# Patient Record
Sex: MALE | Race: WHITE | NOT HISPANIC OR LATINO | Employment: FULL TIME | URBAN - METROPOLITAN AREA
[De-identification: names, ages, dates, MRNs, and addresses within clinical notes are randomized per-mention and may not be internally consistent; named-entity substitution may affect disease eponyms.]

---

## 2017-09-18 ENCOUNTER — OFFICE VISIT (OUTPATIENT)
Dept: URGENT CARE | Facility: URGENT CARE | Age: 52
End: 2017-09-18
Payer: COMMERCIAL

## 2017-09-18 VITALS
HEART RATE: 70 BPM | RESPIRATION RATE: 14 BRPM | BODY MASS INDEX: 25.73 KG/M2 | WEIGHT: 190 LBS | HEIGHT: 72 IN | TEMPERATURE: 98.5 F

## 2017-09-18 DIAGNOSIS — S61.215A LACERATION OF LEFT RING FINGER WITHOUT DAMAGE TO NAIL, FOREIGN BODY PRESENCE UNSPECIFIED, INITIAL ENCOUNTER: Primary | ICD-10-CM

## 2017-09-18 PROCEDURE — 12001 RPR S/N/AX/GEN/TRNK 2.5CM/<: CPT | Performed by: FAMILY MEDICINE

## 2017-09-18 NOTE — MR AVS SNAPSHOT
After Visit Summary   9/18/2017    Manjinder Esparza    MRN: [id number]           Patient Information     Date Of Birth          1965        Visit Information        Provider Department      9/18/2017 6:50 PM Rashawn Diez MD [facility]        Today's Diagnoses     Laceration of left ring finger without damage to nail, foreign body presence unspecified, initial encounter    -  1       Follow-ups after your visit        Who to contact     If you have questions or need follow up information about today's clinic visit or your schedule please contact [facility] directly at [phone].  Normal or non-critical lab and imaging results will be communicated to you by MyChart, letter or phone within 4 business days after the clinic has received the results. If you do not hear from us within 7 days, please contact the clinic through [employer] or phone. If you have a critical or abnormal lab result, we will notify you by phone as soon as possible.  Submit refill requests through [employer] or call your pharmacy and they will forward the refill request to us. Please allow 3 business days for your refill to be completed.          Additional Information About Your Visit        MyChart Information     [employer] gives you secure access to your electronic health record. If you see a primary care provider, you can also send messages to your care team and make appointments. If you have questions, please call your primary care clinic.  If you do not have a primary care provider, please call [phone] and they will assist you.        Care EveryWhere ID     This is your Care EveryWhere ID. This could be used by other organizations to access your [place] medical records  [id number]        Your Vitals Were     Pulse Temperature Respirations Height BMI (Body Mass Index)       70 98.5  F (36.9  C) (Oral) 14 6' (1.829 m) 25.77 kg/m2        Blood Pressure from Last 3  Encounters:   11/17/16 120/80   08/05/16 (!) 146/100   07/16/16 (!) 142/91    Weight from Last 3 Encounters:   09/18/17 190 lb (86.2 kg)   11/17/16 192 lb (87.1 kg)   08/05/16 182 lb (82.6 kg)              We Performed the Following     REPAIR SUPERFICIAL, WOUND BODY < =2.5CM        Primary Care Provider Office Phone # Fax #    Honey SERRANO CHERYL Gaspar [phone]       [address] [facility] [address]        Equal Access to Services     [facility] : Hadii aad ku hadasho Soomaali, waaxda luqadaha, qaybta kaalmada adeegyada, tea beckford . So [facility] [phone].    ATENCIÓN: Si habla español, tiene a hastings disposición servicios gratuitos de asistencia lingüística. [facility] [phone].    We comply with applicable federal civil rights laws and [place] laws. We do not discriminate on the basis of race, color, national origin, age, disability sex, sexual orientation or gender identity.            Thank you!     Thank you for choosing [facility]  for your care. Our goal is always to provide you with excellent care. Hearing back from our patients is one way we can continue to improve our services. Please take a few minutes to complete the written survey that you may receive in the mail after your visit with us. Thank you!             Your Updated Medication List - Protect others around you: Learn how to safely use, store and throw away your medicines at www.disposemymeds.org.          This list is accurate as of: 9/18/17  7:28 PM.  Always use your most recent med list.                   Brand Name Dispense Instructions for use Diagnosis    hydrochlorothiazide 25 MG tablet    HYDRODIURIL    90 tablet    Take 1 tablet (25 mg) by mouth daily    Benign essential hypertension       neomycin-polymyxin-hydrocortisone 3.5-08669-6 otic suspension    CORTISPORIN    10 mL    Place 4 drops in ear(s) 4 times daily    Acute otitis externa of left ear, unspecified  type

## 2017-09-19 NOTE — NURSING NOTE
Chief Complaint   Patient presents with     Urgent Care     Laceration     cut to left 4th finger about 6:30 with razor blade. last tdap 2011       Initial Pulse 70  Temp 98.5  F (36.9  C) (Oral)  Resp 14  Ht 6' (1.829 m)  Wt 190 lb (86.2 kg)  BMI 25.77 kg/m2 Estimated body mass index is 25.77 kg/(m^2) as calculated from the following:    Height as of this encounter: 6' (1.829 m).    Weight as of this encounter: 190 lb (86.2 kg).  Medication Reconciliation: complete

## 2017-09-19 NOTE — PROGRESS NOTES
Subjective: Patient was working with a utility knife in his basement doing some repairs and cut the pulp of the palmar side of his distal digit fourth finger on the left. Tetanus is up-to-date.    Objective: There is about a 2 cm long incision in the middle of his fifth finger palmar side distal digit. After local Xylocaine without epinephrine I repaired it with 4-0 Ethilon.5 sutures.    Assessment and plan: Laceration repair left fourth finger, sutures out in 7-10 days.

## 2018-06-28 ENCOUNTER — HOSPITAL ENCOUNTER (OUTPATIENT)
Facility: CLINIC | Age: 53
Setting detail: OBSERVATION
Discharge: HOME | End: 2018-06-29
Attending: EMERGENCY MEDICINE | Admitting: INTERNAL MEDICINE
Payer: COMMERCIAL

## 2018-06-28 ENCOUNTER — OFFICE VISIT (OUTPATIENT)
Dept: FAMILY MEDICINE | Facility: CLINIC | Age: 53
End: 2018-06-28
Payer: COMMERCIAL

## 2018-06-28 ENCOUNTER — APPOINTMENT (OUTPATIENT)
Dept: CARDIOLOGY | Facility: CLINIC | Age: 53
End: 2018-06-28
Attending: EMERGENCY MEDICINE
Payer: COMMERCIAL

## 2018-06-28 ENCOUNTER — APPOINTMENT (OUTPATIENT)
Dept: GENERAL RADIOLOGY | Facility: CLINIC | Age: 53
End: 2018-06-28
Attending: EMERGENCY MEDICINE
Payer: COMMERCIAL

## 2018-06-28 VITALS
RESPIRATION RATE: 18 BRPM | DIASTOLIC BLOOD PRESSURE: 89 MMHG | SYSTOLIC BLOOD PRESSURE: 133 MMHG | WEIGHT: 185 LBS | BODY MASS INDEX: 25.9 KG/M2 | TEMPERATURE: 97.6 F | HEART RATE: 99 BPM | OXYGEN SATURATION: 99 % | HEIGHT: 71 IN

## 2018-06-28 DIAGNOSIS — R06.02 SOB (SHORTNESS OF BREATH): ICD-10-CM

## 2018-06-28 DIAGNOSIS — I48.91 ATRIAL FIBRILLATION, UNSPECIFIED TYPE (H): ICD-10-CM

## 2018-06-28 DIAGNOSIS — I48.91 ATRIAL FIBRILLATION, NEW ONSET (H): Primary | ICD-10-CM

## 2018-06-28 DIAGNOSIS — I48.91 NEW ONSET A-FIB (H): Primary | ICD-10-CM

## 2018-06-28 DIAGNOSIS — R07.89 CHEST PRESSURE: ICD-10-CM

## 2018-06-28 LAB
ALBUMIN SERPL-MCNC: 3.8 G/DL (ref 3.4–5)
ALP SERPL-CCNC: 49 U/L (ref 40–150)
ALT SERPL W P-5'-P-CCNC: 22 U/L (ref 0–70)
ANION GAP SERPL CALCULATED.3IONS-SCNC: 9 MMOL/L (ref 3–14)
AST SERPL W P-5'-P-CCNC: 16 U/L (ref 0–45)
BASOPHILS # BLD AUTO: 0 10E9/L (ref 0–0.2)
BASOPHILS NFR BLD AUTO: 0.3 %
BILIRUB SERPL-MCNC: 0.7 MG/DL (ref 0.2–1.3)
BUN SERPL-MCNC: 17 MG/DL (ref 7–30)
CA-I BLD-MCNC: 4.6 MG/DL (ref 4.4–5.2)
CALCIUM SERPL-MCNC: 8.4 MG/DL (ref 8.5–10.1)
CHLORIDE BLD-SCNC: 107 MMOL/L (ref 94–109)
CHLORIDE SERPL-SCNC: 109 MMOL/L (ref 94–109)
CO2 SERPL-SCNC: 25 MMOL/L (ref 20–32)
CREAT SERPL-MCNC: 1.22 MG/DL (ref 0.66–1.25)
DIFFERENTIAL METHOD BLD: NORMAL
EOSINOPHIL # BLD AUTO: 0.2 10E9/L (ref 0–0.7)
EOSINOPHIL NFR BLD AUTO: 3 %
ERYTHROCYTE [DISTWIDTH] IN BLOOD BY AUTOMATED COUNT: 12.6 % (ref 10–15)
ERYTHROCYTE [DISTWIDTH] IN BLOOD BY AUTOMATED COUNT: 12.7 % (ref 10–15)
GFR SERPL CREATININE-BSD FRML MDRD: 62 ML/MIN/1.7M2
GLUCOSE BLD-MCNC: 132 MG/DL (ref 70–99)
GLUCOSE SERPL-MCNC: 120 MG/DL (ref 70–99)
HCT VFR BLD AUTO: 45.4 % (ref 40–53)
HCT VFR BLD AUTO: 46.2 % (ref 40–53)
HGB BLD-MCNC: 15.9 G/DL (ref 13.3–17.7)
HGB BLD-MCNC: 15.9 G/DL (ref 13.3–17.7)
IMM GRANULOCYTES # BLD: 0 10E9/L (ref 0–0.4)
IMM GRANULOCYTES NFR BLD: 0.3 %
INTERPRETATION ECG - MUSE: NORMAL
LACTATE BLD-SCNC: 1.4 MMOL/L (ref 0.4–1.9)
LACTATE BLD-SCNC: 1.4 MMOL/L (ref 0.7–2)
LMWH PPP CHRO-ACNC: <0.1 IU/ML
LYMPHOCYTES # BLD AUTO: 2.5 10E9/L (ref 0.8–5.3)
LYMPHOCYTES NFR BLD AUTO: 35.4 %
MAGNESIUM SERPL-MCNC: 2.2 MG/DL (ref 1.6–2.3)
MCH RBC QN AUTO: 31.2 PG (ref 26.5–33)
MCH RBC QN AUTO: 31.3 PG (ref 26.5–33)
MCHC RBC AUTO-ENTMCNC: 34.4 G/DL (ref 31.5–36.5)
MCHC RBC AUTO-ENTMCNC: 35 G/DL (ref 31.5–36.5)
MCV RBC AUTO: 89 FL (ref 78–100)
MCV RBC AUTO: 91 FL (ref 78–100)
MONOCYTES # BLD AUTO: 1 10E9/L (ref 0–1.3)
MONOCYTES NFR BLD AUTO: 14.4 %
NEUTROPHILS # BLD AUTO: 3.3 10E9/L (ref 1.6–8.3)
NEUTROPHILS NFR BLD AUTO: 46.6 %
NRBC # BLD AUTO: 0 10*3/UL
NRBC BLD AUTO-RTO: 0 /100
NT-PROBNP SERPL-MCNC: 1313 PG/ML (ref 0–900)
PLATELET # BLD AUTO: 153 10E9/L (ref 150–450)
PLATELET # BLD AUTO: 159 10E9/L (ref 150–450)
POTASSIUM BLD-SCNC: 4.2 MMOL/L (ref 3.4–5.3)
POTASSIUM SERPL-SCNC: 4.1 MMOL/L (ref 3.4–5.3)
PROT SERPL-MCNC: 7.1 G/DL (ref 6.8–8.8)
RBC # BLD AUTO: 5.08 10E12/L (ref 4.4–5.9)
RBC # BLD AUTO: 5.09 10E12/L (ref 4.4–5.9)
SODIUM BLD-SCNC: 141 MMOL/L (ref 133–144)
SODIUM SERPL-SCNC: 143 MMOL/L (ref 133–144)
TROPONIN I SERPL-MCNC: <0.015 UG/L (ref 0–0.04)
TSH SERPL DL<=0.005 MIU/L-ACNC: 1.94 MU/L (ref 0.4–4)
WBC # BLD AUTO: 7.1 10E9/L (ref 4–11)
WBC # BLD AUTO: 8.3 10E9/L (ref 4–11)

## 2018-06-28 PROCEDURE — 99285 EMERGENCY DEPT VISIT HI MDM: CPT | Mod: 25 | Performed by: EMERGENCY MEDICINE

## 2018-06-28 PROCEDURE — 96365 THER/PROPH/DIAG IV INF INIT: CPT | Performed by: EMERGENCY MEDICINE

## 2018-06-28 PROCEDURE — 93010 ELECTROCARDIOGRAM REPORT: CPT | Mod: Z6 | Performed by: EMERGENCY MEDICINE

## 2018-06-28 PROCEDURE — 82435 ASSAY OF BLOOD CHLORIDE: CPT

## 2018-06-28 PROCEDURE — 25000132 ZZH RX MED GY IP 250 OP 250 PS 637: Performed by: NURSE PRACTITIONER

## 2018-06-28 PROCEDURE — 96366 THER/PROPH/DIAG IV INF ADDON: CPT | Performed by: EMERGENCY MEDICINE

## 2018-06-28 PROCEDURE — 85025 COMPLETE CBC W/AUTO DIFF WBC: CPT | Performed by: EMERGENCY MEDICINE

## 2018-06-28 PROCEDURE — 83880 ASSAY OF NATRIURETIC PEPTIDE: CPT | Performed by: EMERGENCY MEDICINE

## 2018-06-28 PROCEDURE — 99221 1ST HOSP IP/OBS SF/LOW 40: CPT | Mod: 25 | Performed by: INTERNAL MEDICINE

## 2018-06-28 PROCEDURE — 96376 TX/PRO/DX INJ SAME DRUG ADON: CPT | Performed by: EMERGENCY MEDICINE

## 2018-06-28 PROCEDURE — 25000125 ZZHC RX 250: Performed by: EMERGENCY MEDICINE

## 2018-06-28 PROCEDURE — 93306 TTE W/DOPPLER COMPLETE: CPT

## 2018-06-28 PROCEDURE — 83605 ASSAY OF LACTIC ACID: CPT | Performed by: INTERNAL MEDICINE

## 2018-06-28 PROCEDURE — 21400006 ZZH R&B CCU INTERMEDIATE UMMC

## 2018-06-28 PROCEDURE — 84132 ASSAY OF SERUM POTASSIUM: CPT

## 2018-06-28 PROCEDURE — 36415 COLL VENOUS BLD VENIPUNCTURE: CPT | Performed by: INTERNAL MEDICINE

## 2018-06-28 PROCEDURE — 93306 TTE W/DOPPLER COMPLETE: CPT | Mod: 26 | Performed by: INTERNAL MEDICINE

## 2018-06-28 PROCEDURE — 99291 CRITICAL CARE FIRST HOUR: CPT | Mod: 25 | Performed by: EMERGENCY MEDICINE

## 2018-06-28 PROCEDURE — 71046 X-RAY EXAM CHEST 2 VIEWS: CPT

## 2018-06-28 PROCEDURE — 25000131 ZZH RX MED GY IP 250 OP 636 PS 637: Performed by: STUDENT IN AN ORGANIZED HEALTH CARE EDUCATION/TRAINING PROGRAM

## 2018-06-28 PROCEDURE — 80053 COMPREHEN METABOLIC PANEL: CPT | Performed by: EMERGENCY MEDICINE

## 2018-06-28 PROCEDURE — 82330 ASSAY OF CALCIUM: CPT

## 2018-06-28 PROCEDURE — 84295 ASSAY OF SERUM SODIUM: CPT

## 2018-06-28 PROCEDURE — 82947 ASSAY GLUCOSE BLOOD QUANT: CPT

## 2018-06-28 PROCEDURE — 84484 ASSAY OF TROPONIN QUANT: CPT | Performed by: EMERGENCY MEDICINE

## 2018-06-28 PROCEDURE — 81001 URINALYSIS AUTO W/SCOPE: CPT | Performed by: EMERGENCY MEDICINE

## 2018-06-28 PROCEDURE — 83605 ASSAY OF LACTIC ACID: CPT

## 2018-06-28 PROCEDURE — 96368 THER/DIAG CONCURRENT INF: CPT | Performed by: EMERGENCY MEDICINE

## 2018-06-28 PROCEDURE — 84443 ASSAY THYROID STIM HORMONE: CPT | Performed by: EMERGENCY MEDICINE

## 2018-06-28 PROCEDURE — 93000 ELECTROCARDIOGRAM COMPLETE: CPT | Performed by: NURSE PRACTITIONER

## 2018-06-28 PROCEDURE — 83735 ASSAY OF MAGNESIUM: CPT | Performed by: EMERGENCY MEDICINE

## 2018-06-28 PROCEDURE — 85027 COMPLETE CBC AUTOMATED: CPT | Performed by: EMERGENCY MEDICINE

## 2018-06-28 PROCEDURE — 99214 OFFICE O/P EST MOD 30 MIN: CPT | Performed by: NURSE PRACTITIONER

## 2018-06-28 PROCEDURE — 93005 ELECTROCARDIOGRAM TRACING: CPT | Performed by: EMERGENCY MEDICINE

## 2018-06-28 PROCEDURE — 85520 HEPARIN ASSAY: CPT | Performed by: INTERNAL MEDICINE

## 2018-06-28 PROCEDURE — 25000128 H RX IP 250 OP 636: Performed by: EMERGENCY MEDICINE

## 2018-06-28 RX ORDER — DILTIAZEM HYDROCHLORIDE 5 MG/ML
25 INJECTION INTRAVENOUS ONCE
Status: COMPLETED | OUTPATIENT
Start: 2018-06-28 | End: 2018-06-28

## 2018-06-28 RX ORDER — NITROGLYCERIN 0.4 MG/1
0.4 TABLET SUBLINGUAL EVERY 5 MIN PRN
Status: DISCONTINUED | OUTPATIENT
Start: 2018-06-28 | End: 2018-06-29 | Stop reason: HOSPADM

## 2018-06-28 RX ORDER — ALUMINA, MAGNESIA, AND SIMETHICONE 2400; 2400; 240 MG/30ML; MG/30ML; MG/30ML
30 SUSPENSION ORAL EVERY 4 HOURS PRN
Status: DISCONTINUED | OUTPATIENT
Start: 2018-06-28 | End: 2018-06-29 | Stop reason: HOSPADM

## 2018-06-28 RX ORDER — METOPROLOL TARTRATE 1 MG/ML
2.5-5 INJECTION, SOLUTION INTRAVENOUS EVERY 5 MIN PRN
Status: DISCONTINUED | OUTPATIENT
Start: 2018-06-28 | End: 2018-06-29 | Stop reason: HOSPADM

## 2018-06-28 RX ORDER — LIDOCAINE 40 MG/G
CREAM TOPICAL
Status: DISCONTINUED | OUTPATIENT
Start: 2018-06-28 | End: 2018-06-29 | Stop reason: HOSPADM

## 2018-06-28 RX ORDER — ACETAMINOPHEN 325 MG/1
650 TABLET ORAL EVERY 4 HOURS PRN
Status: DISCONTINUED | OUTPATIENT
Start: 2018-06-28 | End: 2018-06-29 | Stop reason: HOSPADM

## 2018-06-28 RX ORDER — METOPROLOL TARTRATE 50 MG
50 TABLET ORAL 2 TIMES DAILY
Status: DISCONTINUED | OUTPATIENT
Start: 2018-06-28 | End: 2018-06-29 | Stop reason: HOSPADM

## 2018-06-28 RX ORDER — ONDANSETRON 4 MG/1
4 TABLET, ORALLY DISINTEGRATING ORAL EVERY 6 HOURS PRN
Status: DISCONTINUED | OUTPATIENT
Start: 2018-06-28 | End: 2018-06-29 | Stop reason: HOSPADM

## 2018-06-28 RX ORDER — HEPARIN SODIUM 10000 [USP'U]/100ML
0-3500 INJECTION, SOLUTION INTRAVENOUS CONTINUOUS
Status: DISCONTINUED | OUTPATIENT
Start: 2018-06-28 | End: 2018-06-29

## 2018-06-28 RX ORDER — HYDROCHLOROTHIAZIDE 25 MG/1
25 TABLET ORAL DAILY
Status: CANCELLED | OUTPATIENT
Start: 2018-06-28

## 2018-06-28 RX ORDER — LABETALOL HYDROCHLORIDE 5 MG/ML
10 INJECTION, SOLUTION INTRAVENOUS ONCE
Status: COMPLETED | OUTPATIENT
Start: 2018-06-28 | End: 2018-06-28

## 2018-06-28 RX ORDER — ACETAMINOPHEN 650 MG/1
650 SUPPOSITORY RECTAL EVERY 4 HOURS PRN
Status: DISCONTINUED | OUTPATIENT
Start: 2018-06-28 | End: 2018-06-29 | Stop reason: HOSPADM

## 2018-06-28 RX ORDER — ONDANSETRON 2 MG/ML
4 INJECTION INTRAMUSCULAR; INTRAVENOUS EVERY 6 HOURS PRN
Status: DISCONTINUED | OUTPATIENT
Start: 2018-06-28 | End: 2018-06-29 | Stop reason: HOSPADM

## 2018-06-28 RX ADMIN — METOPROLOL TARTRATE 50 MG: 50 TABLET ORAL at 20:23

## 2018-06-28 RX ADMIN — DILTIAZEM HYDROCHLORIDE 25 MG: 5 INJECTION INTRAVENOUS at 10:25

## 2018-06-28 RX ADMIN — HEPARIN SODIUM 1000 UNITS/HR: 10000 INJECTION, SOLUTION INTRAVENOUS at 12:59

## 2018-06-28 RX ADMIN — DILTIAZEM HYDROCHLORIDE 5 MG/HR: 5 INJECTION INTRAVENOUS at 12:56

## 2018-06-28 RX ADMIN — Medication 10 MG: at 19:00

## 2018-06-28 ASSESSMENT — ACTIVITIES OF DAILY LIVING (ADL)
TOILETING: 0-->INDEPENDENT
AMBULATION: 0-->INDEPENDENT
BATHING: 0-->INDEPENDENT
COGNITION: 0 - NO COGNITION ISSUES REPORTED
RETIRED_COMMUNICATION: 0-->UNDERSTANDS/COMMUNICATES WITHOUT DIFFICULTY
FALL_HISTORY_WITHIN_LAST_SIX_MONTHS: YES
NUMBER_OF_TIMES_PATIENT_HAS_FALLEN_WITHIN_LAST_SIX_MONTHS: 1
TRANSFERRING: 0-->INDEPENDENT
SWALLOWING: 0-->SWALLOWS FOODS/LIQUIDS WITHOUT DIFFICULTY
RETIRED_EATING: 0-->INDEPENDENT
DRESS: 0-->INDEPENDENT

## 2018-06-28 ASSESSMENT — ENCOUNTER SYMPTOMS
DIZZINESS: 1
PALPITATIONS: 1
UNEXPECTED WEIGHT CHANGE: 0

## 2018-06-28 NOTE — ED NOTES
[facility], [place]   ED Nurse to Floor Handoff     Manjinder Esparza is a 53 year old male who speaks English and lives with a spouse,  in a home  They arrived in the ED by car from home    ED Chief Complaint: Palpitations and Abnormal Ekg    ED Dx;   Final diagnoses:   Atrial fibrillation, unspecified type (H)         Needed?: No    Allergies:   Allergies   Allergen Reactions     Lisinopril      cough   .  Past Medical Hx: History reviewed. No pertinent past medical history.   Baseline Mental status: WDL  Current Mental Status changes: at basesline    Infection present or suspected this encounter: no  Sepsis suspected: No  Isolation type: No active isolations     Activity level - Baseline/Home:  Independent  Activity Level - Current:   Independent    Bariatric equipment needed?: No    In the ED these meds were given:   Medications   heparin  drip 25,000 units in 0.45% NaCl 250 mL (see additional administration details for dose) (1,000 Units/hr Intravenous New Bag 6/28/18 1259)   heparin bolus from infusion pump (not administered)   diltiazem (CARDIZEM) injection 25 mg (25 mg Intravenous Given 6/28/18 1025)   diltiazem (CARDIZEM) 125 mg in sodium chloride 0.9 % 125 mL infusion (5 mg/hr Intravenous New Bag 6/28/18 1256)       Drips running?  Yes    Home pump  No    Current LDAs  Peripheral IV 06/28/18 Left Lower forearm (Active)   Site Assessment WDL 6/28/2018 12:49 PM   Line Status Saline locked 6/28/2018 12:49 PM   Number of days:0       Labs results:   Labs Ordered and Resulted from Time of ED Arrival Up to the Time of Departure from the ED   COMPREHENSIVE METABOLIC PANEL - Abnormal; Notable for the following:        Result Value    Glucose 120 (*)     Calcium 8.4 (*)     All other components within normal limits   NT PROBNP INPATIENT - Abnormal; Notable for the following:     N-Terminal Pro BNP Inpatient 1313 (*)     All other components within normal limits   CBC WITH  PLATELETS DIFFERENTIAL   MAGNESIUM   TSH   TROPONIN I   CBC WITH PLATELETS   ROUTINE UA WITH MICROSCOPIC REFLEX TO CULTURE   ORTHOSTATIC BLOOD PRESSURE AND PULSE   NOTIFY PHYSICIAN   NOTIFY PHYSICIAN   PLATELETS MONITORED PER HEPARIN TREATMENT PROTOCOL (FOR MEANINGFUL USE       Imaging Studies:   Recent Results (from the past 24 hour(s))   XR Chest 2 Views    Narrative    Exam: XR CHEST 2 VW, 6/28/2018 10:20 AM    Indication: Shortness of breath;     Comparison: None available    Findings:   PA and lateral views of the chest. Cardiac silhouette is within normal  limits. Pulmonary vasculature is distinct. No focal airspace opacity,  pleural effusion, or pneumothorax. The upper abdomen is unremarkable.      Impression    Impression: No acute airspace disease.    I have personally reviewed the examination and initial interpretation  and I agree with the findings.    ROSLYN RASHEED MD       Recent vital signs:   /84  Pulse 113  Temp 97.7  F (36.5  C) (Axillary)  Resp 16  SpO2 96%    Cardiac Rhythm: A fib  Pt needs tele? Yes  Skin/wound Issues: None    Code Status: Full Code    Pain control: pt had none    Nausea control: pt had none    Abnormal labs/tests/findings requiring intervention: See EMR    Family present during ED course? Yes   Family Comments/Social Situation comments:     Tasks needing completion: None    James eBll, RN  asc-- [phone] [place] ED  [phone] [facility] ED

## 2018-06-28 NOTE — PROGRESS NOTES
"  SUBJECTIVE:   Manjindre Esparza is a 53 year old male who presents to clinic today for the following health issues:    Shortness of Breath       Duration: x1 day     Description (location/character/radiation): pt was working out yesterday and feels like he was going to faint, pt felt dizzy, arm was numb, shortness of breath, pt report he is not feeling right     Intensity:  mild    Accompanying signs and symptoms: None     History (similar episodes/previous evaluation): pt had a cold last week, pt     Therapies tried and outcome: None     New onset SOB with exercise yesterday  Chest pressure and tingling to left arm at the time.   \"feels off\"  Tachycardic - normally runs in the 60-70's.   HIGH stress at work right now.    Known HTN, but has not been taking his hydrochlorothiazide.    Get EKG.    No chest pain or pressure at this time.      Problem list and histories reviewed & adjusted, as indicated.  Additional history: as documented      Reviewed and updated as needed this visit by clinical staff  Tobacco  Allergies  Meds  Problems  Med Hx  Surg Hx  Fam Hx  Soc Hx        Reviewed and updated as needed this visit by Provider  Tobacco  Allergies  Meds  Problems  Med Hx  Surg Hx  Fam Hx  Soc Hx          ROS:  Constitutional, HEENT, cardiovascular, pulmonary, GI, , musculoskeletal, neuro, skin, endocrine and psych systems are negative, except as otherwise noted.    OBJECTIVE:     /89 (BP Location: Left arm, Patient Position: Sitting, Cuff Size: Adult Regular)  Pulse 99  Temp 97.6  F (36.4  C) (Oral)  Resp 18  Ht 5' 10.5\" (1.791 m)  Wt 185 lb (83.9 kg)  SpO2 99%  BMI 26.17 kg/m2  Body mass index is 26.17 kg/(m^2).  GENERAL: healthy, alert and no distress  EYES: Eyes grossly normal to inspection, PERRL and conjunctivae and sclerae normal  HENT: ear canals and TM's normal, nose and mouth without ulcers or lesions  NECK: no adenopathy, no asymmetry, masses, or scars and thyroid normal to " palpation  RESP: lungs clear to auscultation - no rales, rhonchi or wheezes  CV: irregularly irregular rhythm, peripheral pulses strong, no peripheral edema and color normal, nail beds pink and palms/soles pink  MS: no gross musculoskeletal defects noted, no edema  SKIN: no suspicious lesions or rashes    Diagnostic Test Results:  EKG - a fib noted.      ASSESSMENT/PLAN:       ICD-10-CM    1. Atrial fibrillation, new onset (H) I48.91    2. SOB (shortness of breath) R06.02 EKG 12-lead complete w/read - Clinics   3. Chest pressure R07.89 EKG 12-lead complete w/read - Clinics     Called and discussed with cardiology (Pierre) on call.  Recommend referral to ER if symptomatic.  To start beta blocker and anticoagulation if asymptomatic.    Discussed options with Pt - he prefers to go to ER for full workup.        JOHNNY Mercado [facility]

## 2018-06-28 NOTE — ED TRIAGE NOTES
"Pt comes from clinic with palpitations/feeling \"off\" since near syncopal episode yesterday after working out. Per pt, ekg was abnormal in clinic, and thus he was referred to ED for further work up. Alert and oriented.   "

## 2018-06-28 NOTE — ED PROVIDER NOTES
"  History     Chief Complaint   Patient presents with     Palpitations     Abnormal Ekg     HPI  Manjinder Esparza is a 53 year old male with a history of hypertension who presents for evaluation of palpitations. Patient reports yesterday morning while he was going through his regular exercise regimen where he does a lot of jumping and push-ups etc., he had the onset of dizziness, shortness of breath, and vision changes described as \"everything started going dark\". He felt as though he might faint, but denies syncope/loss of consciousness. Since yesterday morning he complains of persistent shortness of breath at rest and worse with exertion, as well as palpitations described as \"fast heart beat\", and intermittent hot flashes. Additionally, he reports he had an episode of left arm numbness last night, but this has since resolved. He denies chest pain, leg swelling, or recent weight gain. Further, patient reports over the past six months to one year he has had intermittent episodes of palpitations as well as labile blood pressures. He reports a distant history of hypertension and was on lisinopril several years ago, but after increasing his exercise regimen and losing weight his primary care provider did instruct the patient he could discontinue taking this medication.     I have reviewed the Medications, Allergies, Past Medical and Surgical History, and Social History in the [employer] system.  History reviewed. No pertinent past medical history.    Past Surgical History:   Procedure Laterality Date     SURGICAL HISTORY OF -       animal bone graft in the left lower jaw       Family History   Problem Relation Age of Onset     Diabetes Mother      type 2     Hypertension Mother      Cancer - colorectal Mother      Circulatory Mother      varicose veins     Eye Disorder Mother      cataracts     Hypertension Father      Prostate Cancer Father      Arthritis Father      in his knees     Lipids Father      Eye Disorder Maternal " Grandmother      cataracts     Cancer Maternal Grandmother      Cancer Maternal Grandfather      lung     Cerebrovascular Disease Paternal Grandmother      Allergies Other      self, developing allergies      GASTROINTESTINAL DISEASE Other      self, may have ibs     Lipids Brother      Prostate Cancer Brother        Social History   Substance Use Topics     Smoking status: Never Smoker     Smokeless tobacco: Never Used     Alcohol use Yes      Comment: 3 times a week       Current Facility-Administered Medications   Medication     heparin  drip 25,000 units in 0.45% NaCl 250 mL (see additional administration details for dose)     heparin bolus from infusion pump     Current Outpatient Prescriptions   Medication     hydrochlorothiazide (HYDRODIURIL) 25 MG tablet     neomycin-polymyxin-hydrocortisone (CORTISPORIN) 3.5-57211-5 otic suspension        Allergies   Allergen Reactions     Lisinopril      cough       Review of Systems   Constitutional: Negative for unexpected weight change.   Cardiovascular: Positive for palpitations. Negative for chest pain and leg swelling.   Neurological: Positive for dizziness. Negative for syncope.   All other systems reviewed and are negative.      Physical Exam   BP: (!) 136/108  Pulse: 113  Heart Rate: 120  Temp: 97.7  F (36.5  C)  Resp: 16  SpO2: 100 %  Lying Orthostatic BP: 145/111  Lying Orthostatic Pulse: 110 bpm  Sitting Orthostatic BP: 135/105  Sitting Orthostatic Pulse: 130 bpm  Standing Orthostatic BP: 104/98  Standing Orthostatic Pulse: 170 bpm      Physical Exam    ED Course     ED Course     Procedures       9:27 AM  The patient was seen and examined by Dr. Meraz in Room 21.          EKG Interpretation:      Interpreted by Caden Meraz  Time reviewed: 9:18 AM  Symptoms at time of EKG: None   Rhythm: atrial fibrillation - rapid  Rate: 110-120  Axis: Normal  Ectopy: none  Conduction: normal  ST Segments/ T Waves: No ST-T wave changes  Q Waves: v1 and v2  Comparison to  prior: Unchanged from earlier today    Clinical Impression: atrial fibrillation (new onset)    Results for orders placed or performed during the hospital encounter of 06/28/18   XR Chest 2 Views    Narrative    Exam: XR CHEST 2 VW, 6/28/2018 10:20 AM    Indication: Shortness of breath;     Comparison: None available    Findings:   PA and lateral views of the chest. Cardiac silhouette is within normal  limits. Pulmonary vasculature is distinct. No focal airspace opacity,  pleural effusion, or pneumothorax. The upper abdomen is unremarkable.      Impression    Impression: No acute airspace disease.    I have personally reviewed the examination and initial interpretation  and I agree with the findings.    ROSLYN RASHEED MD   CBC with platelets differential   Result Value Ref Range    WBC 7.1 4.0 - 11.0 10e9/L    RBC Count 5.09 4.4 - 5.9 10e12/L    Hemoglobin 15.9 13.3 - 17.7 g/dL    Hematocrit 46.2 40.0 - 53.0 %    MCV 91 78 - 100 fl    MCH 31.2 26.5 - 33.0 pg    MCHC 34.4 31.5 - 36.5 g/dL    RDW 12.7 10.0 - 15.0 %    Platelet Count 159 150 - 450 10e9/L    Diff Method Automated Method     % Neutrophils 46.6 %    % Lymphocytes 35.4 %    % Monocytes 14.4 %    % Eosinophils 3.0 %    % Basophils 0.3 %    % Immature Granulocytes 0.3 %    Nucleated RBCs 0 0 /100    Absolute Neutrophil 3.3 1.6 - 8.3 10e9/L    Absolute Lymphocytes 2.5 0.8 - 5.3 10e9/L    Absolute Monocytes 1.0 0.0 - 1.3 10e9/L    Absolute Eosinophils 0.2 0.0 - 0.7 10e9/L    Absolute Basophils 0.0 0.0 - 0.2 10e9/L    Abs Immature Granulocytes 0.0 0 - 0.4 10e9/L    Absolute Nucleated RBC 0.0    Comprehensive metabolic panel   Result Value Ref Range    Sodium 143 133 - 144 mmol/L    Potassium 4.1 3.4 - 5.3 mmol/L    Chloride 109 94 - 109 mmol/L    Carbon Dioxide 25 20 - 32 mmol/L    Anion Gap 9 3 - 14 mmol/L    Glucose 120 (H) 70 - 99 mg/dL    Urea Nitrogen 17 7 - 30 mg/dL    Creatinine 1.22 0.66 - 1.25 mg/dL    GFR Estimate 62 >60 mL/min/1.7m2    GFR Estimate If  Black 75 >60 mL/min/1.7m2    Calcium 8.4 (L) 8.5 - 10.1 mg/dL    Bilirubin Total 0.7 0.2 - 1.3 mg/dL    Albumin 3.8 3.4 - 5.0 g/dL    Protein Total 7.1 6.8 - 8.8 g/dL    Alkaline Phosphatase 49 40 - 150 U/L    ALT 22 0 - 70 U/L    AST 16 0 - 45 U/L   Magnesium   Result Value Ref Range    Magnesium 2.2 1.6 - 2.3 mg/dL   Nt probnp inpatient   Result Value Ref Range    N-Terminal Pro BNP Inpatient 1313 (H) 0 - 900 pg/mL   TSH   Result Value Ref Range    TSH 1.94 0.40 - 4.00 mU/L   CBC with platelets   Result Value Ref Range    WBC 8.3 4.0 - 11.0 10e9/L    RBC Count 5.08 4.4 - 5.9 10e12/L    Hemoglobin 15.9 13.3 - 17.7 g/dL    Hematocrit 45.4 40.0 - 53.0 %    MCV 89 78 - 100 fl    MCH 31.3 26.5 - 33.0 pg    MCHC 35.0 31.5 - 36.5 g/dL    RDW 12.6 10.0 - 15.0 %    Platelet Count 153 150 - 450 10e9/L   Echocardiogram Complete    Narrative    [id number]  ECH  [id number]  [id number]^EMERY^BRIGHT^E           [facility],[place]  Echocardiography Laboratory  [address] [place] [id number]     Name: ROCCO CASSIDY  MRN: [id number]  : 1965  Study Date: 2018 12:33 PM  Age: 53 yrs  Gender: Male  Patient Location: [facility]  Reason For Study: Afib  Ordering Physician: BRIGHT LAND  Performed By: Cristobal Jacobs RDCS     BSA: 0.34 m2  Height: 6 in  Weight: 185 lb  BP: 141/109 mmHg  _____________________________________________________________________________  __        Procedure  Complete Portable Echo Adult. Echocardiogram with two-dimensional, color and  spectral Doppler performed.  _____________________________________________________________________________  __        Interpretation Summary  Left ventricular function, chamber size, wall motion, and wall thickness are  normal.The EF is 55-60%.  Normal right ventricular size and systolic function.  Mild left atrial enlargement is present.  No significant valve  disease.  _____________________________________________________________________________  __        Left Ventricle  Left ventricular function, chamber size, wall motion, and wall thickness are  normal.The EF is 55-60%. Diastolic function not assessed due to atrial  fibrillation.     Right Ventricle  The right ventricle is normal size. Global right ventricular function is  normal.     Atria  The right atria appears normal. Mild left atrial enlargement is present.        Mitral Valve  The mitral valve is normal. Trace mitral insufficiency is present.     Aortic Valve  Aortic valve is normal in structure and function. The aortic valve is  tricuspid. No aortic regurgitation is present.     Tricuspid Valve  The tricuspid valve is normal. Trace to mild tricuspid insufficiency is  present. The right ventricular systolic pressure is approximated at 17.2 mmHg  plus the right atrial pressure. Pulmonary artery systolic pressure is normal.     Pulmonic Valve  The pulmonic valve is normal.     Vessels  The aorta root is normal. The inferior vena cava is normal. Estimated mean  right atrial pressure is 3 mmHg (normal).     Pericardium  No pericardial effusion is present.     _____________________________________________________________________________  __  MMode/2D Measurements & Calculations  RVDd: 3.6 cm  IVSd: 0.97 cm  LVIDd: 5.1 cm  LVIDs: 3.7 cm  LVPWd: 0.79 cm     FS: 28.3 %  LV mass(C)d: 159.8 grams  LV mass(C)dI: 469.9 grams/m2  asc Aorta Diam: 3.6 cm  LVOT diam: 2.2 cm  LVOT area: 4.0 cm2  LA Volume (BP): 81.7 ml  RWT: 0.31  TAPSE: 2.0 cm        Doppler Measurements & Calculations  TV max P.2 mmHg  TR max noe: 207.3 cm/sec  TR max P.2 mmHg        _____________________________________________________________________________  __        Report approved by: June Joseph 2018 01:16 PM        Medications   heparin  drip 25,000 units in 0.45% NaCl 250 mL (see additional administration details for dose)  (1,000 Units/hr Intravenous New Bag 6/28/18 1259)   heparin bolus from infusion pump (not administered)   diltiazem (CARDIZEM) injection 25 mg (25 mg Intravenous Given 6/28/18 1025)   diltiazem (CARDIZEM) 125 mg in sodium chloride 0.9 % 125 mL infusion (5 mg/hr Intravenous New Bag 6/28/18 1256)                 Critical Care time was 65 minutes for this patient excluding procedures including review of old records, bedside management, discussion with healthcare providers and documentation.              Labs Ordered and Resulted from Time of ED Arrival Up to the Time of Departure from the ED   COMPREHENSIVE METABOLIC PANEL - Abnormal; Notable for the following:        Result Value    Glucose 120 (*)     Calcium 8.4 (*)     All other components within normal limits   NT PROBNP INPATIENT - Abnormal; Notable for the following:     N-Terminal Pro BNP Inpatient 1313 (*)     All other components within normal limits   CBC WITH PLATELETS DIFFERENTIAL   MAGNESIUM   TSH   TROPONIN I   CBC WITH PLATELETS   ROUTINE UA WITH MICROSCOPIC REFLEX TO CULTURE   ORTHOSTATIC BLOOD PRESSURE AND PULSE   NOTIFY PHYSICIAN   NOTIFY PHYSICIAN   PLATELETS MONITORED PER HEPARIN TREATMENT PROTOCOL (FOR MEANINGFUL USE            Assessments & Plan (with Medical Decision Making)   53-year-old male presents for evaluation of dyspnea with exertion.  Differential includes pulmonary embolus, pneumonitis, arrhythmia, congestive heart failure, pneumothorax, URI, asthma.  Initial exam revealed elevated heart rate with irregularly irregular rhythm and elevated blood pressure.  EKG revealed atrial fibrillation with a rapid ventricular response.  Laboratories including CBC and comprehensive metabolic panel were normal with exception of a slight the elevated glucose.  Magnesium was normal and BNP was elevated at 1313.  TSH was normal.  Chest x-ray was normal.  Echocardiogram was grossly normal.  The case was discussed at length with cardiology.  The patient  had been treated with 25 mg of diltiazem with resultant decrease in atrial fibrillation.  Patient was started on a heparin as well as diltiazem drip.  The patient will be admitted to cardiology for further evaluation and management.  I have reviewed the nursing notes.    I have reviewed the findings, diagnosis, plan and need for follow up with the patient.    New Prescriptions    No medications on file       Final diagnoses:   Atrial fibrillation, unspecified type (H)   I, Lorene Weiner, am serving as a trained medical scribe to document services personally performed by Dhaval Meraz MD, based on the provider's statements to me.   IDhaval MD, was physically present and have reviewed and verified the accuracy of this note documented by Lorene Weiner.      6/28/2018   [facility], [place], EMERGENCY DEPARTMENT     Caden Meraz MD  06/28/18 [id number]

## 2018-06-28 NOTE — IP AVS SNAPSHOT
[address] [address]    Phone:  [phone]                                       After Visit Summary   6/28/2018    Manjinder Esparza    MRN: [id number]           After Visit Summary Signature Page     I have received my discharge instructions, and my questions have been answered. I have discussed any challenges I see with this plan with the nurse or doctor.    ..........................................................................................................................................  Patient/Patient Representative Signature      ..........................................................................................................................................  Patient Representative Print Name and Relationship to Patient    ..................................................               ................................................  Date                                            Time    ..........................................................................................................................................  Reviewed by Signature/Title    ...................................................              ..............................................  Date                                                            Time

## 2018-06-28 NOTE — IP AVS SNAPSHOT
MRN:[id number]                      After Visit Summary   6/28/2018    Manjinder Esparza    MRN: [id number]           Thank you!     Thank you for choosing [facility] for your care. Our goal is always to provide you with excellent care. Hearing back from our patients is one way we can continue to improve our services. Please take a few minutes to complete the written survey that you may receive in the mail after you visit with us. Thank you!        Patient Information     Date Of Birth          1965        Designated Caregiver       Most Recent Value    Caregiver    Will someone help with your care after discharge? yes    Name of designated caregiver Foster    Phone number of caregiver [phone]    Caregiver address [address]      About your hospital stay     You were admitted on:  June 28, 2018 You last received care in the:  [address] [facility]    You were discharged on:  June 29, 2018        Reason for your hospital stay       You were admitted to the hospital due to dizziness and heart palpitations.  On arrival to the ED you were found to be in an irregular heart rhythm called atrial fibrillation.  We started a medication called Metoprolol, a beta blocker, to help control your heart rate.  We performed a transesophageal echocardiogram (FRANCISCO JAVIER) and cardioversion to get you back into a normal heart rhythm (called normal sinus rhythm).  Because patients who have had episodes of atrial fibrillation are at risk of going back into this rhythm and at a higher risk of a stroke, we want you to start a blood thinner called Apixaban and take this medication for 4 weeks.  After that time we will have you follow up with our Electrophysiology team.                  Who to Call     For medical emergencies, please call [phone].  For non-urgent questions about your medical care, please call your primary care provider or clinic, [phone]  For questions related to your surgery, please call your  surgery clinic        Attending Provider     Provider Specialty    Caden Meraz MD Emergency Medicine    Delta Ortiz MD Cardiology       Primary Care Provider Office Phone # Fax #    Honey MAGGIE Gaspar [phone]       When to contact your care team       Call your primary doctor if you have any of the following:  increased shortness of breath, increased pain or increased episodes of heart palpitations.                  After Care Instructions     Activity       Your activity upon discharge: activity as tolerated, no driving today (related to sedation given during FRANCISCO JAVIER/Cardioversion)            Diet       Follow this diet upon discharge: Regular diet.  We do also recommend that you cut down your alcohol intake to help prevent recurrence of atrial fibrillation                  Follow-up Appointments     Adult [facility] Follow-up and recommended labs and tests       Follow up with the providers at the EP clinic, within 1 month  to evaluate treatment change and regarding new diagnosis. No follow up labs or test are needed    Follow up with PCP in 1-2 weeks for hospitalization follow up    Appointments on [facility] and/or [facility] (with [facility] or [facility] provider or service). Call [phone] if you haven't heard regarding these appointments within 7 days of discharge.                  Your next 10 appointments already scheduled     Jul 10, 2018  8:40 AM CDT   [facility]    [address] [place] [address] [id number] [phone]              Pending Results     Date and Time Order Name Status Description    6/29/2018 1446 Zio Patch Holter In process     6/29/2018 1214 EKG 12-lead, complete Preliminary     6/28/2018 1614 Cardioversion In process     6/28/2018 1614 Transesophageal Echocardiogram In process             Statement of Approval     Ordered          06/29/18 1455  I  have reviewed and agree with all the recommendations and orders detailed in this document.  EFFECTIVE NOW     Approved and electronically signed by:  Anne-Marie Rosario CNP             Admission Information     Date & Time Provider Department Dept. Phone    6/28/2018 Delta Ortiz MD [address] [facility] [phone]      Your Vitals Were     Blood Pressure Pulse Temperature Respirations Weight Pulse Oximetry    115/90 73 98.2  F (36.8  C) (Oral) 16 82.8 kg (182 lb 9.6 oz) 99%    BMI (Body Mass Index)                   25.83 kg/m2           MyChart Information     [facility] gives you secure access to your electronic health record. If you see a primary care provider, you can also send messages to your care team and make appointments. If you have questions, please call your primary care clinic.  If you do not have a primary care provider, please call [phone] and they will assist you.        Care EveryWhere ID     This is your Care EveryWhere ID. This could be used by other organizations to access your [facility] medical records  [id number]        Equal Access to Services     AMY ASTUDILLO : Hadii irasema huerta Soatul, waaxda luqadaha, qaybta kaalmamalou olmedo, tea beckford . So [facility] [place] [phone].    ATENCIÓN: Si habla español, tiene a hastings disposición servicios gratuitos de asistencia lingüística. Llame al [phone].    We comply with applicable federal civil rights laws and [place] laws. We do not discriminate on the basis of race, color, national origin, age, disability, sex, sexual orientation, or gender identity.               Review of your medicines      START taking        Dose / Directions    apixaban ANTICOAGULANT 5 MG tablet   Commonly known as:  ELIQUIS        Dose:  5 mg   Take 1 tablet (5 mg) by mouth 2 times daily   Quantity:  60 tablet   Refills:  0       metoprolol tartrate 50 MG tablet   Commonly known as:  LOPRESSOR        Dose:  50 mg   Take 1 tablet  (50 mg) by mouth 2 times daily   Quantity:  60 tablet   Refills:  1         CONTINUE these medicines which have NOT CHANGED        Dose / Directions    neomycin-polymyxin-hydrocortisone 3.5-08430-5 otic suspension   Commonly known as:  CORTISPORIN   Used for:  Acute otitis externa of left ear, unspecified type        Dose:  4 drop   Place 4 drops in ear(s) 4 times daily   Quantity:  10 mL   Refills:  0         STOP taking     hydrochlorothiazide 25 MG tablet   Commonly known as:  HYDRODIURIL                Where to get your medicines      These medications were sent to [facility] - [place], [place] - [address]  [address] [address]     Phone:  [phone]     apixaban ANTICOAGULANT 5 MG tablet    metoprolol tartrate 50 MG tablet                Protect others around you: Learn how to safely use, store and throw away your medicines at www.disposemymeds.org.             Medication List: This is a list of all your medications and when to take them. Check marks below indicate your daily home schedule. Keep this list as a reference.      Medications           Morning Afternoon Evening Bedtime As Needed    apixaban ANTICOAGULANT 5 MG tablet   Commonly known as:  ELIQUIS   Take 1 tablet (5 mg) by mouth 2 times daily   Last time this was given:  5 mg on 6/29/2018  3:27 PM                                      metoprolol tartrate 50 MG tablet   Commonly known as:  LOPRESSOR   Take 1 tablet (50 mg) by mouth 2 times daily   Last time this was given:  50 mg on 6/29/2018  8:30 AM                                      neomycin-polymyxin-hydrocortisone 3.5-08615-6 otic suspension   Commonly known as:  CORTISPORIN   Place 4 drops in ear(s) 4 times daily                                          More Information        Discharge Instructions for Atrial Fibrillation  You have been diagnosed with an abnormal heart rhythm called atrial fibrillation. With this condition, your heart s 2 upper  chambers quiver rather than squeeze the blood out in a normal pattern. This leads to an irregular and sometimes rapid heartbeat. Some people will develop associated symptoms such as a flip-flopping heartbeat, chest pain, lightheadedness, or shortness of breath. Other people may have no symptoms at all. Atrial fibrillation is serious because it affects the heart s ability to fill with blood as it should. Blood clots may form. This increases the risk for stroke. Untreated atrial fibrillation can also lead to heart failure. Atrial fibrillation can be controlled. With treatment, most people with atrial fibrillation lead normal lives.  Treatment options  Recommended treatment for atrial fibrillation depends on your age, symptoms, how long you have had atrial fibrillation, and other factors. You will have a complete evaluation to find out if you have any abnormalities that caused your heart to go into atrial fibrillation. This might be blocked heart arteries or a thyroid problem. Your doctor will assess your particular case and discuss choices with you.  Treatment choices may include:    Treating an underlying disorder that puts you at risk for atrial fibrillation. For example, correcting an abnormal thyroid or electrolyte problem, or treating a blocked heart artery.    Restoring a normal heart rhythm with an electrical shock (cardioversion) or with an antiarrhythmic medicine (chemical cardioversion)    Using medicine to control your heart rate in atrial fibrillation.    Preventing the risk for blood clot and stroke using blood-thinning medicines. Your doctor will tell you what he or she recommends. Choices may include aspirin, clopidogrel, warfarin, dabigatran, rivaroxaban, apixaban, and edoxaban.    Doing catheter ablation or a surgical maze procedure. These use different methods to destroy certain areas of heart tissue. This interrupts the electrical signals causing atrial fibrillation. One of these procedures may be a  choice when medicines do not work, or as an alternative to long-term medicine.    Other treatment choices may be recommended for you by your doctor.  Managing risk factors for stroke and preventing heart failure are important parts of any treatment plan for atrial fibrillation.  Home care    Take your medicines exactly as directed. Don t skip doses.    Work with your doctor to find the right medicines and doses for you.    Learn to take your own pulse. Keep a record of your results. Ask your doctor which pulse rates mean that you need medical attention. Slowing your pulse is often the goal of treatment. Ask your doctor if it s OK for you to use an automatic machine to check your pulse at home. Sometimes these machines don t count the pulse correctly when you have atrial fibrillation.    Limit your intake of coffee, tea, cola, and other beverages with caffeine. Talk with your doctor about whether you should eliminate caffeine.    Avoid over-the-counter medicines that have caffeine in them.    Let your doctor know what medicines you take, including prescription and over-the-counter medicines, as well as any supplements. They interfere with some medicines given for atrial fibrillation.    Ask your doctor about whether you can drink alcohol. Some people need to avoid alcohol to better treat atrial fibrillation. If you are taking blood-thinner medicines, alcohol may interfere with them by increasing their effect.    Never take stimulants such as amphetamines or cocaine. These drugs can speed up your heart rate and trigger atrial fibrillation.  Follow-up care  Follow up with your doctor, or as advised.     When should I call my healthcare provider  Call your healthcare provider right away if you have any of the following:    Weakness    Dizziness    Fainting    Fatigue    Shortness of breath    Chest pain with increased activity    A change in the usual regularity of your heartbeat, or an unusually fast heartbeat   Date  Last Reviewed: 4/23/2016 2000-2017 [employer]. [address] [place] [address], [place] [address]. All rights reserved. This information is not intended as a substitute for professional medical care. Always follow your healthcare professional's instructions.                Understanding Atrial Fibrillation    An arrhythmia is any problem with the speed or pattern of the heartbeat. Atrial fibrillation (AFib) is a common type of arrhythmia. It causes fast, chaotic electrical signals in the atria. This leads to poor functioning of the heart. It also affects how much blood your heart can pump out to the body.  Afib may occur once in a while and go away on its own. Or it may continue for longer periods and need treatment.  AFib can lead to serious problems, such as stroke. Your healthcare provider will need to monitor and manage it.  What happens during atrial fibrillation?   The heart has an electrical system that sends signals to control the heartbeat. As signals move through the heart, they tell the heart s upper chambers (atria) and lower chambers (ventricles) when to squeeze (contract) and relax. This lets blood move through the heart and out to the body and lungs.  With AFib, the atria receive abnormal signals. This causes them to contract in a fast and irregular way, and out of sync with the ventricles. When this happens, the atria also have a harder time moving blood into the ventricles. Blood may then pool in the atria, which increases the risk for blood clots and stroke. The ventricles also may contract too quickly and irregularly. As a result, they may not pump blood to the body and lungs as well as they should. This can weaken the heart muscle over time and cause heart failure.  What causes atrial fibrillation?  AFib is more common in older adults. It has many possible causes including:    Coronary artery disease    Heart valve disease    Heart attack    Heart surgery    High blood  pressure    Thyroid disease    Diabetes    Lung disease    Sleep apnea    Heavy alcohol use  In some cases of AFib, doctors do not know the cause.  What are the symptoms of atrial fibrillation?  AFib may or may not cause symptoms. If symptoms do occur, they may include:    A fast, pounding, irregular heartbeat    Shortness of breath    Tiredness    Dizziness or fainting    Chest pain  How is atrial fibrillation treated?  Treatments for AFib can include any of the options below.    Medicines. You may be prescribed:  ? Heart rate medicines to help slow down the heartbeat  ? Heart rhythm medicines to help the heart beat more regularly  ? Anti-clotting medicines to help reduce the risk for blood clots and stroke    Electrical cardioversion. Your healthcare provider uses special pads or paddles to send one or more brief electrical shocks to the heart. This can help reset the heartbeat to normal.    Ablation. Long, thin tubes called catheters are threaded through a blood vessel to the heart. There, the catheters send out hot or cold energy to the areas causing the abnormal signals. This energy destroys the problem tissue or cells. This improves the chances that your heart will stay in normal rhythm without using medicines. If your heart rate and rhythm can t be controlled, you may need ablation and a pacemaker. These will help control the heart rate and regularity of the heartbeat.    Surgery. During surgery, your healthcare provider may use different methods to create scar tissue in the areas of the heart causing the abnormal signals. The scar tissue disrupts the abnormal signals and may stop AFib from occurring.  What are the complications of atrial fibrillation?  These can include:    Blood clots    Stroke    Heart failure. This problem occurs when the heart muscle weakens so much that it can no longer pump blood well.  When should I call my healthcare provider?  Call your healthcare provider right away if you have any  of these:    Symptoms that don t get better with treatment, or get worse    New symptoms   Date Last Reviewed: 5/1/2016 2000-2017 [employer] [address] [facility] [address] [facility], [place] [address]. All rights reserved. This information is not intended as a substitute for professional medical care. Always follow your healthcare professional's instructions.

## 2018-06-28 NOTE — H&P
"History and Physical: Cardiology Service    Manjinder Esparza MRN# [id number]   YOB: 1965 Age: 53 year old       Admission Date: 6/28/2018    Chief Complaint: heart palpitations    HPI: Manjinder Esparza is a 53 year old male with a history of hypertension who presents for evaluation of palpitations. Patient reports yesterday morning while exercising, he had an onset of dizziness, shortness of breath, and vision changes described as \"everything started going dark.\"  He felt as though he might faint, but denies syncope/loss of consciousness. Since yesterday morning patient has had persistent shortness of breath with exertion, as well as palpitations. He reports a distant history of hypertension and was on lisinopril 8 years ago, but after increasing his exercise regimen and losing weight his primary care provider did instruct the patient he could discontinue taking this medication.     Patient also reports feeling palpitations after activity a couple times a week for the last 6 months to 1 year.  At time of interview patient is seen with significant other, who reports patient has mentioned feeling SOB, more tired than usual after minimal exertion for the last several weeks. He is usually a very active man, getting routine exercise without any issues.      Patient has never been a smoker, does report being a \"weekend drinker,\" drinking about 12-20 drinks a week. 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  In terms of family history, patient denies any cardiac history in mother/father/siblings.  He does report a couple weeks ago, his other brother (58y.o) was given \"some kind of new heart diagnosis.\"     At time of interview, he denies chest pain, leg swelling, or recent weight gain. Patient also denies any recent fevers, did have a \"cold\" last week.  No ill contacts.  [place] lot of [place] work [place].  No reported tick bites, rashes, or fatigue. "     History reviewed. No pertinent past medical history.    Past Surgical History:   Procedure Laterality Date     SURGICAL HISTORY OF -       animal bone graft in the left lower jaw         No current facility-administered medications on file prior to encounter.   Current Outpatient Prescriptions on File Prior to Encounter:  hydrochlorothiazide (HYDRODIURIL) 25 MG tablet Take 1 tablet (25 mg) by mouth daily (Patient not taking: Reported on 9/18/2017)   neomycin-polymyxin-hydrocortisone (CORTISPORIN) 3.5-58729-9 otic suspension Place 4 drops in ear(s) 4 times daily (Patient not taking: Reported on 6/28/2018)       Family History   Problem Relation Age of Onset     Diabetes Mother      type 2     Hypertension Mother      Cancer - colorectal Mother      Circulatory Mother      varicose veins     Eye Disorder Mother      cataracts     Hypertension Father      Prostate Cancer Father      Arthritis Father      in his knees     Lipids Father      Eye Disorder Maternal Grandmother      cataracts     Cancer Maternal Grandmother      Cancer Maternal Grandfather      lung     Cerebrovascular Disease Paternal Grandmother      Allergies Other      self, developing allergies      GASTROINTESTINAL DISEASE Other      self, may have ibs     Lipids Brother      Prostate Cancer Brother        Social History   Substance Use Topics     Smoking status: Never Smoker     Smokeless tobacco: Never Used     Alcohol use Yes      Comment: 3 times a week       Allergies   Allergen Reactions     Lisinopril      cough         ROS:   CONSTITUTIONAL:No report of fevers or chills  RESPIRATORY: No cough, wheezing, or hemoptysis, see HPI  CARDIOVASCULAR: see HPI  MUSCULO-SKELETAL: No joint pain/swelling, no muslce pain  NEURO: See HPI  ENDOCRINE: No temperature intolerance, no skin/hair changes  PSYCHIATRIC: No change in mood, feeling down/anxious, no change in sleep or appetite  GI: no melena or hematochezia, no change in bowel habits  : no  "hematuria or dysurea, no hesitancy, dribbling or incontinence  HEME: no easy bruising or bleeding, no history of anemia, no history of blood clots  SKIN: recent \"chigger\" exposure, reports itching and rash resolved      Physical Examination:  Vitals: BP (!) 135/102  Pulse 113  Temp 97.7  F (36.5  C) (Axillary)  Resp 16  SpO2 99%  BMI= There is no height or weight on file to calculate BMI.    GENERAL APPEARANCE: healthy, alert and no distress  HEENT: no icterus, no xanthelasmas, normal pupil size and reaction, normal palate, mucosa moist  NECK:  brisk carotid upstroke bilaterally  CHEST: lungs clear to auscultation without rales, rhonchi or wheezes, no use of accessory muscles, no retractions  CARDIOVASCULAR: irregularly irregular rhythm, tachycardic, normal S1 and S2, no S3 or S4 and no murmur, click or rub  ABDOMEN: soft, non tender, without hepatosplenomegaly, no masses palpable, bowel sounds normal  EXTREMITIES: warm, dry DP/PT pulses 2+ bilaterally, no clubbing, edema, or cyanosis   NEURO: alert and oriented to person/place/time, normal speech, gait and affect  SKIN: no ecchymoses, no rashes      Laboratory:  CMP  Recent Labs  Lab 06/28/18  0930      POTASSIUM 4.1   CHLORIDE 109   CO2 25   ANIONGAP 9   *   BUN 17   CR 1.22   GFRESTIMATED 62   GFRESTBLACK 75   JESUS 8.4*   MAG 2.2   PROTTOTAL 7.1   ALBUMIN 3.8   BILITOTAL 0.7   ALKPHOS 49   AST 16   ALT 22     CBC  Recent Labs  Lab 06/28/18  1303 06/28/18  0930   WBC 8.3 7.1   RBC 5.08 5.09   HGB 15.9 15.9   HCT 45.4 46.2   MCV 89 91   MCH 31.3 31.2   MCHC 35.0 34.4   RDW 12.6 12.7    159       Lab Results   Component Value Date    TROPI <0.015 06/28/2018         EKG 6/28/18: Atrial Fibrillation         TTE 6/28/18:  Interpretation Summary  Left ventricular function, chamber size, wall motion, and wall thickness are  normal.The EF is 55-60%.  Normal right ventricular size and systolic function.  Mild left atrial enlargement is " "present.  No significant valve disease.    Left Ventricle  Left ventricular function, chamber size, wall motion, and wall thickness are  normal.The EF is 55-60%. Diastolic function not assessed due to atrial  fibrillation.     Right Ventricle  The right ventricle is normal size. Global right ventricular function is  normal.     Atria  The right atria appears normal. Mild left atrial enlargement is present.     Mitral Valve  The mitral valve is normal. Trace mitral insufficiency is present.     Aortic Valve  Aortic valve is normal in structure and function. The aortic valve is  tricuspid. No aortic regurgitation is present.     Tricuspid Valve  The tricuspid valve is normal. Trace to mild tricuspid insufficiency is  present. The right ventricular systolic pressure is approximated at 17.2 mmHg  plus the right atrial pressure. Pulmonary artery systolic pressure is normal.     Pulmonic Valve  The pulmonic valve is normal.     Vessels  The aorta root is normal. The inferior vena cava is normal. Estimated mean  right atrial pressure is 3 mmHg (normal).     Pericardium  No pericardial effusion is present.      Assessment and plan: Manjinder Esparza is a 53 year old male with a history of hypertension who presents for evaluation of palpitations. Patient reports yesterday morning while exercising, he had an onset of dizziness, shortness of breath, vision changes, and near syncope. Since yesterday morning patient has had persistent shortness of breath at rest and worse with exertion, as well as palpitations described as \"fast heart beat\", and intermittent hot flashes.     1. New Atrial Fibrillation, with rvr: Patient reports over the past six months to one year he has had intermittent episodes of palpitations.  Currently feeling asymptomatic while in bed, some reported palpitations and lightheadedness with ambulation.  HR remains in 100-140's on Dilt gtt. Lab work unremarkable, trop negative x1. Echo EF 55-60%, otherwise " "normal.     - Continue Heparin gtt  - Anti-Xa checks per protocol  - Stop Dilt gtt  - Start Metoprolol 50 mg PO BID   - 2.5-5 mg IV Metoprolol prn for sustained HR>140.   -  NPO at midnight for possible FRANCISCO JAVIER/Cardioversion    2. Hypertension: Pt BP remains elevated 130-150/100's, he has been on antihypertensives in the past but was been taken off of these medications by his PCP about 8 years ago after diet/weight management. Patient states baseline BP around 130-140/80-90's.     - Start Metoprolol 50mg PO BID    FEN/Electrolytes: Cardiac diet/no caffeine. (NPO at midnight).       Prophylaxis:   GI: prn Maalox  DVT: Currently on hep gtt, ambulate as tolerated      Code Status: FULL (confirmed with patient)      Disposition: Anticipate return to prior living arrangement in < 2 days       Patient seen and discussed with Dr. Ortiz.     Keya Rosario, JOHNNY, CNP  [facility] Cardiology          I interviewed and examined the patient Manjinder Esparza, who  is a 53 year old male with a history of hypertension who presents for evaluation of palpitations. Patient reports yesterday morning while exercising, he had an onset of dizziness, shortness of breath, and vision changes described as \"everything started going dark.\"  He felt as though he might faint, but denies syncope/loss of consciousness. Since yesterday morning patient has had persistent shortness of breath with exertion, as well as palpitations. He reports a distant history of hypertension and was on lisinopril 8 years ago, but after increasing his exercise regimen and losing weight his primary care provider did instruct the patient he could discontinue taking this medication.     Patient also reports feeling palpitations after activity a couple times a week for the last 6 months to 1 year.  At time of interview patient is seen with significant other, who reports patient has mentioned feeling SOB, more tired than usual after minimal exertion for the last several weeks. He " "is usually a very active man, getting routine exercise without any issues.      Patient has never been a smoker, does report being a \"weekend drinker,\" drinking about 12-20 drinks a week. 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  In terms of family history, patient denies any cardiac history in mother/father/siblings.  He does report a couple weeks ago, his other brother (58y.o) was given \"some kind of new heart diagnosis.\"       My ROS was the following:  CONSTITUTIONAL:No report of fevers or chills  RESPIRATORY: No cough, wheezing, or hemoptysis, see HPI  CARDIOVASCULAR: see HPI  MUSCULO-SKELETAL: No joint pain/swelling, no muslce pain  NEURO: See HPI  ENDOCRINE: No temperature intolerance, no skin/hair changes  PSYCHIATRIC: No change in mood, feeling down/anxious, no change in sleep or appetite  GI: no melena or hematochezia, no change in bowel habits  : no hematuria or dysurea, no hesitancy, dribbling or incontinence  HEME: no easy bruising or bleeding, no history of anemia, no history of blood clots  SKIN: recent \"chigger\" exposure, reports itching and rash resolved      My Physical Examination was the following  Vitals: BP (!) 135/102  Pulse 113  Temp 97.7  F (36.5  C) (Axillary)  Resp 16  SpO2 99%  BMI= There is no height or weight on file to calculate BMI.    GENERAL APPEARANCE: healthy, alert and no distress  HEENT: no icterus, no xanthelasmas, normal pupil size and reaction, normal palate, mucosa moist  NECK:  brisk carotid upstroke bilaterally  CHEST: lungs clear to auscultation without rales, rhonchi or wheezes, no use of accessory muscles, no retractions  CARDIOVASCULAR: irregularly irregular rhythm, tachycardic, normal S1 and S2, no S3 or S4 and no murmur, click or rub  ABDOMEN: soft, non tender, without hepatosplenomegaly, no masses palpable, bowel sounds normal  EXTREMITIES: warm, dry DP/PT pulses 2+ bilaterally, no clubbing, edema, or cyanosis   NEURO: alert and " oriented to person/place/time, normal speech, gait and affect  SKIN: no ecchymoses, no rashes.    I reviewed labs, EKG and   echocardiogram: Left ventricular function, chamber size, wall motion, and wall thickness are  normal.The EF is 55-60%.  Normal right ventricular size and systolic function.  Mild left atrial enlargement is present.  No significant valve disease.    I discussed my assessment and plan with patient and JOHNNY Ribeiro, CNP        Delta Ortiz MD, PhD  Professor of Medicine  Division of Cardiology

## 2018-06-28 NOTE — MR AVS SNAPSHOT
After Visit Summary   6/28/2018    Manjinder Esparza    MRN: [id number]           Patient Information     Date Of Birth          1965        Visit Information        Provider Department      6/28/2018 7:40 AM Jessica Valenzuela APRN CNP [facility]        Today's Diagnoses     Atrial fibrillation, new onset (H)    -  1    SOB (shortness of breath)        Chest pressure           Follow-ups after your visit        Your next 10 appointments already scheduled     Jul 10, 2018  8:40 AM CDT   [facility] with Liat SILVER JOHNNY Staples CNP   [facility] ([facility])    [address] [address] [phone]              Who to contact     If you have questions or need follow up information about today's clinic visit or your schedule please contact [facility] directly at [phone].  Normal or non-critical lab and imaging results will be communicated to you by Vidyohart, letter or phone within 4 business days after the clinic has received the results. If you do not hear from us within 7 days, please contact the clinic through [employer] or phone. If you have a critical or abnormal lab result, we will notify you by phone as soon as possible.  Submit refill requests through [employer] or call your pharmacy and they will forward the refill request to us. Please allow 3 business days for your refill to be completed.          Additional Information About Your Visit        MyChart Information     [employer] gives you secure access to your electronic health record. If you see a primary care provider, you can also send messages to your care team and make appointments. If you have questions, please call your primary care clinic.  If you do not have a primary care provider, please call [phone] and they will assist you.        Care EveryWhere ID     This is your Care EveryWhere ID. This could be used by other  "organizations to access your [facility] medical records  [id number]        Your Vitals Were     Pulse Temperature Respirations Height Pulse Oximetry BMI (Body Mass Index)    99 97.6  F (36.4  C) (Oral) 18 5' 10.5\" (1.791 m) 99% 26.17 kg/m2       Blood Pressure from Last 3 Encounters:   06/28/18 133/89   11/17/16 120/80   08/05/16 (!) 146/100    Weight from Last 3 Encounters:   06/28/18 185 lb (83.9 kg)   09/18/17 190 lb (86.2 kg)   11/17/16 192 lb (87.1 kg)              We Performed the Following     EKG 12-lead complete w/read - Clinics        Primary Care Provider Office Phone # Fax #    Honey Gaspar [phone] [address] [facility] [address]        Equal Access to Services     [facility]: Hadii aad ku hadasho Soomaali, waaxda luqadaha, qaybta kaalmada adeegyada, waxay idiin haybeverleyn cinthia beckford . So [facility] [phone].    ATENCIÓN: Si habla español, tiene a hastings disposición servicios gratuitos de asistencia lingüística. Juan R al [phone].    We comply with applicable federal civil rights laws and [place] laws. We do not discriminate on the basis of race, color, national origin, age, disability, sex, sexual orientation, or gender identity.            Thank you!     Thank you for choosing [facility]  for your care. Our goal is always to provide you with excellent care. Hearing back from our patients is one way we can continue to improve our services. Please take a few minutes to complete the written survey that you may receive in the mail after your visit with us. Thank you!             Your Updated Medication List - Protect others around you: Learn how to safely use, store and throw away your medicines at www.disposemymeds.org.          This list is accurate as of 6/28/18  8:44 AM.  Always use your most recent med list.                   Brand Name Dispense Instructions for use Diagnosis    hydrochlorothiazide 25 MG tablet    HYDRODIURIL    90 " tablet    Take 1 tablet (25 mg) by mouth daily    Benign essential hypertension       neomycin-polymyxin-hydrocortisone 3.5-10894-9 otic suspension    CORTISPORIN    10 mL    Place 4 drops in ear(s) 4 times daily    Acute otitis externa of left ear, unspecified type

## 2018-06-29 ENCOUNTER — APPOINTMENT (OUTPATIENT)
Dept: CARDIOLOGY | Facility: CLINIC | Age: 53
End: 2018-06-29
Attending: NURSE PRACTITIONER
Payer: COMMERCIAL

## 2018-06-29 ENCOUNTER — SURGERY (OUTPATIENT)
Age: 53
End: 2018-06-29

## 2018-06-29 ENCOUNTER — ANESTHESIA (OUTPATIENT)
Dept: SURGERY | Facility: CLINIC | Age: 53
End: 2018-06-29
Payer: COMMERCIAL

## 2018-06-29 ENCOUNTER — ANESTHESIA EVENT (OUTPATIENT)
Dept: SURGERY | Facility: CLINIC | Age: 53
End: 2018-06-29
Payer: COMMERCIAL

## 2018-06-29 VITALS
SYSTOLIC BLOOD PRESSURE: 115 MMHG | OXYGEN SATURATION: 99 % | BODY MASS INDEX: 25.83 KG/M2 | DIASTOLIC BLOOD PRESSURE: 90 MMHG | RESPIRATION RATE: 16 BRPM | HEART RATE: 73 BPM | WEIGHT: 182.6 LBS | TEMPERATURE: 98.2 F

## 2018-06-29 LAB
ALBUMIN UR-MCNC: NEGATIVE MG/DL
APPEARANCE UR: CLEAR
BILIRUB UR QL STRIP: NEGATIVE
COLOR UR AUTO: YELLOW
CREAT SERPL-MCNC: 1.17 MG/DL (ref 0.66–1.25)
GFR SERPL CREATININE-BSD FRML MDRD: 65 ML/MIN/1.7M2
GLUCOSE UR STRIP-MCNC: NEGATIVE MG/DL
HGB UR QL STRIP: NEGATIVE
INR PPP: 1.16 (ref 0.86–1.14)
INTERPRETATION ECG - MUSE: NORMAL
KETONES UR STRIP-MCNC: NEGATIVE MG/DL
LEUKOCYTE ESTERASE UR QL STRIP: NEGATIVE
LMWH PPP CHRO-ACNC: 0.24 IU/ML
LMWH PPP CHRO-ACNC: 0.6 IU/ML
MAGNESIUM SERPL-MCNC: 2.5 MG/DL (ref 1.6–2.3)
NITRATE UR QL: NEGATIVE
PH UR STRIP: 6 PH (ref 5–7)
POTASSIUM SERPL-SCNC: 4.2 MMOL/L (ref 3.4–5.3)
RBC #/AREA URNS AUTO: 1 /HPF (ref 0–2)
SOURCE: NORMAL
SP GR UR STRIP: 1.01 (ref 1–1.03)
UROBILINOGEN UR STRIP-MCNC: NORMAL MG/DL (ref 0–2)
WBC #/AREA URNS AUTO: <1 /HPF (ref 0–5)

## 2018-06-29 PROCEDURE — 0298T ZZC EXT ECG > 48HR TO 21 DAY REVIEW AND INTERPRETATN: CPT | Performed by: INTERNAL MEDICINE

## 2018-06-29 PROCEDURE — 93010 ELECTROCARDIOGRAM REPORT: CPT | Performed by: INTERNAL MEDICINE

## 2018-06-29 PROCEDURE — 93312 ECHO TRANSESOPHAGEAL: CPT | Mod: 26 | Performed by: INTERNAL MEDICINE

## 2018-06-29 PROCEDURE — 85610 PROTHROMBIN TIME: CPT | Performed by: INTERNAL MEDICINE

## 2018-06-29 PROCEDURE — 25000132 ZZH RX MED GY IP 250 OP 250 PS 637: Performed by: NURSE PRACTITIONER

## 2018-06-29 PROCEDURE — G0378 HOSPITAL OBSERVATION PER HR: HCPCS

## 2018-06-29 PROCEDURE — 27210995 ZZH RX 272: Performed by: INTERNAL MEDICINE

## 2018-06-29 PROCEDURE — 93325 DOPPLER ECHO COLOR FLOW MAPG: CPT | Mod: 26 | Performed by: INTERNAL MEDICINE

## 2018-06-29 PROCEDURE — 25000125 ZZHC RX 250: Performed by: INTERNAL MEDICINE

## 2018-06-29 PROCEDURE — 25000125 ZZHC RX 250: Performed by: NURSE ANESTHETIST, CERTIFIED REGISTERED

## 2018-06-29 PROCEDURE — 93320 DOPPLER ECHO COMPLETE: CPT | Mod: 26 | Performed by: INTERNAL MEDICINE

## 2018-06-29 PROCEDURE — 0296T ZIO PATCH HOLTER: CPT | Performed by: NURSE PRACTITIONER

## 2018-06-29 PROCEDURE — 82565 ASSAY OF CREATININE: CPT | Performed by: INTERNAL MEDICINE

## 2018-06-29 PROCEDURE — 85520 HEPARIN ASSAY: CPT | Performed by: INTERNAL MEDICINE

## 2018-06-29 PROCEDURE — 25000128 H RX IP 250 OP 636: Performed by: EMERGENCY MEDICINE

## 2018-06-29 PROCEDURE — 84132 ASSAY OF SERUM POTASSIUM: CPT | Performed by: INTERNAL MEDICINE

## 2018-06-29 PROCEDURE — 93312 ECHO TRANSESOPHAGEAL: CPT

## 2018-06-29 PROCEDURE — 93005 ELECTROCARDIOGRAM TRACING: CPT

## 2018-06-29 PROCEDURE — 25000128 H RX IP 250 OP 636: Performed by: INTERNAL MEDICINE

## 2018-06-29 PROCEDURE — 99152 MOD SED SAME PHYS/QHP 5/>YRS: CPT

## 2018-06-29 PROCEDURE — 37000008 ZZH ANESTHESIA TECHNICAL FEE, 1ST 30 MIN

## 2018-06-29 PROCEDURE — 83735 ASSAY OF MAGNESIUM: CPT | Performed by: INTERNAL MEDICINE

## 2018-06-29 PROCEDURE — 99217 ZZC OBSERVATION CARE DISCHARGE: CPT | Mod: 25 | Performed by: INTERNAL MEDICINE

## 2018-06-29 PROCEDURE — 92960 CARDIOVERSION ELECTRIC EXT: CPT | Performed by: NURSE PRACTITIONER

## 2018-06-29 PROCEDURE — 36415 COLL VENOUS BLD VENIPUNCTURE: CPT | Performed by: INTERNAL MEDICINE

## 2018-06-29 PROCEDURE — 99153 MOD SED SAME PHYS/QHP EA: CPT

## 2018-06-29 PROCEDURE — 92960 CARDIOVERSION ELECTRIC EXT: CPT

## 2018-06-29 RX ORDER — LIDOCAINE 40 MG/G
CREAM TOPICAL
Status: DISCONTINUED | OUTPATIENT
Start: 2018-06-29 | End: 2018-06-29 | Stop reason: HOSPADM

## 2018-06-29 RX ORDER — METOPROLOL TARTRATE 50 MG
50 TABLET ORAL 2 TIMES DAILY
Qty: 60 TABLET | Refills: 1 | Status: SHIPPED | OUTPATIENT
Start: 2018-06-29 | End: 2018-06-29

## 2018-06-29 RX ORDER — METOPROLOL TARTRATE 50 MG
50 TABLET ORAL 2 TIMES DAILY
Qty: 60 TABLET | Refills: 1 | Status: SHIPPED | OUTPATIENT
Start: 2018-06-29 | End: 2018-07-12

## 2018-06-29 RX ORDER — FENTANYL CITRATE 50 UG/ML
25-50 INJECTION, SOLUTION INTRAMUSCULAR; INTRAVENOUS
Status: DISCONTINUED | OUTPATIENT
Start: 2018-06-29 | End: 2018-06-29 | Stop reason: HOSPADM

## 2018-06-29 RX ORDER — NALOXONE HYDROCHLORIDE 0.4 MG/ML
.1-.4 INJECTION, SOLUTION INTRAMUSCULAR; INTRAVENOUS; SUBCUTANEOUS
Status: DISCONTINUED | OUTPATIENT
Start: 2018-06-29 | End: 2018-06-29 | Stop reason: HOSPADM

## 2018-06-29 RX ORDER — FENTANYL CITRATE 50 UG/ML
50-100 INJECTION, SOLUTION INTRAMUSCULAR; INTRAVENOUS
Status: COMPLETED | OUTPATIENT
Start: 2018-06-29 | End: 2018-06-29

## 2018-06-29 RX ORDER — SODIUM CHLORIDE 9 MG/ML
INJECTION, SOLUTION INTRAVENOUS CONTINUOUS PRN
Status: DISCONTINUED | OUTPATIENT
Start: 2018-06-29 | End: 2018-06-29 | Stop reason: HOSPADM

## 2018-06-29 RX ORDER — POTASSIUM CHLORIDE 750 MG/1
40 TABLET, EXTENDED RELEASE ORAL
Status: DISCONTINUED | OUTPATIENT
Start: 2018-06-29 | End: 2018-06-29 | Stop reason: HOSPADM

## 2018-06-29 RX ORDER — ACYCLOVIR 200 MG/1
3 CAPSULE ORAL ONCE
Status: COMPLETED | OUTPATIENT
Start: 2018-06-29 | End: 2018-06-29

## 2018-06-29 RX ORDER — POTASSIUM CHLORIDE 750 MG/1
20 TABLET, EXTENDED RELEASE ORAL
Status: DISCONTINUED | OUTPATIENT
Start: 2018-06-29 | End: 2018-06-29 | Stop reason: HOSPADM

## 2018-06-29 RX ORDER — FLUMAZENIL 0.1 MG/ML
0.2 INJECTION, SOLUTION INTRAVENOUS
Status: DISCONTINUED | OUTPATIENT
Start: 2018-06-29 | End: 2018-06-29 | Stop reason: HOSPADM

## 2018-06-29 RX ADMIN — SODIUM CHLORIDE 100 ML: 9 INJECTION, SOLUTION INTRAVENOUS at 12:24

## 2018-06-29 RX ADMIN — METHOHEXITAL SODIUM 50 MG: 500 INJECTION, POWDER, LYOPHILIZED, FOR SOLUTION INTRAMUSCULAR; INTRAVENOUS; RECTAL at 12:09

## 2018-06-29 RX ADMIN — SODIUM CHLORIDE 3 ML: 9 INJECTION, SOLUTION INTRAMUSCULAR; INTRAVENOUS; SUBCUTANEOUS at 11:51

## 2018-06-29 RX ADMIN — TOPICAL ANESTHETIC 0.5 ML: 200 SPRAY DENTAL; PERIODONTAL at 11:07

## 2018-06-29 RX ADMIN — APIXABAN 5 MG: 5 TABLET, FILM COATED ORAL at 15:27

## 2018-06-29 RX ADMIN — FENTANYL CITRATE 100 MCG: 50 INJECTION INTRAMUSCULAR; INTRAVENOUS at 11:36

## 2018-06-29 RX ADMIN — HEPARIN SODIUM 1100 UNITS/HR: 10000 INJECTION, SOLUTION INTRAVENOUS at 08:00

## 2018-06-29 RX ADMIN — METOPROLOL TARTRATE 50 MG: 50 TABLET ORAL at 08:30

## 2018-06-29 RX ADMIN — LIDOCAINE HYDROCHLORIDE 30 ML: 20 SOLUTION ORAL; TOPICAL at 11:06

## 2018-06-29 RX ADMIN — MIDAZOLAM 4 MG: 1 INJECTION INTRAMUSCULAR; INTRAVENOUS at 11:36

## 2018-06-29 NOTE — PROGRESS NOTES
Pt arrived in ECHO department for scheduled FRANCISCO JAVIER/DCCV.   Procedure explained, questions answered and consent signed. Discharge instructions discussed with patient.  Pt's throat sprayed at 11:00, therefore pt will not be able to eat or drink until 2 hours after at 13:00. Informed pt of this time and encouraged to start with warm fluids and soft foods.    Pt tolerated procedure well, and was given a total of 100 mcg IV fentanyl and 4 mg IV versed for conscious sedation. Pt denied throat or chest pain after FRANCISCO JAVIER complete.   FRANCISCO JAVIER probe 56 used for procedure.  No clots visualized on FRANCISCO JAVIER so proceeded with DCCV. Anesthesia gave pt 50 mg IV brevitol for sedation and pt was DCCV at 150 Joules to a SR. VSS throughout both procedures.   Pt denied chest or throat pain after procedure and was monitored until awake. Escorted back to  via stretcher and hospital transport.   Report given to EZEQUIEL Rainey.

## 2018-06-29 NOTE — ANESTHESIA PREPROCEDURE EVALUATION
ANESTHESIA PREOP EVALUATION    NPO Status: more then 6 hours    Procedure: Cardioversion    HPI: Atrial Fibrillation    PMHx/PSHx/ROS:  PAST MEDICAL HISTORY: History reviewed. No pertinent past medical history.    PAST SURGICAL HISTORY:   Past Surgical History:   Procedure Laterality Date     SURGICAL HISTORY OF -       animal bone graft in the left lower jaw       FAMILY HISTORY:   Family History   Problem Relation Age of Onset     Diabetes Mother      type 2     Hypertension Mother      Cancer - colorectal Mother      Circulatory Mother      varicose veins     Eye Disorder Mother      cataracts     Hypertension Father      Prostate Cancer Father      Arthritis Father      in his knees     Lipids Father      Eye Disorder Maternal Grandmother      cataracts     Cancer Maternal Grandmother      Cancer Maternal Grandfather      lung     Cerebrovascular Disease Paternal Grandmother      Allergies Other      self, developing allergies      GASTROINTESTINAL DISEASE Other      self, may have ibs     Lipids Brother      Prostate Cancer Brother          Past Anes Hx: No personal or family h/o anesthesia problems      Allergies:   Allergies   Allergen Reactions     Lisinopril      cough       Meds:   Prescriptions Prior to Admission   Medication Sig Dispense Refill Last Dose     hydrochlorothiazide (HYDRODIURIL) 25 MG tablet Take 1 tablet (25 mg) by mouth daily (Patient not taking: Reported on 9/18/2017) 90 tablet 3 More than a month at Unknown time     neomycin-polymyxin-hydrocortisone (CORTISPORIN) 3.5-77171-5 otic suspension Place 4 drops in ear(s) 4 times daily (Patient not taking: Reported on 6/28/2018) 10 mL 0 More than a month at Unknown time       No current outpatient prescriptions on file.       Physical Exam:  VS: T 98.2, P 71, /92, R 16, SpO2 96%   MP1, TM distance >3FB, mouth opening adequate, full ROM, intact dentition.        BMP:  Lab Results   Component Value Date     06/28/2018      Lab  Results   Component Value Date    POTASSIUM 4.2 06/29/2018     Lab Results   Component Value Date    CHLORIDE 107 06/28/2018     Lab Results   Component Value Date    JESUS 8.4 06/28/2018     Lab Results   Component Value Date    CO2 25 06/28/2018     Lab Results   Component Value Date    BUN 17 06/28/2018     Lab Results   Component Value Date    CR 1.17 06/29/2018     Lab Results   Component Value Date     06/28/2018        CBC:  Lab Results   Component Value Date    WBC 8.3 06/28/2018     Lab Results   Component Value Date    HGB 15.9 06/28/2018     Lab Results   Component Value Date    HCT 45.4 06/28/2018     Lab Results   Component Value Date     06/28/2018        Coags/Type and Screen  Lab Results   Component Value Date    INR 1.16 06/29/2018     No results found for: PT  Type and Screen:                       Anesthesia Plan      History & Physical Review  History and physical reviewed and following examination; no interval change.    ASA Status:  1 .    NPO Status:  > 8 hours    Plan for General with Intravenous induction.   PONV prophylaxis:  Ondansetron (or other 5HT-3)       Postoperative Care  Postoperative pain management:  IV analgesics.      Consents  Anesthetic plan, risks, benefits and alternatives discussed with:  Patient..      Jessica Gonzalez MD    6/29/2018

## 2018-06-29 NOTE — ANESTHESIA POSTPROCEDURE EVALUATION
Patient: Manjinder Esparza    Procedure(s):  Cardioversion    Diagnosis:Atrial Fibrillation  Diagnosis Additional Information: No value filed.    Anesthesia Type:  General    Note:  Anesthesia Post Evaluation    Patient location during evaluation: Bedside  Patient participation: Able to fully participate in evaluation  Level of consciousness: awake and sleepy but conscious  Pain management: adequate  Airway patency: patent  Cardiovascular status: acceptable  Respiratory status: acceptable  Hydration status: acceptable  PONV: none     Anesthetic complications: None          Last vitals:  Vitals:    06/29/18 1216 06/29/18 1222 06/29/18 1230   BP: 105/82 112/85 (!) 114/92   Pulse: 71 76 71   Resp: 16 16 16   Temp:      SpO2: 96% 96% 96%         Electronically Signed By: Jessica Gonzalez MD  June 29, 2018  12:38 PM

## 2018-06-29 NOTE — ANESTHESIA CARE TRANSFER NOTE
Patient: Manjinder Esparza    Procedure(s):  Cardioversion    Diagnosis: Atrial Fibrillation  Diagnosis Additional Information: No value filed.    Anesthesia Type:   No value filed.     Note:  Airway :Nasal Cannula  Patient transferred to:Telemetry/Step Down Unit  Comments: Anesthesia Care Transfer Note      Transfer to:  PACU    Patient Vital Signs:  Stable    Airway:  None    Patient alert and breathing comfortably.  VSS.  Care transferred with report to Echo RN.    Khris Urban CRNA      Vitals: (Last set prior to Anesthesia Care Transfer)    CRNA VITALS  6/29/2018 1141 - 6/29/2018 1211      6/29/2018             NIBP: 106/88    Pulse: 75    Resp Rate (observed): 10    EKG: Sinus rhythm                Electronically Signed By: JOHNNY Geller CRNA  June 29, 2018  12:11 PM

## 2018-06-29 NOTE — PROGRESS NOTES
Admission          6/28/2018  9:17 AM  -----------------------------------------------------------  Diagnosis: new onset of atrial fibrillation    Admitted from: ED  Report given from: Oscar  Via: stretcher  Accompanied by: family  Belongings: Placed in closet; valuables sent home with family  Admission Profile: complete  Teaching: orientation to unit, call don't fall, use of console, meal times, visiting hours,  when to call for the RN (angina/sob/dizzyness, etc.), and enforced importance of safety   Access:2  PIV bilateral   Telemetry:Placed on pt  Ht./Wt.: complete    Temp:  [97.6  F (36.4  C)-98.5  F (36.9  C)] 98.3  F (36.8  C)  Pulse:  [] 95  Heart Rate:  [] 88  Resp:  [16-22] 18  BP: (118-151)/() 138/95  SpO2:  [94 %-100 %] 97 %

## 2018-06-29 NOTE — PLAN OF CARE
"Problem: Patient Care Overview  Goal: Plan of Care/Patient Progress Review  Outcome: No Change    D: New onset atrial fibrillation. Hx HTN. States stopped taking medication for this \"years ago.\" Arrived in clinic this AM with palpitations, dizziness, SOB.     I/A: A0x4. VSSA, on room air. Adequate UO. Afib 80s-90s. Denies pain. No edema. NPO since midnight in anticipation of FRANCISCO JAVIER and cardioversion. Denies SOB at rest, can get SOB with activity. Up independently. Right PIV, saline locked. Hep 10a 0.6 @ 0345. Heparin gtt previously running at 1300 units/hr, stopped for 30 min and reduced by 200 units/hr per order. Left PIV, Heparin gtt @ 1100 units/hr. Recheck Hep 10a due @ 1100.     P: Continue to monitor Pt. FRANCISCO JAVIER and cardioversion today. Notify Cards 1 with pertinent changes.      "

## 2018-06-29 NOTE — DISCHARGE SUMMARY
[address] [address]  p: [phone]    Discharge Summary: Cardiology Service    Manjinder Esparza MRN# [id number]   YOB: 1965 Age: 53 year old       Admission Date: 2018  Discharge Date: 18      Discharge Diagnoses:  1. New Atrial Fibrillation with rvr  2. Hypertension    Pertinent Procedures:  1. FRANCISCO JAVIER/Cardioversion    Consults:  None    Imaging with results:  EC18: Afib HR 92     Echo:  TTE 18:  Interpretation Summary  Left ventricular function, chamber size, wall motion, and wall thickness are  normal.The EF is 55-60%.  Normal right ventricular size and systolic function.  Mild left atrial enlargement is present.  No significant valve disease.    Left Ventricle  Left ventricular function, chamber size, wall motion, and wall thickness are  normal.The EF is 55-60%. Diastolic function not assessed due to atrial  fibrillation.     Right Ventricle  The right ventricle is normal size. Global right ventricular function is  normal.     Atria  The right atria appears normal. Mild left atrial enlargement is present.     Mitral Valve  The mitral valve is normal. Trace mitral insufficiency is present.     Aortic Valve  Aortic valve is normal in structure and function. The aortic valve is  tricuspid. No aortic regurgitation is present.     Tricuspid Valve  The tricuspid valve is normal. Trace to mild tricuspid insufficiency is  present. The right ventricular systolic pressure is approximated at 17.2 mmHg  plus the right atrial pressure. Pulmonary artery systolic pressure is normal.     Pulmonic Valve  The pulmonic valve is normal.     Vessels  The aorta root is normal. The inferior vena cava is normal. Estimated mean  right atrial pressure is 3 mmHg (normal).     Pericardium  No pericardial effusion is present.    Brief HPI:  Manjinder Esparza is a 53 year old male with a history of hypertension who presents for  "evaluation of palpitations. Patient reports yesterday morning while exercising, he had an onset of dizziness, shortness of breath, vision changes, and near syncope. Since yesterday morning patient has had persistent shortness of breath at rest and worse with exertion, as well as palpitations described as \"fast heart beat\", and intermittent hot flashes.  Patient was found to be in new afib with rvr on arrival to ED.  Was given PO/IV Metoprolol for rate control.  Had a FRANCISCO JAVIER/Cardioversion which resulted in conversion to NSR.  Patient was also hypertensive while in the hospital BP, BP normalized after FRANCISCO JAVIER/DCCV    Hospital Course by Diagnosis:    1. New Atrial Fibrillation, with rvr: Patient reports over the past six months to one year he has had intermittent episodes of palpitations.  Currently feeling asymptomatic while in bed, some reported palpitations and lightheadedness with ambulation. In afib overnight, HR 's, on PO Metoprolol. Lab work unremarkable, trop negative x1. Echo EF 55-60%, otherwise normal. [id number] - 1 (HTN).       - FRANCISCO JAVIER/Cardioversion converted back to NSR after one shock with HR in 70's. Patient reports feeling much better, no longer having GROVE, no c/o dizziness  - Start 5 mg PO Apixaban BID  tonight and will need to remain on anticoagulation for 4 weeks  - Continue Metoprolol 50 mg PO BID; side effects explained to patient and significant other  - Follow up with EP as outpatient for follow up in 1month  - Lm as outpatient for 14 days  - Patient also educated about alcohol use, advised to cut down drinking to 1 drink/day, avoid binge (or \"weekend\") drinking      2. Hypertension: Pt BP remains elevated 130/100's, he has been on antihypertensives in the past but was been taken off of these medications by his PCP about 8 years ago after diet/weight management. Patient states baseline BP around 130-140/80-90's.       - After DCCV BP remained 120-130/90's  - Continue Metoprolol 50mg PO BID  - " Follow up with PCP (appt already made for 7/10)    Condition on discharge  Temp:  [98.1  F (36.7  C)-98.5  F (36.9  C)] 98.2  F (36.8  C)  Pulse:  [] 100  Heart Rate:  [] 100  Resp:  [16-22] 18  BP: (118-151)/() 140/95  SpO2:  [94 %-100 %] 99 %  General: Alert, interactive, NAD  Eyes: sclera anicteric, EOMI  Neck: carotid 2+ bilaterally  Cardiovascular: regular rate and rhythm, normal S1 and S2, no murmurs, gallops, or rubs  Resp: clear to auscultation bilaterally, no rales, wheezes, or rhonchi  GI: Soft, nontender, nondistended. +BS.  No HSM or masses, no rebound or guarding.  Extremities: no edema, no cyanosis or clubbing, dorsalis pedis and posterior tibialis pulses 2+ bilaterally  Skin: Warm and dry, no jaundice or rash  Neuro: CN 2-12 intact, moves all extremities equally  Psych: Alert & oriented x 3      Medication Changes:  - Metoprolol 50 mg BID for heart rate control/HTN  - Apixaban 5 mg BID x 4 weeks s/p DCCV    Discharge medications:   Current Discharge Medication List      START taking these medications    Details   apixaban ANTICOAGULANT (ELIQUIS) 5 MG tablet Take 1 tablet (5 mg) by mouth 2 times daily  Qty: 60 tablet, Refills: 0    Associated Diagnoses: New onset a-fib (H)      metoprolol tartrate (LOPRESSOR) 50 MG tablet Take 1 tablet (50 mg) by mouth 2 times daily  Qty: 60 tablet, Refills: 1    Associated Diagnoses: New onset a-fib (H)         CONTINUE these medications which have NOT CHANGED    Details   neomycin-polymyxin-hydrocortisone (CORTISPORIN) 3.5-26423-5 otic suspension Place 4 drops in ear(s) 4 times daily  Qty: 10 mL, Refills: 0    Associated Diagnoses: Acute otitis externa of left ear, unspecified type         STOP taking these medications       hydrochlorothiazide (HYDRODIURIL) 25 MG tablet Comments:   Reason for Stopping:               Labs or imaging requiring follow-up after discharge:  - Ziopatch monitor    Follow-up:  - EP in 1 month  - PCP in 1-2 weeks for  "hospital follow-up, HTN check    Code status:  Full    Keya Rosario, APRN, CNP  [facility]      During discharge of patient - I discussed following points with patient:  1. New Atrial Fibrillation, with rvr: Patient reports over the past six months to one year he has had intermittent episodes of palpitations.  Currently feeling asymptomatic while in bed, some reported palpitations and lightheadedness with ambulation. In afib overnight, HR 's, on PO Metoprolol. Lab work unremarkable, trop negative x1. Echo EF 55-60%, otherwise normal. [id number] - 1 (HTN).       - FRANCISCO JAVIER/Cardioversion converted back to NSR after one shock with HR in 70's. Patient reports feeling much better, no longer having GROVE, no c/o dizziness  - Start 5 mg PO Apixaban BID  tonight and will need to remain on anticoagulation for 4 weeks  - Continue Metoprolol 50 mg PO BID; side effects explained to patient and significant other  - Follow up with EP as outpatient for follow up in 1month  - Lm as outpatient for 14 days  - Patient also educated about alcohol use, advised to cut down drinking to 1 drink/day, avoid binge (or \"weekend\") drinking      2. Hypertension: Pt BP remains elevated 130/100's, he has been on antihypertensives in the past but was been taken off of these medications by his PCP about 8 years ago after diet/weight management. Patient states baseline BP around 130-140/80-90's.       - After DCCV BP remained 120-130/90's  - Continue Metoprolol 50mg PO BID  - Follow up with PCP (appt already made for 7/10)    My physical exam was the following:    Condition on discharge  Temp:  [98.1  F (36.7  C)-98.5  F (36.9  C)] 98.2  F (36.8  C)  Pulse:  [] 100  Heart Rate:  [] 100  Resp:  [16-22] 18  BP: (118-151)/() 140/95  SpO2:  [94 %-100 %] 99 %  General: Alert, interactive, NAD  Eyes: sclera anicteric, EOMI  Neck: carotid 2+ bilaterally  Cardiovascular: regular rate and rhythm, normal S1 and S2, no murmurs, " gallops, or rubs  Resp: clear to auscultation bilaterally, no rales, wheezes, or rhonchi  GI: Soft, nontender, nondistended. +BS.  No HSM or masses, no rebound or guarding.  Extremities: no edema, no cyanosis or clubbing, dorsalis pedis and posterior tibialis pulses 2+ bilaterally  Skin: Warm and dry, no jaundice or rash  Neuro: CN 2-12 intact, moves all extremities equally  Psych: Alert & oriented x 3    I summarized the results and discussed these with patient and with  JOHNNY Ribeiro, CNP who partipated in the discharge process.        During discharge 35 min were spent directly with patient.       Delta Ortiz MD, PhD  Professor of Medicine  Division of Cardiology

## 2018-06-29 NOTE — PROGRESS NOTES
[facility]   Cardiology   Progress Note     Interval History:  - No acute events overnight  - Patient remains in afib (HR 's) overnight, at time of interview -130's  - Patient also continues to endorse some GROVE and dizziness with activity.   - BP remains elevated, narrow pulse pressure, may be inaccurate r/t afib    Physical Exam:  Temp:  [97.7  F (36.5  C)-98.5  F (36.9  C)] 98.1  F (36.7  C)  Pulse:  [] 95  Heart Rate:  [] 79  Resp:  [16-22] 16  BP: (118-151)/() 129/102  SpO2:  [94 %-100 %] 99 %    I/O:   Intake/Output Summary (Last 24 hours) at 06/29/18 0752  Last data filed at 06/29/18 0659   Gross per 24 hour   Intake           551.63 ml   Output                0 ml   Net           551.63 ml       Wt:   Wt Readings from Last 5 Encounters:   06/29/18 82.8 kg (182 lb 9.6 oz)   06/28/18 83.9 kg (185 lb)   09/18/17 86.2 kg (190 lb)   11/17/16 87.1 kg (192 lb)   08/05/16 82.6 kg (182 lb)       General: NAD  HEENT:  PERRLA, EOMI. Sclera is non-icteric and conjunctiva is pink with no erythema. Oral mucosa moist, no oral lesions, good dentition.  Neck: No cervical/supraclavicular LAD.   CV:irregularly irregular, tachycardic. No murmur appreciated. No rubs or gallops. Peripheral radial pulse intact.  Resp: No increased work of breathing or use of accessory muscles, breathing comfortably on room air.  Lung sounds clear throughout/bilaterally  Abdomen: No obvious scars or hernias. Normal active bowel sounds.  Abdomen is soft. No distension, non-tender to palpation. No rebound tenderness or guarding. percussion WNL  :  No suprapubic tenderness.  MSK:  Upper and lower extremity muscle strength intact 5/5 bilaterally.  No large joint swelling or erythema.    Extremities: Warm. Capillary refill less than 3 sec. 2/4 radial pulses bilaterally.  2/4 pedal pulses bilaterally. No pre-tibial edema. No cyanosis or clubbing.  Lymphatic: No cervical, supraclavicular  LAD.  Skin:  Warm and dry. No erythema, rashes, ulceration or diaphoresis.  Neuro: CN II-XII grossly intact. Alert and oriented x3.  Moving all extremities equally.    Medications:    metoprolol tartrate  50 mg Oral BID     sodium chloride (PF)  3 mL Intracatheter Q8H     sodium chloride (PF)  3 mL Intracatheter Q8H       - MEDICATION INSTRUCTIONS -       HEParin 1,100 Units/hr (18 0432)     - MEDICATION INSTRUCTIONS -       - MEDICATION INSTRUCTIONS -       - MEDICATION INSTRUCTIONS -         Labs:   CMP  Recent Labs  Lab 18  0345 18  1829 18  0930   NA  --  141 143   POTASSIUM 4.2 4.2 4.1   CHLORIDE  --  107 109   CO2  --   --  25   ANIONGAP  --   --  9   GLC  --  132* 120*   BUN  --   --  17   CR  --   --  1.22   GFRESTIMATED  --   --  62   GFRESTBLACK  --   --  75   JESUS  --   --  8.4*   MAG 2.5*  --  2.2   PROTTOTAL  --   --  7.1   ALBUMIN  --   --  3.8   BILITOTAL  --   --  0.7   ALKPHOS  --   --  49   AST  --   --  16   ALT  --   --  22     CBC  Recent Labs  Lab 18  1303 18  0930   WBC 8.3 7.1   RBC 5.08 5.09   HGB 15.9 15.9   HCT 45.4 46.2   MCV 89 91   MCH 31.3 31.2   MCHC 35.0 34.4   RDW 12.6 12.7    159     INR  Recent Labs  Lab 18  0345   INR 1.16*     Arterial Blood GasNo lab results found in last 7 days.    Diagnostics:  EC18: Afib HR 92      Echo:  TTE 18:  Interpretation Summary  Left ventricular function, chamber size, wall motion, and wall thickness are  normal.The EF is 55-60%.  Normal right ventricular size and systolic function.  Mild left atrial enlargement is present.  No significant valve disease.    Left Ventricle  Left ventricular function, chamber size, wall motion, and wall thickness are  normal.The EF is 55-60%. Diastolic function not assessed due to atrial  fibrillation.     Right Ventricle  The right ventricle is normal size. Global right ventricular function is  normal.     Atria  The right atria appears normal. Mild left  "atrial enlargement is present.     Mitral Valve  The mitral valve is normal. Trace mitral insufficiency is present.     Aortic Valve  Aortic valve is normal in structure and function. The aortic valve is  tricuspid. No aortic regurgitation is present.     Tricuspid Valve  The tricuspid valve is normal. Trace to mild tricuspid insufficiency is  present. The right ventricular systolic pressure is approximated at 17.2 mmHg  plus the right atrial pressure. Pulmonary artery systolic pressure is normal.     Pulmonic Valve  The pulmonic valve is normal.     Vessels  The aorta root is normal. The inferior vena cava is normal. Estimated mean  right atrial pressure is 3 mmHg (normal).     Pericardium  No pericardial effusion is present.        Assessment and plan: Manjinder Esparza is a 53 year old male with a history of hypertension who presents for evaluation of palpitations. Patient reports yesterday morning while exercising, he had an onset of dizziness, shortness of breath, vision changes, and near syncope. Since yesterday morning patient has had persistent shortness of breath at rest and worse with exertion, as well as palpitations described as \"fast heart beat\", and intermittent hot flashes.      1. New Atrial Fibrillation, with rvr: Patient reports over the past six months to one year he has had intermittent episodes of palpitations.  Currently feeling asymptomatic while in bed, some reported palpitations and lightheadedness with ambulation. Remains in afib, HR 's, on PO Metoprolol. Lab work unremarkable, trop negative x1. Echo EF 55-60%, otherwise normal. [id number] - 1 (HTN). Patient remains symptomatic with activity- SOB, dizziness, palpitations.      - FRANCISCO JAVIER/Cardioversion later today  - Currently on heparin gtt, if not clot found on FRANCISCO JAVIER will transition to PO Apixaban tonight and will need to remain on anticoagulation for 4 weeks  - Continue Metoprolol 50 mg PO BID   - May need to adjust beta blocker dose based " "on heart rate and rhythm after cardioversion  - 2.5-5 mg IV Metoprolol prn for sustained HR>140.   - Follow up with EP as outpatient for follow up  - Patient also educated about alcohol use, advised to cut down drinking to 1 drink/day, avoid binge (or \"weekend\") drinking     2. Hypertension: Pt BP remains elevated 130/100's, he has been on antihypertensives in the past but was been taken off of these medications by his PCP about 8 years ago after diet/weight management. Patient states baseline BP around 130-140/80-90's.      - Continue Metoprolol 50mg PO BID  - Will follow up with BP, HR, and rhythm after cardioversion  - If BP remains elevated, could consider adding Norvasc 2.5mg for DBP control     FEN/Electrolytes: Currently NPO for FRANCISCO JAVIER/Cardioversion, resume Cardiac diet after procedure      Prophylaxis:   GI: prn Maalox  DVT: Currently on hep gtt, ambulate as tolerated      Code Status: FULL       Disposition: Anticipate return to prior living arrangement in today or tomorrow pending FRANCISCO JAVIER/Cardioversion time/results.       Patient seen and discussed with Dr. Ortiz, who agrees with above plan.    JOHNNY Ribeiro, CNP  [facility] Cardiology Team  [phone]    I interviewed and examined the patient with the house staff.  I agree with the assessment and plan as documented.      Delta Ortiz MD, PhD  Professor of Medicine  Division of Cardiology  "

## 2018-06-29 NOTE — PLAN OF CARE
Problem: Patient Care Overview  Goal: Plan of Care/Patient Progress Review  Outcome: Adequate for Discharge Date Met: 06/29/18  VSS, A&O X4.  A-fib with rate 90's into one teens.  FRANCISCO JAVIER with cardioversion.  Remained SR in 70's prior to discharge.  Zio patch on at discharge.  Hep gtt dc'ed, received first dose of po Eliquis.  Denied CP, SOB, dizziness.  Tolerating activity.  Tolerating regular diet.  Discussed AVS details with patient including new medications, follow up appointment, when to seek medical care, zio patch instructions.  He was able to state understanding of all AVS details.  Discharged home with his girlfriend.       Problem: Cardiac Disease Comorbidity  Goal: Cardiac Disease  Patient comorbidity will be monitored for signs and symptoms of Cardiac Disease.  Problems will be absent, minimized or managed by discharge/transition of care.  Rate controlled A-fib.  FRANCISCO JAVIER with cardioversion.  Remained SR in 70's prior to discharge.  Zio patch on at discharge.

## 2018-06-29 NOTE — OP NOTE
CARDIOVERSION    PROCEDURE:  direct current cardioversion  PROCEDURE DATE: 6/29/2018     Pre-procedure diagnosis:  Atrial fibrillation  Post-procedure diagnosis: s/p direct current cardioversion   Complications:  none    BRIEF CLINICAL HISTORY:  Please refer to inpatient Cardiology H&P     PROCEDURE:  The patient arrived at the Echo Laboratory in a fasting, non-sedated state.  Informed consent was previously obtained from patient, who understood indications, risks, and benefits of the procedure.  He is on heparin gtt. Transesophageal echo was performed by the echo lab team to rule out intracardiac thrombus.  With help from Anesthesia, patient was deeply sedated.  150J of synchronized biphasic shock was delivered through the cardiac monitor, and the patient was successfully converted to normal sinus rhythm.  After sedation wore off, patient awoke and remained neurologically intact.  The patient tolerated the procedure well from a hemodynamic and respiratory standpoint without any complications.  He was observed in the Echo Laboratory and then transferred back to inpatient unit after sedation wore off and he was ambulatory.    IMPRESSION:  1.  Successful direct current cardioversion with 150J biphasic shock.    RECOMMENDATIONS/PLANS:  1.   Follow-up with EP Cardiology in 4 weeks  2.   Continue anticoagulation    JOHNNY Lopez CNP  Electrophysiology Consult Service  Pager: [phone]

## 2018-06-29 NOTE — PLAN OF CARE
"Problem: Patient Care Overview  Goal: Plan of Care/Patient Progress Review  Outcome: Improving  D: New onset atrial fibrillation. Hx HTN. States stopped taking medication for this \"years ago.\" Arrived in clinic this AM with palpitations, dizziness, SOB.    I: Monitored vitals and assessed pt status. Wife at bed side.   Changed: given 10 mg labetalol IV for /108; 149/99. -1teens. Following administration, 130s/90s; HR: [id number]. Triggered sepsis protocol--lactic acid level 1.4. Cards I called Ete aware. No new orders.  Running: Heparin at 1000 units/hr into LPIV. RPIV SL. Next Xa at 1900  PRN: None given. Metoprolol IV for sustained HR above 140 available.    A: A0x4. VSS. Afebrile. Urinating adequately. B/P: 138/95, T: 98.3, P: 95, R: 18 O2: 97% RA. Rhythm: Afib. Denies pain. No edema. Denies chest pain. Denies SOB at rest. Can get SOB with activity. States \"light burning sensation that has been going on for months off and on especially at night\"--denies this at this time.      P: Continue to monitor Pt status and report changes to Cards I team. NPO at midnight. FRANCISCO JAVIER and cardioversion tomorrow AM.              "

## 2018-07-02 ENCOUNTER — TELEPHONE (OUTPATIENT)
Dept: FAMILY MEDICINE | Facility: CLINIC | Age: 53
End: 2018-07-02

## 2018-07-02 ENCOUNTER — CARE COORDINATION (OUTPATIENT)
Dept: CARE COORDINATION | Facility: CLINIC | Age: 53
End: 2018-07-02

## 2018-07-02 LAB — INTERPRETATION ECG - MUSE: NORMAL

## 2018-07-02 NOTE — TELEPHONE ENCOUNTER
Please call for In Patient follow up  Chief Complaint: Atrial Fibrillation, Unspecified Type (H), New Onset A-Fib (H)

## 2018-07-02 NOTE — TELEPHONE ENCOUNTER
ED / Discharge Outreach Protocol    Patient Contact    Attempt # 1    Was call answered?  No.  Left message on voicemail with information to call me back.    Angela Henderson RN

## 2018-07-03 ENCOUNTER — CARE COORDINATION (OUTPATIENT)
Dept: CARDIOLOGY | Facility: CLINIC | Age: 53
End: 2018-07-03

## 2018-07-03 NOTE — PROGRESS NOTES
Call to pts cell phone: unable to leave voice mail as mail box is full.     My chart message sent in follow up:    1. Need to clarify if pt was dc with a heart monitor.    2. Need to schedule EP follow up in 1 month. Pt lives in [place]- may be more convenient to see Dr Reaves, Dr Joe or Dr Meza at the [facility].

## 2018-07-03 NOTE — TELEPHONE ENCOUNTER
ED / Discharge Outreach Protocol    Patient Contact    Attempt # 2    Was call answered?  No.  Left message on voicemail with information to call me back.      Angela Henderson RN

## 2018-07-08 ENCOUNTER — NURSE TRIAGE (OUTPATIENT)
Dept: NURSING | Facility: CLINIC | Age: 53
End: 2018-07-08

## 2018-07-09 ENCOUNTER — CARE COORDINATION (OUTPATIENT)
Dept: CARDIOLOGY | Facility: CLINIC | Age: 53
End: 2018-07-09

## 2018-07-09 NOTE — TELEPHONE ENCOUNTER
"  Reason for Disposition    Dizziness or lightheadedness    Additional Information    Negative: Severe difficulty breathing (e.g., struggling for each breath, speaks in single words)    Negative: Difficult to awaken or acting confused (e.g., disoriented, slurred speech)    Negative: Shock suspected (e.g., cold/pale/clammy skin, too weak to stand, low BP, rapid pulse)    Negative: [1] Chest pain lasts > 5 minutes AND [2] history of heart disease  (i.e., heart attack, bypass surgery, angina, angioplasty, CHF; not just a heart murmur)    Negative: [1] Chest pain lasts > 5 minutes AND [2] described as crushing, pressure-like, or heavy    Negative: [1] Chest pain lasts > 5 minutes AND [2] age > 50    Negative: [1] Chest pain lasts > 5 minutes AND [2] age > 30 AND [3] at least one cardiac risk factor (i.e., hypertension, diabetes, obesity, smoker or strong family history of heart disease)    Negative: [1] Chest pain lasts > 5 minutes AND [2] not relieved with nitroglycerin    Negative: Passed out (i.e., lost consciousness, collapsed and was not responding)    Negative: Heart beating < 50 beats per minute OR > 140 beats per minute    Negative: Visible sweat on face or sweat dripping down face    Negative: Sounds like a life-threatening emergency to the triager    Negative: Followed a chest injury    Negative: SEVERE chest pain    Negative: [1] Intermittent  chest pain or \"angina\" AND [2] increasing in severity or frequency  (Exception: pains lasting a few seconds)    Negative: Pain also present in shoulder(s) or arm(s) or jaw  (Exception: pain is clearly made worse by movement)    Negative: Difficulty breathing    Protocols used: CHEST PAIN-ADULT-AH    Manjinder calls and says that for the past 4-5 days, he has had left, upper arm, left chest, and neck burning. Denies chest pain, but has the burning in his chest. Pt. Says that the burning feels different, from his previous heartburn episodes. Pt. Says that he recently had a " cardioversion, for his A-Fib, too. Pt. Is not sure if he will go to an [facility].

## 2018-07-09 NOTE — PROGRESS NOTES
"Spoke with pt. Stated he is wearing his heart monitor (ziopatch). Stated he feels that his c/o chest tightness was related to anxiety. Stated it occurred always at night or rest. Denied SOB, exertional chest pain, diaphoresis.   Reviewed echocardiogram -normal. Pt did c/o of lightheadedness, but feels it may be related to new medication: metoprolol.  B/p running in 130's.   Pt said he was having symptoms intermittently- also about 3 hours after a \"work out\"  Pt stated he has had multiple symptoms while wearing the heart monitor. Asked pt to mail the monitor today (early)  [employer] notified and asked for rapid read and download.  Pt schd to see Dr Hendrickson on Thursday at 4:00 pm      "

## 2018-07-10 ENCOUNTER — OFFICE VISIT (OUTPATIENT)
Dept: FAMILY MEDICINE | Facility: CLINIC | Age: 53
End: 2018-07-10
Payer: COMMERCIAL

## 2018-07-10 VITALS
TEMPERATURE: 99.7 F | OXYGEN SATURATION: 100 % | HEIGHT: 71 IN | WEIGHT: 184.75 LBS | RESPIRATION RATE: 20 BRPM | SYSTOLIC BLOOD PRESSURE: 138 MMHG | DIASTOLIC BLOOD PRESSURE: 89 MMHG | HEART RATE: 60 BPM | BODY MASS INDEX: 25.86 KG/M2

## 2018-07-10 DIAGNOSIS — I48.91 NEW ONSET A-FIB (H): Primary | ICD-10-CM

## 2018-07-10 DIAGNOSIS — I10 BENIGN ESSENTIAL HYPERTENSION: ICD-10-CM

## 2018-07-10 DIAGNOSIS — R73.09 ELEVATED GLUCOSE: ICD-10-CM

## 2018-07-10 DIAGNOSIS — R20.9 DISTURBANCE OF SKIN SENSATION: ICD-10-CM

## 2018-07-10 LAB
HBA1C MFR BLD: 5.4 % (ref 0–5.6)
VIT B12 SERPL-MCNC: 337 PG/ML (ref 193–986)

## 2018-07-10 PROCEDURE — 83036 HEMOGLOBIN GLYCOSYLATED A1C: CPT | Performed by: FAMILY MEDICINE

## 2018-07-10 PROCEDURE — 93000 ELECTROCARDIOGRAM COMPLETE: CPT | Performed by: FAMILY MEDICINE

## 2018-07-10 PROCEDURE — 82607 VITAMIN B-12: CPT | Performed by: FAMILY MEDICINE

## 2018-07-10 PROCEDURE — 99214 OFFICE O/P EST MOD 30 MIN: CPT | Performed by: FAMILY MEDICINE

## 2018-07-10 PROCEDURE — 36415 COLL VENOUS BLD VENIPUNCTURE: CPT | Performed by: FAMILY MEDICINE

## 2018-07-10 NOTE — PROGRESS NOTES
SUBJECTIVE:   Manjinder Esparza is a 53 year old male who presents to clinic today for the following health issues:          Hospital Follow-up Visit:    Hospital/Nursing Home/IP Rehab Facility: [facility]  Date of Admission: 06/28/2018  Date of Discharge: 06/29/2018  Reason(s) for Admission: New Atrial Fibrillation with rvr and Hypertension            Problems taking medications regularly:  no       Medication changes since discharge: eliquis 5mg and metoprolol 50mg        Problems adhering to non-medication therapy:  None    Summary of hospitalization:  [facility] discharge summary reviewed  Diagnostic Tests/Treatments reviewed.  Follow up needed: none  Other Healthcare Providers Involved in Patient s Care:         cardiology  Update since discharge: stable.      Post Discharge Medication Reconciliation: discharge medications reconciled, continue medications without change.  Plan of care communicated with patient     Coding guidelines for this visit:  Type of Medical   Decision Making Face-to-Face Visit       within 7 Days of discharge Face-to-Face Visit        within 14 days of discharge   Moderate Complexity [id number]   High Complexity [id number]          Rough week.  Reactions to the drug, feeling of Afib, short of breath, hard to sleep.     Since discharge - burning sensation of various parts of body - mostly upper arm area.    Nausea present.     When he falls asleep, he notices his extremities are getting     lightheaded and headache.     Going to see cardiologist on Thursday. Had holter until last night.   Feels he is still in afib.          Problem list and histories reviewed & adjusted, as indicated.  Additional history: as documented    Labs reviewed in EPIC    Reviewed and updated as needed this visit by clinical staff    Reviewed and updated as needed this visit by Provider      Social History     Social History     Marital status: Single     Spouse name: N/A     Number  of children: 0     Years of education: 20     Occupational History     finance       Social History Main Topics     Smoking status: Never Smoker     Smokeless tobacco: Never Used     Alcohol use 3.6 - 5.4 oz/week     6 - 9 Cans of beer per week      Comment: 12 drinks a week      Drug use: No     Sexual activity: Yes     Partners: Female     Birth control/ protection: Condom     Other Topics Concern     Parent/Sibling W/ Cabg, Mi Or Angioplasty Before 65f 55m? No     Social History Narrative    Balanced Diet - Yes, has been trying to lately, eats out a lot and works alot    Osteoporosis Preventative measures-  Dairy servings per day: 2 and Weight bearing exercise    Regular Exercise -  Yes Describe 4-5 times a week cardio and weight lifting    Dental Exam up - YES - Date: 5-6  months ago    Eye Exam - YES - Date: had the lasix surgery 1 1/2 years ago    Self Testicular Exam -  No    Do you have any concerns about STD's -  No concerns about stds, would like to be checked, starting a new relationship    Abuse: Current or Past (Physical, Sexual or Emotional)- No    Do you feel safe in your environment - Yes    Guns stored in the home - Yes, shot gun    Sunscreen used - occassionally    Seatbelts used - Yes    Lipids - YES - Date: awhile ago, was really low    Glucose -  UNKNOWN, would like to be checked    Colon Cancer Screening - No    Hemoccults - NO    PSA - YES - Date: 3-4 years ago, would like to have it checked again due to his father having prostate cancer    Digital Rectal Exam - YES - Date: 3-4 years ago    Immunizations reviewed and up to date - Yes, had a tetanus injection 2-3 years ago when he had a tooth pulled    Merle Childs ma  2009                 Allergies   Allergen Reactions     Lisinopril      cough     Patient Active Problem List   Diagnosis     CARDIOVASCULAR SCREENING; LDL GOAL LESS THAN 160     Benign essential hypertension     New onset a-fib (H)     Reviewed medications, social  "history and  past medical and surgical history.    Review of system: for general, respiratory, CVS, GI and psychiatry negative except for noted above.     EXAM:  /89 (BP Location: Left arm, Patient Position: Sitting, Cuff Size: Adult Regular)  Pulse 60  Temp 99.7  F (37.6  C) (Oral)  Resp 20  Ht 5' 10.5\" (1.791 m)  Wt 184 lb 12 oz (83.8 kg)  SpO2 100%  BMI 26.13 kg/m2  Constitutional: healthy, alert and no distress   Psychiatric: mentation appears normal and affect normal/bright  Cardiovascular: few irregular beats. No murmurs,  Respiratory: negative, Lungs clear. No crackles or wheezing. No tachypnea.      ASSESSMENT / PLAN:   (I48.91) New onset a-fib (H)  (primary encounter diagnosis)  Comment: Patient still has a persistent symptoms of A. fib.  He ended his Holter monitoring yesterday.  We will obtain another EKG today due to his symptoms and I do not see any new EKG.  He is already on Eliquis and metoprolol.  He is seeing cardiologist in near future and further treatment as per cardiology.  He has having multiple symptoms which I can attribute to his new medication especially metoprolol.  He has some neuropathy type of symptoms and I am going to obtain some basic blood work for that.  Somewhat of an anxiety may be playing a role.    Plan: EKG 12-lead complete w/read - Clinics            (I10) Benign essential hypertension  Comment:   Plan: Blood pressure slightly on higher side but we agreed to hold off on further intervention especially due to his side effects.    (R20.9) Disturbance of skin sensation  Comment: not sure if it is from any deficiency. Check for B12 and his glucose wsa high last few time, obtain A1c today.   Plan: Vitamin B12              (R73.09) Elevated glucose  Comment: see above.    Plan: Hemoglobin A1c               "

## 2018-07-10 NOTE — MR AVS SNAPSHOT
After Visit Summary   7/10/2018    Manjinder Esparza    MRN: [id number]           Patient Information     Date Of Birth          1965        Visit Information        Provider Department      7/10/2018 11:00 AM Lemuel Swan MD [facility]        Today's Diagnoses     New onset a-fib (H)    -  1    Benign essential hypertension        Disturbance of skin sensation        Elevated glucose           Follow-ups after your visit        Your next 10 appointments already scheduled     Jul 12, 2018  4:00 PM CDT   (Arrive by 3:45 PM)   ATRIAL FIBULATION VISIT with Tr Hendrickson MD   [facility] ([facility])    [address] [place] [address]  [address] [place] [address] [phone]            Jul 19, 2018 12:30 PM CDT   (Arrive by 12:15 PM)   Return Visit with JOHNNY Santoyo [facility])    [address] [place] [address]  [address] [place] [address] [phone]              Who to contact     If you have questions or need follow up information about today's clinic visit or your schedule please contact [facility] directly at [phone].  Normal or non-critical lab and imaging results will be communicated to you by MyChart, letter or phone within 4 business days after the clinic has received the results. If you do not hear from us within 7 days, please contact the clinic through MyChart or phone. If you have a critical or abnormal lab result, we will notify you by phone as soon as possible.  Submit refill requests through NPC III or call your pharmacy and they will forward the refill request to us. Please allow 3 business days for your refill to be completed.          Additional Information About Your Visit        Writer.lyhart Information     NPC III gives you secure access to your electronic health record. If you see a primary care provider, you can also send  "messages to your care team and make appointments. If you have questions, please call your primary care clinic.  If you do not have a primary care provider, please call [phone] and they will assist you.        Care EveryWhere ID     This is your Care EveryWhere ID. This could be used by other organizations to access your [facility] medical records  [id number]        Your Vitals Were     Pulse Temperature Respirations Height Pulse Oximetry BMI (Body Mass Index)    60 99.7  F (37.6  C) (Oral) 20 5' 10.5\" (1.791 m) 100% 26.13 kg/m2       Blood Pressure from Last 3 Encounters:   07/10/18 138/89   06/29/18 115/90   06/28/18 133/89    Weight from Last 3 Encounters:   07/10/18 184 lb 12 oz (83.8 kg)   06/29/18 182 lb 9.6 oz (82.8 kg)   06/28/18 185 lb (83.9 kg)              We Performed the Following     EKG 12-lead complete w/read - Clinics     Hemoglobin A1c     Vitamin B12        Primary Care Provider Office Phone # Fax #    Honey Gaspar [phone]       [address] [place] [address]        Equal Access to Services     AMY ASTUDILLO : Hadii aad ku hadasho Soomaali, waaxda luqadaha, qaybta kaalmada adeegyada, waxay idiin haybeverleyn cinthia beckford . So [facility] [phone].    ATENCIÓN: Si habla español, tiene a hastings disposición servicios gratuitos de asistencia lingüística. Llame al [phone].    We comply with applicable federal civil rights laws and [place] laws. We do not discriminate on the basis of race, color, national origin, age, disability, sex, sexual orientation, or gender identity.            Thank you!     Thank you for choosing [facility]  for your care. Our goal is always to provide you with excellent care. Hearing back from our patients is one way we can continue to improve our services. Please take a few minutes to complete the written survey that you may receive in the mail after your visit with us. Thank you!             Your Updated Medication List " - Protect others around you: Learn how to safely use, store and throw away your medicines at www.disposemymeds.org.          This list is accurate as of 7/10/18  1:20 PM.  Always use your most recent med list.                   Brand Name Dispense Instructions for use Diagnosis    apixaban ANTICOAGULANT 5 MG tablet    ELIQUIS    60 tablet    Take 1 tablet (5 mg) by mouth 2 times daily    New onset a-fib (H)       metoprolol tartrate 50 MG tablet    LOPRESSOR    60 tablet    Take 1 tablet (50 mg) by mouth 2 times daily    New onset a-fib (H)

## 2018-07-11 ENCOUNTER — PRE VISIT (OUTPATIENT)
Dept: CARDIOLOGY | Facility: CLINIC | Age: 53
End: 2018-07-11

## 2018-07-11 NOTE — TELEPHONE ENCOUNTER
HPI:     PAST MEDICAL HISTORY:  No past medical history on file.    CURRENT MEDICATIONS:  Current Outpatient Prescriptions   Medication Sig Dispense Refill     apixaban ANTICOAGULANT (ELIQUIS) 5 MG tablet Take 1 tablet (5 mg) by mouth 2 times daily 60 tablet 0     metoprolol tartrate (LOPRESSOR) 50 MG tablet Take 1 tablet (50 mg) by mouth 2 times daily 60 tablet 1       PAST SURGICAL HISTORY:  Past Surgical History:   Procedure Laterality Date     ANESTHESIA CARDIOVERSION N/A 6/29/2018    Procedure: ANESTHESIA CARDIOVERSION;  Cardioversion;  Surgeon: GENERIC ANESTHESIA PROVIDER;  Location: [facility] OR     SURGICAL HISTORY OF -       animal bone graft in the left lower jaw       ALLERGIES:     Allergies   Allergen Reactions     Lisinopril      cough       FAMILY HISTORY:  Family History   Problem Relation Age of Onset     Diabetes Mother      type 2     Hypertension Mother      Cancer - colorectal Mother      Circulatory Mother      varicose veins     Eye Disorder Mother      cataracts     Hypertension Father      Prostate Cancer Father      Arthritis Father      in his knees     Lipids Father      Eye Disorder Maternal Grandmother      cataracts     Cancer Maternal Grandmother      Cancer Maternal Grandfather      lung     Cerebrovascular Disease Paternal Grandmother      Allergies Other      self, developing allergies      GASTROINTESTINAL DISEASE Other      self, may have ibs     Lipids Brother      Prostate Cancer Brother        SOCIAL HISTORY:  Social History   Substance Use Topics     Smoking status: Never Smoker     Smokeless tobacco: Never Used     Alcohol use 3.6 - 5.4 oz/week     6 - 9 Cans of beer per week      Comment: 12 drinks a week        ROS:   Constitutional: No fever, chills, or sweats. Weight stable.   ENT: No visual disturbance, ear ache, epistaxis, sore throat.   Cardiovascular: As per HPI.   Respiratory: No cough, hemoptysis.    GI: No nausea, vomiting, hematemesis, melena, or hematochezia.   : No  hematuria.   Integument: Negative.   Psychiatric: Negative.   Hematologic:  Easy bruising, no easy bleeding.  Neuro: Negative.   Endocrinology: No significant heat or cold intolerance   Musculoskeletal: No myalgia.    Exam:  There were no vitals taken for this visit.  GENERAL APPEARANCE: healthy, alert and no distress  HEENT: no icterus, no xanthelasmas, normal pupil size and reaction, normal palate, mucosa moist, no central cyanosis  NECK: no adenopathy, no asymmetry, masses, or scars, thyroid normal to palpation and no bruits, JVP not elevated  RESPIRATORY: lungs clear to auscultation - no rales, rhonchi or wheezes, no use of accessory muscles, no retractions, respirations are unlabored, normal respiratory rate  CARDIOVASCULAR: regular rhythm, normal S1 with physiologic split S2, no S3 or S4 and no murmur, click or rub, precordium quiet with normal PMI.  ABDOMEN: soft, non tender, without hepatosplenomegaly, no masses palpable, bowel sounds normal, aorta not enlarged by palpation, no abdominal bruits  EXTREMITIES: peripheral pulses normal, no edema, no bruits  NEURO: alert and oriented to person/place/time, normal speech, gait and affect  VASC: Radial, femoral, dorsalis pedis and posterior tibialis pulses are normal in volumes and symmetric bilaterally. No bruits are heard.  SKIN: no ecchymoses, no rashes    Labs:  CBC RESULTS:   Lab Results   Component Value Date    WBC 8.3 06/28/2018    RBC 5.08 06/28/2018    HGB 15.9 06/28/2018    HCT 45.4 06/28/2018    MCV 89 06/28/2018    MCH 31.3 06/28/2018    MCHC 35.0 06/28/2018    RDW 12.6 06/28/2018     06/28/2018       BMP RESULTS:  Lab Results   Component Value Date     06/28/2018    POTASSIUM 4.2 06/29/2018    CHLORIDE 107 06/28/2018    CO2 25 06/28/2018    ANIONGAP 9 06/28/2018     (H) 06/28/2018    BUN 17 06/28/2018    CR 1.17 06/29/2018    GFRESTIMATED 65 06/29/2018    GFRESTBLACK 79 06/29/2018    JESUS 8.4 (L) 06/28/2018        INR RESULTS:  Lab  Results   Component Value Date    INR 1.16 (H) 06/29/2018       Procedures:    Ziopatch Pending    6/28/18: FRANCISCO JAVIER/Cardioversion:      6/28/18: Echocardiogram:           Interpretation Summary  Left ventricular function, chamber size, wall motion, and wall thickness are  normal.The EF is 55-60%.  Normal right ventricular size and systolic function.  Mild left atrial enlargement is present.  No significant valve disease.  _____________________________________________________________________________  __        Left Ventricle  Left ventricular function, chamber size, wall motion, and wall thickness are  normal.The EF is 55-60%. Diastolic function not assessed due to atrial  fibrillation.     Right Ventricle  The right ventricle is normal size. Global right ventricular function is  normal.     Atria  The right atria appears normal. Mild left atrial enlargement is present.        Mitral Valve  The mitral valve is normal. Trace mitral insufficiency is present.     Aortic Valve  Aortic valve is normal in structure and function. The aortic valve is  tricuspid. No aortic regurgitation is present.     Tricuspid Valve  The tricuspid valve is normal. Trace to mild tricuspid insufficiency is  present. The right ventricular systolic pressure is approximated at 17.2 mmHg  plus the right atrial pressure. Pulmonary artery systolic pressure is normal.     Pulmonic Valve  The pulmonic valve is normal.     Vessels  The aorta root is normal. The inferior vena cava is normal. Estimated mean  right atrial pressure is 3 mmHg (normal).     Pericardium  No pericardial effusion is present.     _____________________________________________________________________________  __  MMode/2D Measurements & Calculations  RVDd: 3.6 cm  IVSd: 0.97 cm  LVIDd: 5.1 cm  LVIDs: 3.7 cm  LVPWd: 0.79 cm     FS: 28.3 %  LV mass(C)d: 159.8 grams  LV mass(C)dI: 469.9 grams/m2  asc Aorta Diam: 3.6 cm  LVOT diam: 2.2 cm  LVOT area: 4.0 cm2  LA Volume (BP): 81.7  ml  RWT: 0.31  TAPSE: 2.0 cm        Doppler Measurements & Calculations  TV max P.2 mmHg  TR max noe: 207.3 cm/sec  TR max P.2 mmHg        Assessment and Plan:         I very much appreciated the opportunity to see and assess this patient in the clinic with Cardiovascular Fellow        I agree with the above note which summarizes my findings and current recommendations.      Please do not hesitate to contact my office if you have any questions or concerns.       Tr Hendrickson MD  Cardiac Arrhythmia Service  [facility]  [phone]        CC

## 2018-07-12 ENCOUNTER — OFFICE VISIT (OUTPATIENT)
Dept: CARDIOLOGY | Facility: CLINIC | Age: 53
End: 2018-07-12
Attending: INTERNAL MEDICINE
Payer: COMMERCIAL

## 2018-07-12 VITALS
BODY MASS INDEX: 26.56 KG/M2 | HEIGHT: 71 IN | WEIGHT: 189.7 LBS | DIASTOLIC BLOOD PRESSURE: 85 MMHG | HEART RATE: 58 BPM | SYSTOLIC BLOOD PRESSURE: 134 MMHG | OXYGEN SATURATION: 98 %

## 2018-07-12 DIAGNOSIS — I10 HYPERTENSION, UNSPECIFIED TYPE: ICD-10-CM

## 2018-07-12 DIAGNOSIS — I48.91 NEW ONSET A-FIB (H): Primary | ICD-10-CM

## 2018-07-12 PROCEDURE — 99203 OFFICE O/P NEW LOW 30 MIN: CPT | Mod: GC | Performed by: INTERNAL MEDICINE

## 2018-07-12 PROCEDURE — G0463 HOSPITAL OUTPT CLINIC VISIT: HCPCS | Mod: ZF

## 2018-07-12 PROCEDURE — 93005 ELECTROCARDIOGRAM TRACING: CPT

## 2018-07-12 RX ORDER — AMLODIPINE BESYLATE 5 MG/1
5 TABLET ORAL DAILY
Qty: 90 TABLET | Refills: 3 | Status: SHIPPED | OUTPATIENT
Start: 2018-07-12 | End: 2018-08-06

## 2018-07-12 ASSESSMENT — PAIN SCALES - GENERAL: PAINLEVEL: NO PAIN (0)

## 2018-07-12 NOTE — MR AVS SNAPSHOT
After Visit Summary   7/12/2018    Manjinder Esparza    MRN: [id number]           Patient Information     Date Of Birth          1965        Visit Information        Provider Department      7/12/2018 4:00 PM Tr Hendrickson MD [facility]        Today's Diagnoses     New onset a-fib (H)    -  1    HTN (hypertension)          Care Instructions    You will be scheduled for a follow up visit: 3 months:  September 6th at 430 pm    New Medications:   norvasc 5 mg once daily  Stop metoprolol      We encourage you to use My Chart as your primary form of communication if possible. If you need assistance in setting this up, please contact our office or ask at your follow up visit.     If you need a medication refill please contact your pharmacy. Please allow at least 3 business days for your refill to be completed.       Cardiology  Telephone Number    Simi Tellez -[phone]   Or send a message to your provider via my chart.   For scheduling procedures:    [facility]    Clinic appointments       [phone]   For the Device Clinic (Pacemakers and ICD's)   RN's :   Larisa Lane  During business hours: [phone]    After business hours:   [phone]- select option 4 and ask for job code [id number].          As always, Thank you for trusting us with your health care needs!          Follow-ups after your visit        Additional Services     Follow-Up with Electrophysiologist - 3 Months                 Your next 10 appointments already scheduled     Jul 19, 2018 12:30 PM CDT   (Arrive by 12:15 PM)   Return Visit with JOHNNY Santoyo CNP   [facility] ([facility])    [address] [place] [address]  [address] [place] [address] [phone]              Future tests that were ordered for you today     Open Future Orders        Priority Expected Expires Ordered    Follow-Up with Electrophysiologist - 3 Months  "Routine 10/10/2018 1/8/2019 7/12/2018            Who to contact     If you have questions or need follow up information about today's clinic visit or your schedule please contact [facility] directly at [phone].  Normal or non-critical lab and imaging results will be communicated to you by Gudoghart, letter or phone within 4 business days after the clinic has received the results. If you do not hear from us within 7 days, please contact the clinic through [employer] USAt or phone. If you have a critical or abnormal lab result, we will notify you by phone as soon as possible.  Submit refill requests through B[employer] or call your pharmacy and they will forward the refill request to us. Please allow 3 business days for your refill to be completed.          Additional Information About Your Visit        Breeze Technology Information     [employer] gives you secure access to your electronic health record. If you see a primary care provider, you can also send messages to your care team and make appointments. If you have questions, please call your primary care clinic.  If you do not have a primary care provider, please call [phone] and they will assist you.        Care EveryWhere ID     This is your Care EveryWhere ID. This could be used by other organizations to access your [facility] medical records  [id number]        Your Vitals Were     Pulse Height Pulse Oximetry BMI (Body Mass Index)          58 1.791 m (5' 10.5\") 98% 26.83 kg/m2         Blood Pressure from Last 3 Encounters:   07/12/18 134/85   07/10/18 138/89   06/29/18 115/90    Weight from Last 3 Encounters:   07/12/18 86 kg (189 lb 11.2 oz)   07/10/18 83.8 kg (184 lb 12 oz)   06/29/18 82.8 kg (182 lb 9.6 oz)              We Performed the Following     EKG 12-lead, tracing only (Same Day)          Today's Medication Changes          These changes are accurate as of 7/12/18  5:19 PM.  If you have any questions, ask your nurse or doctor.               Start taking these " medicines.        Dose/Directions    amLODIPine 5 MG tablet   Commonly known as:  NORVASC   Used for:  HTN (hypertension)   Started by:  Tr Hendrickson MD        Dose:  5 mg   Take 1 tablet (5 mg) by mouth daily   Quantity:  90 tablet   Refills:  3         Stop taking these medicines if you haven't already. Please contact your care team if you have questions.     metoprolol tartrate 50 MG tablet   Commonly known as:  LOPRESSOR   Stopped by:  Tr Hendrickson MD                Where to get your medicines      These medications were sent to [id number] - [place], [place] - [address] AT [facility] [place] [address]  [address], [place] [address]    Hours:  24-hours Phone:  [phone]     amLODIPine 5 MG tablet                Primary Care Provider Office Phone # Fax #    Honey MAGGIE Gaspar [phone] [address] [facility] [address]        Equal Access to Services     [facility] AH: Hadii aad ku hadasho Soomaali, waaxda luqadaha, qaybta kaalmada adeegyada, waxay idiin hayaan adeeg kharabetty la'beverleyn . So [facility] [phone].    ATENCIÓN: Si habla español, tiene a hastings disposición servicios gratuitos de asistencia lingüística. [facility] [phone].    We comply with applicable federal civil rights laws and [place] laws. We do not discriminate on the basis of race, color, national origin, age, disability, sex, sexual orientation, or gender identity.            Thank you!     Thank you for choosing [facility]  for your care. Our goal is always to provide you with excellent care. Hearing back from our patients is one way we can continue to improve our services. Please take a few minutes to complete the written survey that you may receive in the mail after your visit with us. Thank you!             Your Updated Medication List - Protect others around you: Learn how to safely use, store and throw away your medicines at www.disposemymeds.org.           This list is accurate as of 7/12/18  5:19 PM.  Always use your most recent med list.                   Brand Name Dispense Instructions for use Diagnosis    amLODIPine 5 MG tablet    NORVASC    90 tablet    Take 1 tablet (5 mg) by mouth daily    HTN (hypertension)       apixaban ANTICOAGULANT 5 MG tablet    ELIQUIS    60 tablet    Take 1 tablet (5 mg) by mouth 2 times daily    New onset a-fib (H)

## 2018-07-12 NOTE — NURSING NOTE
Chief Complaint   Patient presents with     New Patient     EKG- afib, new onset atrial fibrillation      Vitals were taken and medications were reconciled.  LLOYD Townsend  4:03 PM

## 2018-07-12 NOTE — PROGRESS NOTES
HPI:     53 year old male with a history of hypertension and recent admission for afib presents for f/u. Patient reports presenting for afib while exercising on 6/28, as he had an onset of dizziness, shortness of breath, vision changes, and near syncope. Patient was found to be in new afib with rvr on arrival to ED.  Was given PO/IV Metoprolol for rate control.  Had a FRANCISCO JAVIER/Cardioversion which resulted in conversion to NSR.  Lab work unremarkable, trop negative x1. Echo EF 55-60%, otherwise I just need to work with normal. [id number] - 1 (HTN). He was started on 5 mg PO Apixaban BID and will remain on anticoagulation for 3 months, and continued Metoprolol 50 mg PO BID. His Ziopatch showed no sustained afib - really only showed 3 runs of NSVT that did not correlate to patient's symptoms of palpitations (very short runs).    Patient states that he has done well since his discharge.  He has experienced some episodes of anxiety because he is not very concerned about his atrial fibrillation.  After asking around his family it was noted to him that his mother and his older brother both have a diagnosis of atrial fibrillation that they currently deal with.  Patient states that he also has experienced some needles and pins sensation in his fingers at times, although he states that all these symptoms could be related to anxiety as he is experiencing from the atrial fibrillation diagnosis.  Patient states that he continues to take his apixaban and his metoprolol as scheduled.  He inquires about exercise and how much exercise he should be doing.  He is also checking his blood pressure daily and he has found it to be better controlled on the metoprolol.        PAST MEDICAL HISTORY:  No past medical history on file.    CURRENT MEDICATIONS:  Current Outpatient Prescriptions   Medication Sig Dispense Refill     amLODIPine (NORVASC) 5 MG tablet Take 1 tablet (5 mg) by mouth daily 90 tablet 3     apixaban ANTICOAGULANT (ELIQUIS) 5 MG  tablet Take 1 tablet (5 mg) by mouth 2 times daily 60 tablet 0       PAST SURGICAL HISTORY:  Past Surgical History:   Procedure Laterality Date     ANESTHESIA CARDIOVERSION N/A 6/29/2018    Procedure: ANESTHESIA CARDIOVERSION;  Cardioversion;  Surgeon: GENERIC ANESTHESIA PROVIDER;  Location: [facility] OR     SURGICAL HISTORY OF -       animal bone graft in the left lower jaw       ALLERGIES:     Allergies   Allergen Reactions     Lisinopril      cough       FAMILY HISTORY:  Family History   Problem Relation Age of Onset     Diabetes Mother      type 2     Hypertension Mother      Cancer - colorectal Mother      Circulatory Mother      varicose veins     Eye Disorder Mother      cataracts     Hypertension Father      Prostate Cancer Father      Arthritis Father      in his knees     Lipids Father      Eye Disorder Maternal Grandmother      cataracts     Cancer Maternal Grandmother      Cancer Maternal Grandfather      lung     Cerebrovascular Disease Paternal Grandmother      Allergies Other      self, developing allergies      GASTROINTESTINAL DISEASE Other      self, may have ibs     Lipids Brother      Prostate Cancer Brother      - Premature coronary artery disease  - Atrial fibrillation  - Sudden cardiac death     SOCIAL HISTORY:  Social History   Substance Use Topics     Smoking status: Never Smoker     Smokeless tobacco: Never Used     Alcohol use 3.6 - 5.4 oz/week     6 - 9 Cans of beer per week      Comment: 12 drinks a week        ROS:   Constitutional: No fever, chills, or sweats. Weight stable.   ENT: No visual disturbance, ear ache, epistaxis, sore throat.   Cardiovascular: As per HPI.   Respiratory: No cough, hemoptysis.    GI: No nausea, vomiting, hematemesis, melena, or hematochezia.   : No hematuria.   Integument: Negative.   Psychiatric: Negative.   Hematologic:  Easy bruising, no easy bleeding.  Neuro: Negative.   Endocrinology: No significant heat or cold intolerance   Musculoskeletal: No  "myalgia.    Exam:  /85 (BP Location: Left arm, Patient Position: Chair, Cuff Size: Adult Regular)  Pulse 58  Ht 1.791 m (5' 10.5\")  Wt 86 kg (189 lb 11.2 oz)  SpO2 98%  BMI 26.83 kg/m2  GENERAL APPEARANCE: healthy, alert and no distress  HEENT: no icterus, no xanthelasmas, normal pupil size and reaction, normal palate, mucosa moist, no central cyanosis  NECK: no adenopathy, no asymmetry, masses, or scars, thyroid normal to palpation and no bruits, JVP not elevated  RESPIRATORY: lungs clear to auscultation - no rales, rhonchi or wheezes, no use of accessory muscles, no retractions, respirations are unlabored, normal respiratory rate  CARDIOVASCULAR: regular rhythm, normal S1 with physiologic split S2, no S3 or S4 and no murmur, click or rub, precordium quiet with normal PMI.  ABDOMEN: soft, non tender, without hepatosplenomegaly, no masses palpable, bowel sounds normal, aorta not enlarged by palpation, no abdominal bruits  EXTREMITIES: peripheral pulses normal, no edema, no bruits  NEURO: alert and oriented to person/place/time, normal speech, gait and affect  VASC: Radial, femoral, dorsalis pedis and posterior tibialis pulses are normal in volumes and symmetric bilaterally. No bruits are heard.  SKIN: no ecchymoses, no rashes    Labs:  CBC RESULTS:   Lab Results   Component Value Date    WBC 8.3 06/28/2018    RBC 5.08 06/28/2018    HGB 15.9 06/28/2018    HCT 45.4 06/28/2018    MCV 89 06/28/2018    MCH 31.3 06/28/2018    MCHC 35.0 06/28/2018    RDW 12.6 06/28/2018     06/28/2018       BMP RESULTS:  Lab Results   Component Value Date     06/28/2018    POTASSIUM 4.2 06/29/2018    CHLORIDE 107 06/28/2018    CO2 25 06/28/2018    ANIONGAP 9 06/28/2018     (H) 06/28/2018    BUN 17 06/28/2018    CR 1.17 06/29/2018    GFRESTIMATED 65 06/29/2018    GFRESTBLACK 79 06/29/2018    JESUS 8.4 (L) 06/28/2018        INR RESULTS:  Lab Results   Component Value Date    INR 1.16 (H) 06/29/2018 "       Procedures:  PULMONARY FUNCTION TESTS:   No flowsheet data found.      ECHOCARDIOGRAM:   Recent Results (from the past 8760 hour(s))   Echocardiogram Complete    Narrative    [id number]  ECH19  [id number]  [id number]^EMERY^BRIGHT^ZAIRE           [facility],[place]  Echocardiography Laboratory  [address] [place] [id number]     Name: ROCCO CASSIDY  MRN: [id number]  : 1965  Study Date: 2018 12:33 PM  Age: 53 yrs  Gender: Male  Patient [facility]: [facility]  Reason For Study: Afib  Ordering Physician: BRIGHT LAND  Performed By: Cristobal Jacobs RDCS     BSA: 0.34 m2  Height: 6 in  Weight: 185 lb  BP: 141/109 mmHg  _____________________________________________________________________________  __        Procedure  Complete Portable Echo Adult. Echocardiogram with two-dimensional, color and  spectral Doppler performed.  _____________________________________________________________________________  __        Interpretation Summary  Left ventricular function, chamber size, wall motion, and wall thickness are  normal.The EF is 55-60%.  Normal right ventricular size and systolic function.  Mild left atrial enlargement is present.  No significant valve disease.  _____________________________________________________________________________  __        Left Ventricle  Left ventricular function, chamber size, wall motion, and wall thickness are  normal.The EF is 55-60%. Diastolic function not assessed due to atrial  fibrillation.     Right Ventricle  The right ventricle is normal size. Global right ventricular function is  normal.     Atria  The right atria appears normal. Mild left atrial enlargement is present.        Mitral Valve  The mitral valve is normal. Trace mitral insufficiency is present.     Aortic Valve  Aortic valve is normal in structure and function. The aortic valve is  tricuspid. No aortic regurgitation is present.     Tricuspid Valve  The tricuspid valve is normal.  Trace to mild tricuspid insufficiency is  present. The right ventricular systolic pressure is approximated at 17.2 mmHg  plus the right atrial pressure. Pulmonary artery systolic pressure is normal.     Pulmonic Valve  The pulmonic valve is normal.     Vessels  The aorta root is normal. The inferior vena cava is normal. Estimated mean  right atrial pressure is 3 mmHg (normal).     Pericardium  No pericardial effusion is present.     _____________________________________________________________________________  __  MMode/2D Measurements & Calculations  RVDd: 3.6 cm  IVSd: 0.97 cm  LVIDd: 5.1 cm  LVIDs: 3.7 cm  LVPWd: 0.79 cm     FS: 28.3 %  LV mass(C)d: 159.8 grams  LV mass(C)dI: 469.9 grams/m2  asc Aorta Diam: 3.6 cm  LVOT diam: 2.2 cm  LVOT area: 4.0 cm2  LA Volume (BP): 81.7 ml  RWT: 0.31  TAPSE: 2.0 cm        Doppler Measurements & Calculations  TV max P.2 mmHg  TR max noe: 207.3 cm/sec  TR max P.2 mmHg        _____________________________________________________________________________  __        Report approved by: June Joseph 2018 01:16 PM            Assessment and Plan:     53 year old male with a history of hypertension and recent admission for afib presents for f/u. Had a FRANCISCO JAVIER/Cardioversion which resulted in conversion to NSR.  Lab work unremarkable, trop negative x1. Echo EF 55-60%, otherwise I just need to work with normal. [id number] - 1 (HTN). He was started on 5 mg PO Apixaban BID and will remain on anticoagulation for 3 months, and continued Metoprolol 50 mg PO BID. His Ziopatch showed no sustained afib - really only showed 3 runs of NSVT that did not correlate to patient's symptoms of palpitations (very short runs).    Patient is doing well after his discharge.  His beta-blocker may be slowing him down and giving him nonspecific symptoms (like his needle and pin sensation, or slightly increased fatigue).  We will switch him from metoprolol to amlodipine 5 mg daily at this  time.  It is clear that he needs some sort of antihypertensive medication given that he has had persistent high blood pressure readings over the last several months.  Of note, patient needs 2 more months of anticoagulation given he underwent a cardioversion.  After his 3 months of anticoagulation , he no longer needs anticoagulation long-term given his [id number] score is 1.    Patient inquired about exercise and if he can continue to do his high intensity workouts.  We reassured him that he should be exercising as he used to be and should have no activity limitations.  If he develops A. fib because of his exercise we will use that information to guide his therapies in the future.  He does have mild left atrial dilatation which does suggest that over time his risk for atrial fibrillation perhaps is higher, along with his positive family history as well.    Thank you for allowing me to care for this patient. This patient was seen and discussed with Dr. Tr Hendrickson, who agrees with the plan above. If you have any questions, please do not hesitate to contact me.    Thank you,    Umm Dsouza  Cardiology Fellow, PGY-5     I very much appreciated the opportunity to see and assess Mr Manjinder Esparza in the clinic with CV Fellow Dr Dsouza  . I agree with the note above which summarizes my findings and current recommendations. Today I spent approx 25 min with patient reviewing history, ECGs and planned folow-up strategy      Please do not hesitate to contact my office if you have any questions or concerns.      Tr Hendrickson MD  Cardiac Arrhythmia Service  [facility]  [phone]      [address]

## 2018-07-12 NOTE — LETTER
7/12/2018      RE: Manjinder Esparza  [address] [address] [place] [place] [address]       Dear Colleague,    Thank you for the opportunity to participate in the care of your patient, Manjinder Esparza, at the [facility] HEART [facility]. Please see a copy of my visit note below.    HPI:     53 year old male with a history of hypertension and recent admission for afib presents for f/u. Patient reports presenting for afib while exercising on 6/28, as he had an onset of dizziness, shortness of breath, vision changes, and near syncope. Patient was found to be in new afib with rvr on arrival to ED.  Was given PO/IV Metoprolol for rate control.  Had a FRANCISCO JAVIER/Cardioversion which resulted in conversion to NSR.  Lab work unremarkable, trop negative x1. Echo EF 55-60%, otherwise I just need to work with normal. [id number] - 1 (HTN). He was started on 5 mg PO Apixaban BID and will remain on anticoagulation for 3 months, and continued Metoprolol 50 mg PO BID. His Ziopatch showed no sustained afib - really only showed 3 runs of NSVT that did not correlate to patient's symptoms of palpitations (very short runs).    Patient states that he has done well since his discharge.  He has experienced some episodes of anxiety because he is not very concerned about his atrial fibrillation.  After asking around his family it was noted to him that his mother and his older brother both have a diagnosis of atrial fibrillation that they currently deal with.  Patient states that he also has experienced some needles and pins sensation in his fingers at times, although he states that all these symptoms could be related to anxiety as he is experiencing from the atrial fibrillation diagnosis.  Patient states that he continues to take his apixaban and his metoprolol as scheduled.  He inquires about exercise and how much exercise he should be doing.  He is also checking his blood pressure daily and he has found it to be  better controlled on the metoprolol.        PAST MEDICAL HISTORY:  No past medical history on file.    CURRENT MEDICATIONS:  Current Outpatient Prescriptions   Medication Sig Dispense Refill     amLODIPine (NORVASC) 5 MG tablet Take 1 tablet (5 mg) by mouth daily 90 tablet 3     apixaban ANTICOAGULANT (ELIQUIS) 5 MG tablet Take 1 tablet (5 mg) by mouth 2 times daily 60 tablet 0       PAST SURGICAL HISTORY:  Past Surgical History:   Procedure Laterality Date     ANESTHESIA CARDIOVERSION N/A 6/29/2018    Procedure: ANESTHESIA CARDIOVERSION;  Cardioversion;  Surgeon: GENERIC ANESTHESIA PROVIDER;  Location: [facility] OR     SURGICAL HISTORY OF -       animal bone graft in the left lower jaw       ALLERGIES:     Allergies   Allergen Reactions     Lisinopril      cough       FAMILY HISTORY:  Family History   Problem Relation Age of Onset     Diabetes Mother      type 2     Hypertension Mother      Cancer - colorectal Mother      Circulatory Mother      varicose veins     Eye Disorder Mother      cataracts     Hypertension Father      Prostate Cancer Father      Arthritis Father      in his knees     Lipids Father      Eye Disorder Maternal Grandmother      cataracts     Cancer Maternal Grandmother      Cancer Maternal Grandfather      lung     Cerebrovascular Disease Paternal Grandmother      Allergies Other      self, developing allergies      GASTROINTESTINAL DISEASE Other      self, may have ibs     Lipids Brother      Prostate Cancer Brother      - Premature coronary artery disease  - Atrial fibrillation  - Sudden cardiac death     SOCIAL HISTORY:  Social History   Substance Use Topics     Smoking status: Never Smoker     Smokeless tobacco: Never Used     Alcohol use 3.6 - 5.4 oz/week     6 - 9 Cans of beer per week      Comment: 12 drinks a week        ROS:   Constitutional: No fever, chills, or sweats. Weight stable.   ENT: No visual disturbance, ear ache, epistaxis, sore throat.   Cardiovascular: As per HPI.  "  Respiratory: No cough, hemoptysis.    GI: No nausea, vomiting, hematemesis, melena, or hematochezia.   : No hematuria.   Integument: Negative.   Psychiatric: Negative.   Hematologic:  Easy bruising, no easy bleeding.  Neuro: Negative.   Endocrinology: No significant heat or cold intolerance   Musculoskeletal: No myalgia.    Exam:  /85 (BP Location: Left arm, Patient Position: Chair, Cuff Size: Adult Regular)  Pulse 58  Ht 1.791 m (5' 10.5\")  Wt 86 kg (189 lb 11.2 oz)  SpO2 98%  BMI 26.83 kg/m2  GENERAL APPEARANCE: healthy, alert and no distress  HEENT: no icterus, no xanthelasmas, normal pupil size and reaction, normal palate, mucosa moist, no central cyanosis  NECK: no adenopathy, no asymmetry, masses, or scars, thyroid normal to palpation and no bruits, JVP not elevated  RESPIRATORY: lungs clear to auscultation - no rales, rhonchi or wheezes, no use of accessory muscles, no retractions, respirations are unlabored, normal respiratory rate  CARDIOVASCULAR: regular rhythm, normal S1 with physiologic split S2, no S3 or S4 and no murmur, click or rub, precordium quiet with normal PMI.  ABDOMEN: soft, non tender, without hepatosplenomegaly, no masses palpable, bowel sounds normal, aorta not enlarged by palpation, no abdominal bruits  EXTREMITIES: peripheral pulses normal, no edema, no bruits  NEURO: alert and oriented to person/place/time, normal speech, gait and affect  VASC: Radial, femoral, dorsalis pedis and posterior tibialis pulses are normal in volumes and symmetric bilaterally. No bruits are heard.  SKIN: no ecchymoses, no rashes    Labs:  CBC RESULTS:   Lab Results   Component Value Date    WBC 8.3 06/28/2018    RBC 5.08 06/28/2018    HGB 15.9 06/28/2018    HCT 45.4 06/28/2018    MCV 89 06/28/2018    MCH 31.3 06/28/2018    MCHC 35.0 06/28/2018    RDW 12.6 06/28/2018     06/28/2018       BMP RESULTS:  Lab Results   Component Value Date     06/28/2018    POTASSIUM 4.2 06/29/2018    " CHLORIDE 107 2018    CO2 25 2018    ANIONGAP 9 2018     (H) 2018    BUN 17 2018    CR 1.17 2018    GFRESTIMATED 65 2018    GFRESTBLACK 79 2018    JESUS 8.4 (L) 2018        INR RESULTS:  Lab Results   Component Value Date    INR 1.16 (H) 2018       Procedures:  PULMONARY FUNCTION TESTS:   No flowsheet data found.      ECHOCARDIOGRAM:   Recent Results (from the past 8760 hour(s))   Echocardiogram Complete    Narrative    [id number]  ECH19  [id number]  [id number]^EMERY^BRIGHT^E           [facility],[place]  Echocardiography Laboratory  [address] [id number]     Name: ROCCO CASSIDY  MRN: [id number]  : 1965  Study Date: 2018 12:33 PM  Age: 53 yrs  Gender: Male  Patient Location: [facility]  Reason For Study: Afib  Ordering Physician: BRIGHT LAND  Performed By: Cristobal Jacobs RDCS     BSA: 0.34 m2  Height: 6 in  Weight: 185 lb  BP: 141/109 mmHg  _____________________________________________________________________________  __        Procedure  Complete Portable Echo Adult. Echocardiogram with two-dimensional, color and  spectral Doppler performed.  _____________________________________________________________________________  __        Interpretation Summary  Left ventricular function, chamber size, wall motion, and wall thickness are  normal.The EF is 55-60%.  Normal right ventricular size and systolic function.  Mild left atrial enlargement is present.  No significant valve disease.  _____________________________________________________________________________  __        Left Ventricle  Left ventricular function, chamber size, wall motion, and wall thickness are  normal.The EF is 55-60%. Diastolic function not assessed due to atrial  fibrillation.     Right Ventricle  The right ventricle is normal size. Global right ventricular function is  normal.     Atria  The right atria appears normal. Mild left  atrial enlargement is present.        Mitral Valve  The mitral valve is normal. Trace mitral insufficiency is present.     Aortic Valve  Aortic valve is normal in structure and function. The aortic valve is  tricuspid. No aortic regurgitation is present.     Tricuspid Valve  The tricuspid valve is normal. Trace to mild tricuspid insufficiency is  present. The right ventricular systolic pressure is approximated at 17.2 mmHg  plus the right atrial pressure. Pulmonary artery systolic pressure is normal.     Pulmonic Valve  The pulmonic valve is normal.     Vessels  The aorta root is normal. The inferior vena cava is normal. Estimated mean  right atrial pressure is 3 mmHg (normal).     Pericardium  No pericardial effusion is present.     _____________________________________________________________________________  __  MMode/2D Measurements & Calculations  RVDd: 3.6 cm  IVSd: 0.97 cm  LVIDd: 5.1 cm  LVIDs: 3.7 cm  LVPWd: 0.79 cm     FS: 28.3 %  LV mass(C)d: 159.8 grams  LV mass(C)dI: 469.9 grams/m2  asc Aorta Diam: 3.6 cm  LVOT diam: 2.2 cm  LVOT area: 4.0 cm2  LA Volume (BP): 81.7 ml  RWT: 0.31  TAPSE: 2.0 cm        Doppler Measurements & Calculations  TV max P.2 mmHg  TR max noe: 207.3 cm/sec  TR max P.2 mmHg        _____________________________________________________________________________  __        Report approved by: June Joseph 2018 01:16 PM            Assessment and Plan:     53 year old male with a history of hypertension and recent admission for afib presents for f/u. Had a FRANCISCO JAVIER/Cardioversion which resulted in conversion to NSR.  Lab work unremarkable, trop negative x1. Echo EF 55-60%, otherwise I just need to work with normal. [id number] - 1 (HTN). He was started on 5 mg PO Apixaban BID and will remain on anticoagulation for 3 months, and continued Metoprolol 50 mg PO BID. His Ziopatch showed no sustained afib - really only showed 3 runs of NSVT that did not correlate to patient's  symptoms of palpitations (very short runs).    Patient is doing well after his discharge.  His beta-blocker may be slowing him down and giving him nonspecific symptoms (like his needle and pin sensation, or slightly increased fatigue).  We will switch him from metoprolol to amlodipine 5 mg daily at this time.  It is clear that he needs some sort of antihypertensive medication given that he has had persistent high blood pressure readings over the last several months.  Of note, patient needs 2 more months of anticoagulation given he underwent a cardioversion.  After his 3 months of anticoagulation , he no longer needs anticoagulation long-term given his [id number] score is 1.    Patient inquired about exercise and if he can continue to do his high intensity workouts.  We reassured him that he should be exercising as he used to be and should have no activity limitations.  If he develops A. fib because of his exercise we will use that information to guide his therapies in the future.  He does have mild left atrial dilatation which does suggest that over time his risk for atrial fibrillation perhaps is higher, along with his positive family history as well.    Thank you for allowing me to care for this patient. This patient was seen and discussed with Dr. Tr Hendrickson, who agrees with the plan above. If you have any questions, please do not hesitate to contact me.    Thank you,    Umm Dsouza  Cardiology Fellow, PGY-5     I very much appreciated the opportunity to see and assess Mr Manjinder Esparza in the clinic with CV Fellow Dr Dsouza  . I agree with the note above which summarizes my findings and current recommendations. Today I spent approx 25 min with patient reviewing history, ECGs and planned folow-up strategy      Please do not hesitate to contact my office if you have any questions or concerns.      Tr Hendrickson MD  Cardiac Arrhythmia Service  [facility]  [phone]      HOLLY JEFFREY  [address]

## 2018-07-12 NOTE — PATIENT INSTRUCTIONS
You will be scheduled for a follow up visit: 3 months:  September 6th at 430 pm    New Medications:   norvasc 5 mg once daily  Stop metoprolol      We encourage you to use My Chart as your primary form of communication if possible. If you need assistance in setting this up, please contact our office or ask at your follow up visit.     If you need a medication refill please contact your pharmacy. Please allow at least 3 business days for your refill to be completed.       Cardiology  Telephone Number    Simi Tellez -[phone]   Or send a message to your provider via my chart.   For scheduling procedures:    [facility]    Clinic appointments       [phone]   For the Device Clinic (Pacemakers and ICD's)   RN's :   Larisa Lane  During business hours: [phone]    After business hours:   [phone]- select option 4 and ask for job code [id number].          As always, Thank you for trusting us with your health care needs!

## 2018-07-16 ENCOUNTER — HOSPITAL ENCOUNTER (OUTPATIENT)
Facility: CLINIC | Age: 53
Setting detail: OBSERVATION
Discharge: HOME | End: 2018-07-17
Attending: EMERGENCY MEDICINE | Admitting: INTERNAL MEDICINE
Payer: COMMERCIAL

## 2018-07-16 ENCOUNTER — APPOINTMENT (OUTPATIENT)
Dept: GENERAL RADIOLOGY | Facility: CLINIC | Age: 53
End: 2018-07-16
Attending: EMERGENCY MEDICINE
Payer: COMMERCIAL

## 2018-07-16 DIAGNOSIS — R07.9 CHEST PAIN, UNSPECIFIED TYPE: ICD-10-CM

## 2018-07-16 LAB
ALBUMIN SERPL-MCNC: 4.1 G/DL (ref 3.4–5)
ALP SERPL-CCNC: 55 U/L (ref 40–150)
ALT SERPL W P-5'-P-CCNC: 25 U/L (ref 0–70)
ANION GAP SERPL CALCULATED.3IONS-SCNC: 10 MMOL/L (ref 3–14)
AST SERPL W P-5'-P-CCNC: 24 U/L (ref 0–45)
BASOPHILS # BLD AUTO: 0.1 10E9/L (ref 0–0.2)
BASOPHILS NFR BLD AUTO: 0.5 %
BILIRUB SERPL-MCNC: 0.7 MG/DL (ref 0.2–1.3)
BUN SERPL-MCNC: 19 MG/DL (ref 7–30)
CALCIUM SERPL-MCNC: 8.7 MG/DL (ref 8.5–10.1)
CHLORIDE SERPL-SCNC: 106 MMOL/L (ref 94–109)
CO2 SERPL-SCNC: 25 MMOL/L (ref 20–32)
CREAT SERPL-MCNC: 1.15 MG/DL (ref 0.66–1.25)
DIFFERENTIAL METHOD BLD: NORMAL
EOSINOPHIL # BLD AUTO: 0.1 10E9/L (ref 0–0.7)
EOSINOPHIL NFR BLD AUTO: 1.5 %
ERYTHROCYTE [DISTWIDTH] IN BLOOD BY AUTOMATED COUNT: 12.7 % (ref 10–15)
GFR SERPL CREATININE-BSD FRML MDRD: 67 ML/MIN/1.7M2
GLUCOSE SERPL-MCNC: 113 MG/DL (ref 70–99)
HCT VFR BLD AUTO: 42.4 % (ref 40–53)
HGB BLD-MCNC: 14.7 G/DL (ref 13.3–17.7)
IMM GRANULOCYTES # BLD: 0 10E9/L (ref 0–0.4)
IMM GRANULOCYTES NFR BLD: 0.2 %
INR PPP: 1.18 (ref 0.86–1.14)
INTERPRETATION ECG - MUSE: NORMAL
LYMPHOCYTES # BLD AUTO: 2.2 10E9/L (ref 0.8–5.3)
LYMPHOCYTES NFR BLD AUTO: 22.9 %
MCH RBC QN AUTO: 31.3 PG (ref 26.5–33)
MCHC RBC AUTO-ENTMCNC: 34.7 G/DL (ref 31.5–36.5)
MCV RBC AUTO: 90 FL (ref 78–100)
MONOCYTES # BLD AUTO: 0.8 10E9/L (ref 0–1.3)
MONOCYTES NFR BLD AUTO: 8.8 %
NEUTROPHILS # BLD AUTO: 6.3 10E9/L (ref 1.6–8.3)
NEUTROPHILS NFR BLD AUTO: 66.1 %
NRBC # BLD AUTO: 0 10*3/UL
NRBC BLD AUTO-RTO: 0 /100
PLATELET # BLD AUTO: 205 10E9/L (ref 150–450)
POTASSIUM SERPL-SCNC: 3.9 MMOL/L (ref 3.4–5.3)
PROT SERPL-MCNC: 7.7 G/DL (ref 6.8–8.8)
RBC # BLD AUTO: 4.69 10E12/L (ref 4.4–5.9)
SODIUM SERPL-SCNC: 141 MMOL/L (ref 133–144)
TROPONIN I SERPL-MCNC: <0.015 UG/L (ref 0–0.04)
TROPONIN I SERPL-MCNC: <0.015 UG/L (ref 0–0.04)
WBC # BLD AUTO: 9.5 10E9/L (ref 4–11)

## 2018-07-16 PROCEDURE — 80053 COMPREHEN METABOLIC PANEL: CPT | Performed by: EMERGENCY MEDICINE

## 2018-07-16 PROCEDURE — 93005 ELECTROCARDIOGRAM TRACING: CPT

## 2018-07-16 PROCEDURE — 99220 ZZC INITIAL OBSERVATION CARE,LEVL III: CPT | Performed by: INTERNAL MEDICINE

## 2018-07-16 PROCEDURE — G0378 HOSPITAL OBSERVATION PER HR: HCPCS

## 2018-07-16 PROCEDURE — 25000132 ZZH RX MED GY IP 250 OP 250 PS 637: Performed by: INTERNAL MEDICINE

## 2018-07-16 PROCEDURE — 85025 COMPLETE CBC W/AUTO DIFF WBC: CPT | Performed by: EMERGENCY MEDICINE

## 2018-07-16 PROCEDURE — 85610 PROTHROMBIN TIME: CPT | Performed by: EMERGENCY MEDICINE

## 2018-07-16 PROCEDURE — 36415 COLL VENOUS BLD VENIPUNCTURE: CPT | Performed by: INTERNAL MEDICINE

## 2018-07-16 PROCEDURE — 84484 ASSAY OF TROPONIN QUANT: CPT | Performed by: INTERNAL MEDICINE

## 2018-07-16 PROCEDURE — 71046 X-RAY EXAM CHEST 2 VIEWS: CPT

## 2018-07-16 PROCEDURE — 84484 ASSAY OF TROPONIN QUANT: CPT | Performed by: EMERGENCY MEDICINE

## 2018-07-16 PROCEDURE — 99285 EMERGENCY DEPT VISIT HI MDM: CPT | Mod: 25

## 2018-07-16 RX ORDER — NITROGLYCERIN 0.4 MG/1
0.4 TABLET SUBLINGUAL EVERY 5 MIN PRN
Status: DISCONTINUED | OUTPATIENT
Start: 2018-07-16 | End: 2018-07-17 | Stop reason: HOSPADM

## 2018-07-16 RX ORDER — AMLODIPINE BESYLATE 5 MG/1
5 TABLET ORAL DAILY
Status: DISCONTINUED | OUTPATIENT
Start: 2018-07-17 | End: 2018-07-17 | Stop reason: HOSPADM

## 2018-07-16 RX ORDER — HYDROCODONE BITARTRATE AND ACETAMINOPHEN 5; 325 MG/1; MG/1
1-2 TABLET ORAL EVERY 4 HOURS PRN
Status: DISCONTINUED | OUTPATIENT
Start: 2018-07-16 | End: 2018-07-17 | Stop reason: HOSPADM

## 2018-07-16 RX ORDER — ACETAMINOPHEN 325 MG/1
650 TABLET ORAL EVERY 4 HOURS PRN
Status: DISCONTINUED | OUTPATIENT
Start: 2018-07-16 | End: 2018-07-17 | Stop reason: HOSPADM

## 2018-07-16 RX ORDER — ASPIRIN 81 MG/1
81 TABLET ORAL DAILY
Status: DISCONTINUED | OUTPATIENT
Start: 2018-07-17 | End: 2018-07-17 | Stop reason: HOSPADM

## 2018-07-16 RX ORDER — ACETAMINOPHEN 650 MG/1
650 SUPPOSITORY RECTAL EVERY 4 HOURS PRN
Status: DISCONTINUED | OUTPATIENT
Start: 2018-07-16 | End: 2018-07-17 | Stop reason: HOSPADM

## 2018-07-16 RX ORDER — MORPHINE SULFATE 2 MG/ML
2 INJECTION, SOLUTION INTRAMUSCULAR; INTRAVENOUS
Status: DISCONTINUED | OUTPATIENT
Start: 2018-07-16 | End: 2018-07-17 | Stop reason: HOSPADM

## 2018-07-16 RX ORDER — LIDOCAINE 40 MG/G
CREAM TOPICAL
Status: DISCONTINUED | OUTPATIENT
Start: 2018-07-16 | End: 2018-07-17 | Stop reason: HOSPADM

## 2018-07-16 RX ORDER — ALUMINA, MAGNESIA, AND SIMETHICONE 2400; 2400; 240 MG/30ML; MG/30ML; MG/30ML
30 SUSPENSION ORAL EVERY 4 HOURS PRN
Status: DISCONTINUED | OUTPATIENT
Start: 2018-07-16 | End: 2018-07-17 | Stop reason: HOSPADM

## 2018-07-16 RX ORDER — ASPIRIN 81 MG/1
162 TABLET, CHEWABLE ORAL ONCE
Status: COMPLETED | OUTPATIENT
Start: 2018-07-16 | End: 2018-07-16

## 2018-07-16 RX ORDER — NALOXONE HYDROCHLORIDE 0.4 MG/ML
.1-.4 INJECTION, SOLUTION INTRAMUSCULAR; INTRAVENOUS; SUBCUTANEOUS
Status: DISCONTINUED | OUTPATIENT
Start: 2018-07-16 | End: 2018-07-17 | Stop reason: HOSPADM

## 2018-07-16 RX ADMIN — ASPIRIN 81 MG 162 MG: 81 TABLET ORAL at 21:06

## 2018-07-16 RX ADMIN — APIXABAN 5 MG: 5 TABLET, FILM COATED ORAL at 21:06

## 2018-07-16 ASSESSMENT — ENCOUNTER SYMPTOMS
SHORTNESS OF BREATH: 1
COUGH: 0
FEVER: 0
NAUSEA: 1
ABDOMINAL PAIN: 1
HEADACHES: 1

## 2018-07-16 NOTE — IP AVS SNAPSHOT
MRN:[id number]                      After Visit Summary   7/16/2018    Manjinder Esparza    MRN: [id number]           Thank you!     Thank you for choosing [facility] for your care. Our goal is always to provide you with excellent care. Hearing back from our patients is one way we can continue to improve our services. Please take a few minutes to complete the written survey that you may receive in the mail after you visit with us. Thank you!        Patient Information     Date Of Birth          1965        About your hospital stay     You were admitted on:  July 16, 2018 [facility]    You were discharged on:  July 17, 2018        Reason for your hospital stay       Observation hospitalization for evaluation of chest pain.  Your CT coronary angiogram is negative for any evidence of stenosis or plaque.                  Who to Call     For medical emergencies, please call 911.  For non-urgent questions about your medical care, please call your primary care provider or clinic, [phone]          Attending Provider     Provider Specialty    Fermin Vera MD Emergency Medicine    [facility], Alvaro Boston DO Internal Medicine       Primary Care Provider Office Phone # Fax #    Honey Gaspar [phone]      After Care Instructions     Activity       Your activity upon discharge: activity as tolerated            Diet       Follow this diet upon discharge: Regular                  Follow-up Appointments     Follow-up and recommended labs and tests        Follow up with primary care provider, Honey Gaspar, within 1 week for hospital follow- up and for continued workup of non-cardiac causes of chest pain.                  Your next 10 appointments already scheduled     Jul 19, 2018 11:20 AM TERESA Brooks Physical Adult with JOHNNY Kirkland CNP   [facility] ([facility]     [address] [address]   [phone]            Jul 19, 2018 12:30 PM CDT   (Arrive by 12:15 PM)   Return Visit with JOHNNY Santoyo CNP   [facility] ([facility])    [address] [place] [address]  [address] [place] [address] [phone]            Sep 06, 2018  4:30 PM CDT   (Arrive by 4:15 PM)   RETURN ATRIAL FIBULATION VISIT with Tr Hendrickson MD   [facility] ([facility])    [address] [place] [address]  [address] [place] [id number] [phone]              Further instructions from your care team       Notes Recorded by Delta Ortiz MD on 7/17/2018 at 1:57 PM  Continue with metoprolol PRN as already take metoprolol 25 mg in case of palpitations - write down how many metoprolol tablets PRN you take per week and let us know    Thanks    DD             Pending Results     No orders found for last 3 day(s).            Statement of Approval     Ordered          07/17/18 1521  I have reviewed and agree with all the recommendations and orders detailed in this document.  EFFECTIVE NOW     Approved and electronically signed by:  Tomy Blunt PA             Admission Information     Date & Time Provider Department Dept. Phone    7/16/2018 Alvaro Negro DO [facility] [phone]      Your Vitals Were     Blood Pressure Pulse Temperature Respirations Height Weight    120/69 79 98.4  F (36.9  C) (Oral) 16 1.829 m (6') 82 kg (180 lb 11.2 oz)    Pulse Oximetry BMI (Body Mass Index)                99% 24.51 kg/m2          MyChart Information     [employer] gives you secure access to your electronic health record. If you see a primary care provider, you can also send messages to your care team and make appointments. If you have questions, please call your primary care clinic.  If you do not have a primary care provider, please call [phone] and they will assist you.         Care EveryWhere ID     This is your Care EveryWhere ID. This could be used by other organizations to access your [facility] medical records  [id number]        Equal Access to Services     AMY ASTUDILLO : Celine Keenan, bailee mckeonfilemonha, jaylonsuleiman quevedoraheem olmedo, tea ochoaranjeet guera. So [facility] [phone].    ATENCIÓN: Si habla español, tiene a hastings disposición servicios gratuitos de asistencia lingüística. Llame al [phone].    We comply with applicable federal civil rights laws and [place] laws. We do not discriminate on the basis of race, color, national origin, age, disability, sex, sexual orientation, or gender identity.               Review of your medicines      CONTINUE these medicines which have NOT CHANGED        Dose / Directions    amLODIPine 5 MG tablet   Commonly known as:  NORVASC   Used for:  Hypertension, unspecified type        Dose:  5 mg   Take 1 tablet (5 mg) by mouth daily   Quantity:  90 tablet   Refills:  3       apixaban ANTICOAGULANT 5 MG tablet   Commonly known as:  ELIQUIS   Used for:  New onset a-fib (H)        Dose:  5 mg   Take 1 tablet (5 mg) by mouth 2 times daily   Quantity:  60 tablet   Refills:  0                Protect others around you: Learn how to safely use, store and throw away your medicines at www.disposemymeds.org.             Medication List: This is a list of all your medications and when to take them. Check marks below indicate your daily home schedule. Keep this list as a reference.      Medications           Morning Afternoon Evening Bedtime As Needed    amLODIPine 5 MG tablet   Commonly known as:  NORVASC   Take 1 tablet (5 mg) by mouth daily                                   apixaban ANTICOAGULANT 5 MG tablet   Commonly known as:  ELIQUIS   Take 1 tablet (5 mg) by mouth 2 times daily   Last time this was given:  5 mg on 7/17/2018  8:03 AM

## 2018-07-16 NOTE — IP AVS SNAPSHOT
[facility]    [address] [place] [id number]    Phone:  [phone]                                       After Visit Summary   7/16/2018    Manjinder Esparza    MRN: [id number]           After Visit Summary Signature Page     I have received my discharge instructions, and my questions have been answered. I have discussed any challenges I see with this plan with the nurse or doctor.    ..........................................................................................................................................  Patient/Patient Representative Signature      ..........................................................................................................................................  Patient Representative Print Name and Relationship to Patient    ..................................................               ................................................  Date                                            Time    ..........................................................................................................................................  Reviewed by Signature/Title    ...................................................              ..............................................  Date                                                            Time

## 2018-07-16 NOTE — ED PROVIDER NOTES
History     Chief Complaint:    Chest Pain       HPI   Manjinder Esparza is a 53 year old male with a history of hypertension and recent cardioversion for atrial fibrillation who presents to the ED today for evaluation of chest pain. The patient states that last night, he began to have some chest pain. He notes that it got better this morning, so he went to work, but then became concerned when the chest pain worsened throughout the day. He describes the chest pain as a burning ache that has radiated to the left side of his jaw and his left arm as well. Here in the ED, the patient states that he has a headache, but that his chest pain has subsided. He reports to having some shortness of breath, upper abdominal pain, and nausea, but denies any fever or cough. The patient has not taken any medication for his chest pain.       Allergies:  Lisinopril      Medications:    Norvasc   Eliquis      Past Medical History:    Hypertension     Past Surgical History:    Anesthesia cardioversion   Bone graft    Family History:    Diabetes   Hypertension   Cancer   Varicose veins   Arthritis   Lipids     Social History:  Marital Status: single   Presents to the ED alone   Tobacco Use: never smoker   Alcohol Use: yes  PCP: Honey Gaspar     Review of Systems   Constitutional: Negative for fever.   Respiratory: Positive for shortness of breath. Negative for cough.    Cardiovascular: Positive for chest pain.   Gastrointestinal: Positive for abdominal pain and nausea.   Neurological: Positive for headaches.   All other systems reviewed and are negative.      Physical Exam   First Vitals:  BP: 132/82  Pulse: 79  Temp: 98.4  F (36.9  C)  Resp: 16  Height: 182.9 cm (6')  Weight: 86.2 kg (190 lb)  SpO2: 99 %      Physical Exam  Nursing note and vitals reviewed.  Constitutional:  Oriented to person, place, and time. Cooperative.   HENT:   Nose:    Nose normal.   Mouth/Throat:   Mucous membranes are normal.   Eyes:    Conjunctivae  normal and EOM are normal.      Pupils are equal, round, and reactive to light.   Neck:    Trachea normal.   Cardiovascular:  Normal rate, regular rhythm, normal heart sounds and normal pulses. No murmur heard.  Pulmonary/Chest:  Effort normal and breath sounds normal.   Abdominal:   Soft. Normal appearance and bowel sounds are normal.      There is no tenderness.      There is no rebound and no CVA tenderness.   Musculoskeletal:  Extremities atraumatic x 4.   Lymphadenopathy:  No cervical adenopathy.   Neurological:   Alert and oriented to person, place, and time. Normal strength.      No cranial nerve deficit or sensory deficit. GCS eye subscore is 4. GCS verbal subscore is 5. GCS motor subscore is 6.   Skin:    Skin is intact. No rash noted.   Psychiatric:   Normal mood and affect.     Emergency Department Course   ECG:  @ 1714  Indication: chest pain   Vent. Rate 67 bpm. SD interval 192 ms. QRS duration 86 ms. QT/QTc 396/418 ms. P-R-T axis 54 14 28.   Normal sinus rhythm. Anterior infarct, age undetermined. Abnormal ECG.  No significant change when compared to previous ECG from 7/10/18   Read @ 1735 by Dr. Vera.     Imaging:  Xray chest, 2 views   Negative.  Preliminary radiology read.     Radiographic findings were communicated with the patient who voiced understanding of the findings.    Laboratory:  INR: 1.18 (H)   CMP: glucose 113 (H), otherwise WNL (Creatinine 1.15)   Troponin I: <0.015   CBC:  WBC 9.5, HGB 14.7, , otherwise WNL     Emergency Department Course:  Nursing notes and vitals reviewed.  (8784) I performed an exam of the patient as documented above.    EKG was done, interpretation as above.   A peripheral IV was established. Blood was drawn from the patient. This was sent for laboratory testing, findings above.    The patient was sent for a chest xray while in the emergency department, findings above.    (1908) I rechecked on the patient and updated them on results.    Findings and  plan explained to the patient who consents to admission.   (1917) I discussed the patient with Dr. Negro of the hospitalist service, who will admit the patient to an observation bed for further monitoring, evaluation, and treatment.   Impression & Plan    Medical Decision Making:  Manjinder Esaprza is a 53 year old male who presents with intermittent chest pain with some radiation into his neck and arm since last night.  He was essentially pain-free upon arrival here though, and his EKG and troponin are both normal.  In fact, his entire workup is normal.  However his symptoms are somewhat concerning for cardiac etiologies.  I discussed how we should proceed including going home with an outpatient stress test versus being admitted to the hospital for a rule out protocol and stress testing or CT coronary angiogram.  I think it is best that he be brought into the hospital for further evaluation and management.  I then spoke with Dr. Negro, who will be taking care of the patient.      Diagnosis:    ICD-10-CM    1. Chest pain, unspecified type R07.9        Disposition:  Admitted to hospitalist.     I, Linda Dee, am serving as a scribe on 7/16/2018 at 5:44 PM to personally document services performed by Dr. Vera based on my observations and the provider's statements to me.   7/16/2018    EMERGENCY DEPARTMENT       Fermin Vera MD  07/16/18 1322

## 2018-07-17 ENCOUNTER — APPOINTMENT (OUTPATIENT)
Dept: CARDIOLOGY | Facility: CLINIC | Age: 53
End: 2018-07-17
Attending: INTERNAL MEDICINE
Payer: COMMERCIAL

## 2018-07-17 VITALS
SYSTOLIC BLOOD PRESSURE: 120 MMHG | DIASTOLIC BLOOD PRESSURE: 69 MMHG | HEIGHT: 72 IN | WEIGHT: 180.7 LBS | TEMPERATURE: 98.4 F | OXYGEN SATURATION: 99 % | HEART RATE: 79 BPM | RESPIRATION RATE: 16 BRPM | BODY MASS INDEX: 24.47 KG/M2

## 2018-07-17 LAB
CREAT SERPL-MCNC: 1.15 MG/DL (ref 0.66–1.25)
CREAT SERPL-MCNC: 1.18 MG/DL (ref 0.66–1.25)
GFR SERPL CREATININE-BSD FRML MDRD: 65 ML/MIN/1.7M2
GFR SERPL CREATININE-BSD FRML MDRD: 67 ML/MIN/1.7M2
TROPONIN I SERPL-MCNC: <0.015 UG/L (ref 0–0.04)

## 2018-07-17 PROCEDURE — 99217 ZZC OBSERVATION CARE DISCHARGE: CPT | Performed by: PHYSICIAN ASSISTANT

## 2018-07-17 PROCEDURE — 25000125 ZZHC RX 250: Performed by: INTERNAL MEDICINE

## 2018-07-17 PROCEDURE — 75574 CT ANGIO HRT W/3D IMAGE: CPT

## 2018-07-17 PROCEDURE — 36415 COLL VENOUS BLD VENIPUNCTURE: CPT | Performed by: INTERNAL MEDICINE

## 2018-07-17 PROCEDURE — 82565 ASSAY OF CREATININE: CPT | Performed by: INTERNAL MEDICINE

## 2018-07-17 PROCEDURE — 75574 CT ANGIO HRT W/3D IMAGE: CPT | Mod: 26 | Performed by: INTERNAL MEDICINE

## 2018-07-17 PROCEDURE — 84484 ASSAY OF TROPONIN QUANT: CPT | Performed by: INTERNAL MEDICINE

## 2018-07-17 PROCEDURE — 25000128 H RX IP 250 OP 636: Performed by: INTERNAL MEDICINE

## 2018-07-17 PROCEDURE — 25000132 ZZH RX MED GY IP 250 OP 250 PS 637: Performed by: INTERNAL MEDICINE

## 2018-07-17 PROCEDURE — G0378 HOSPITAL OBSERVATION PER HR: HCPCS

## 2018-07-17 RX ORDER — IOPAMIDOL 755 MG/ML
500 INJECTION, SOLUTION INTRAVASCULAR ONCE
Status: COMPLETED | OUTPATIENT
Start: 2018-07-17 | End: 2018-07-17

## 2018-07-17 RX ORDER — METOPROLOL TARTRATE 50 MG
50-100 TABLET ORAL
Status: DISCONTINUED | OUTPATIENT
Start: 2018-07-17 | End: 2018-07-17 | Stop reason: HOSPADM

## 2018-07-17 RX ORDER — METOPROLOL TARTRATE 1 MG/ML
5-15 INJECTION, SOLUTION INTRAVENOUS
Status: DISCONTINUED | OUTPATIENT
Start: 2018-07-17 | End: 2018-07-17 | Stop reason: HOSPADM

## 2018-07-17 RX ORDER — METHYLPREDNISOLONE SODIUM SUCCINATE 125 MG/2ML
125 INJECTION, POWDER, LYOPHILIZED, FOR SOLUTION INTRAMUSCULAR; INTRAVENOUS
Status: DISCONTINUED | OUTPATIENT
Start: 2018-07-17 | End: 2018-07-17 | Stop reason: HOSPADM

## 2018-07-17 RX ORDER — ACYCLOVIR 200 MG/1
0-1 CAPSULE ORAL
Status: DISCONTINUED | OUTPATIENT
Start: 2018-07-17 | End: 2018-07-17 | Stop reason: HOSPADM

## 2018-07-17 RX ORDER — ONDANSETRON 2 MG/ML
4 INJECTION INTRAMUSCULAR; INTRAVENOUS
Status: DISCONTINUED | OUTPATIENT
Start: 2018-07-17 | End: 2018-07-17 | Stop reason: HOSPADM

## 2018-07-17 RX ORDER — NITROGLYCERIN 0.4 MG/1
0.4 TABLET SUBLINGUAL
Status: DISCONTINUED | OUTPATIENT
Start: 2018-07-17 | End: 2018-07-17 | Stop reason: HOSPADM

## 2018-07-17 RX ORDER — DIPHENHYDRAMINE HCL 25 MG
25 CAPSULE ORAL
Status: DISCONTINUED | OUTPATIENT
Start: 2018-07-17 | End: 2018-07-17 | Stop reason: HOSPADM

## 2018-07-17 RX ORDER — DIPHENHYDRAMINE HYDROCHLORIDE 50 MG/ML
25-50 INJECTION INTRAMUSCULAR; INTRAVENOUS
Status: DISCONTINUED | OUTPATIENT
Start: 2018-07-17 | End: 2018-07-17 | Stop reason: HOSPADM

## 2018-07-17 RX ADMIN — ASPIRIN 81 MG: 81 TABLET, COATED ORAL at 08:03

## 2018-07-17 RX ADMIN — APIXABAN 5 MG: 5 TABLET, FILM COATED ORAL at 08:03

## 2018-07-17 RX ADMIN — IOPAMIDOL 120 ML: 755 INJECTION, SOLUTION INTRAVENOUS at 09:34

## 2018-07-17 RX ADMIN — METOPROLOL TARTRATE 5 MG: 5 INJECTION, SOLUTION INTRAVENOUS at 09:28

## 2018-07-17 RX ADMIN — NITROGLYCERIN 0.4 MG: 0.4 TABLET SUBLINGUAL at 09:23

## 2018-07-17 RX ADMIN — SODIUM CHLORIDE 100 ML: 9 INJECTION, SOLUTION INTRAVENOUS at 09:34

## 2018-07-17 NOTE — ED NOTES
[facility]  ED Nurse Handoff Report    ED Chief complaint: Chest Pain (started last night with nausea with radiation to L arm and L jaw. was admitted few weeks ago for new onset afib. )      ED Diagnosis:   Final diagnoses:   Chest pain, unspecified type       Code Status: Full Code    Allergies:   Allergies   Allergen Reactions     Lisinopril      cough       Activity level - Baseline/Home:  Independent    Activity Level - Current:   Independent     Needed?: No    Isolation: No  Infection: Not Applicable  Bariatric?: No    Vital Signs:   Vitals:    07/16/18 1720 07/16/18 1830 07/16/18 1900   BP: 132/82 121/82 125/84   Pulse: 79     Resp: 16 25 13   Temp: 98.4  F (36.9  C)     TempSrc: Temporal     SpO2: 99% 95% 100%   Weight: 86.2 kg (190 lb)     Height: 1.829 m (6')         Cardiac Rhythm: ,        Pain level: 0-10 Pain Scale: 3    Is this patient confused?: No   [place] - Suicide Severity Rating Scale Completed?  Yes  If yes, what color did the patient score?  White    Patient Report: Initial Complaint: The patient states that last night, he began to have some chest pain. He notes that it got better this morning, so he went to work, but then became concerned when the chest pain worsened throughout the day. He describes the chest pain as a burning ache that has radiated to the left side of his jaw and his left arm as well. Pt. Denies CP at this time, now still having slight headache.  Focused Assessment: Pt. Alert and orient. Times 3, denies CP, slight SOB feeling with concurrent headache. Stable gait with ambulation.  Tests Performed: Labs and CXR  Abnormal Results:   Results for orders placed or performed during the hospital encounter of 07/16/18 (from the past 24 hour(s))   CBC with platelets differential   Result Value Ref Range    WBC 9.5 4.0 - 11.0 10e9/L    RBC Count 4.69 4.4 - 5.9 10e12/L    Hemoglobin 14.7 13.3 - 17.7 g/dL    Hematocrit 42.4 40.0 - 53.0 %    MCV 90 78 - 100 fl     MCH 31.3 26.5 - 33.0 pg    MCHC 34.7 31.5 - 36.5 g/dL    RDW 12.7 10.0 - 15.0 %    Platelet Count 205 150 - 450 10e9/L    Diff Method Automated Method     % Neutrophils 66.1 %    % Lymphocytes 22.9 %    % Monocytes 8.8 %    % Eosinophils 1.5 %    % Basophils 0.5 %    % Immature Granulocytes 0.2 %    Nucleated RBCs 0 0 /100    Absolute Neutrophil 6.3 1.6 - 8.3 10e9/L    Absolute Lymphocytes 2.2 0.8 - 5.3 10e9/L    Absolute Monocytes 0.8 0.0 - 1.3 10e9/L    Absolute Eosinophils 0.1 0.0 - 0.7 10e9/L    Absolute Basophils 0.1 0.0 - 0.2 10e9/L    Abs Immature Granulocytes 0.0 0 - 0.4 10e9/L    Absolute Nucleated RBC 0.0    INR   Result Value Ref Range    INR 1.18 (H) 0.86 - 1.14   Comprehensive metabolic panel   Result Value Ref Range    Sodium 141 133 - 144 mmol/L    Potassium 3.9 3.4 - 5.3 mmol/L    Chloride 106 94 - 109 mmol/L    Carbon Dioxide 25 20 - 32 mmol/L    Anion Gap 10 3 - 14 mmol/L    Glucose 113 (H) 70 - 99 mg/dL    Urea Nitrogen 19 7 - 30 mg/dL    Creatinine 1.15 0.66 - 1.25 mg/dL    GFR Estimate 67 >60 mL/min/1.7m2    GFR Estimate If Black 81 >60 mL/min/1.7m2    Calcium 8.7 8.5 - 10.1 mg/dL    Bilirubin Total 0.7 0.2 - 1.3 mg/dL    Albumin 4.1 3.4 - 5.0 g/dL    Protein Total 7.7 6.8 - 8.8 g/dL    Alkaline Phosphatase 55 40 - 150 U/L    ALT 25 0 - 70 U/L    AST 24 0 - 45 U/L   Troponin I   Result Value Ref Range    Troponin I ES <0.015 0.000 - 0.045 ug/L   XR Chest 2 Views    Narrative    CHEST TWO VIEWS      7/16/2018 5:38 PM     HISTORY: Chest pain.    COMPARISON: 6/20/2018.    FINDINGS: Heart and lungs negative. No interval change.      Impression    IMPRESSION: Negative.     NASIM SAM MD     Treatments provided: none    Family Comments: Wife with pt.     OBS brochure/video discussed/provided to patient: Yes    ED Medications: Medications - No data to display    Drips infusing?:  No    For the majority of the shift this patient was Green.   Interventions performed were None.    Severe Sepsis  OR Septic Shock Diagnosis Present: No      ED NURSE PHONE NUMBER: [phone]       RECEIVING UNIT ED HANDOFF REVIEW    ED Nurse Handoff Report was reviewed by: William Serrano on July 16, 2018 at 8:00 PM

## 2018-07-17 NOTE — H&P
Admitted:     07/16/2018      PRIMARY CARE PHYSICIAN:  Dr. Honey Gaspar, NP.        CODE STATUS:  Full code.      CHIEF COMPLAINT:  Chest pain.      HISTORY OF PRESENT ILLNESS:  Mr. Esparza is a 53-year-old male with a past medical history significant for hypertension and atrial fibrillation with recent cardioversion, who presents to the Emergency Department with the above concerns.  History is obtained through discussion with the ED physician, the patient and his girlfriend at bedside.  The patient notes that last evening he started having some chest discomfort mostly in the left side part of the chest which would get better.  It would come and go, but he was able to go to sleep without any significant issues.  He woke up this morning feeling fairly well, but as the day went on, the pain seemed to be more persistent and occasionally related to exertion, though would also come on when he was just sitting.  He describes the pain as across his chest during the day time today with radiation into the left shoulder and somewhat into the left jaw.  He was nauseated intermittently and also felt a bit nauseated yesterday prior to getting chest discomfort.  He did not have specific shortness of breath but noted that he felt a bit winded when he was talking, which is atypical for him.  No fevers or chills.  No recent infectious issues.      The patient does have a recent diagnosis of atrial fibrillation and underwent cardioversion on 06/29/2018.  He was placed on apixaban and was just seen by Cardiology 3 days ago where he had metoprolol changed to amlodipine because of some issues with fatigue.  He just started taking the amlodipine this morning.      In the Emergency Department, the patient had initial negative troponin and basic labs, all of which were unremarkable.  Chest x-ray was also negative.  EKG showed normal sinus rhythm with what appears to be potentially a Q-wave in V1 and V2 and is unchanged from his prior.   When I saw the patient in the ED, he was feeling nearly back to his baseline.  He has not had any chest pain so far in the Emergency Department.      The patient previously before his atrial fibrillation has been fairly active and would exercise without any symptoms of chest discomfort or shortness of breath.  He did go for a bike ride last week and did not have any symptoms.  There is potentially some association with eating with this chest discomfort, but not all the time.      PAST MEDICAL HISTORY:   1.  Hypertension.   2.  Atrial fibrillation, status post cardioversion end of June with plans for 3 months of anticoagulation.      MEDICATIONS:   1. Amlodipine 5 mg daily.   2. Apixaban 5 mg b.i.d.      SOCIAL HISTORY:  The patient denies any use of tobacco and drinks multiple beers on the weekends.      FAMILY HISTORY:  Mother had atrial fibrillation, but no known history of coronary artery disease.      ALLERGIES:  LISINOPRIL.      REVIEW OF SYSTEMS:  The complete review of systems reviewed and negative, save for the pertinent positives recorded in HPI.      PHYSICAL EXAMINATION:   VITAL SIGNS:  Show blood pressure of 125/84, heart rate in the 50s-70s, respirations 13, satting 100% on room air with temperature of 98.4 degrees Fahrenheit.   GENERAL:  The patient is lying in bed and appears comfortable.   HEENT:  Pupils equal and reactive to light, extraocular muscle function intact.  No scleral icterus.  Oropharynx is clear.   NECK:  No lymphadenopathy or thyromegaly.   CARDIOVASCULAR:  Heart is regular rate and rhythm without any murmur, rub or gallop.   PULMONARY:  Lungs are clear to auscultation bilaterally without wheeze or rhonchi.   GASTROINTESTINAL:  Positive bowel sounds, soft, nontender and nondistended.   SKIN:  No rash or lesion.   LYMPHATICS:  No peripheral edema.   PSYCHIATRIC:  Alert and oriented x 3, normal affect.   NEUROLOGIC:  Cranial nerves II through XII are grossly intact without any focal  deficits.      LABORATORIES:  CBC, BMP, LFTs and troponin all negative.  I personally reviewed his EKG which shows normal sinus rhythm and a Q-wave in the anterior leads, unchanged from prior.      ASSESSMENT AND PLAN:  Mr. Esparza is a 53-year-old male with a past medical history significant for hypertension and atrial fibrillation, status post cardioversion, currently in sinus rhythm, who presents to the Emergency Department with chest pain, nausea and shortness of breath.   1.  Chest pain:  Currently completely resolved with a negative troponin and an unchanged EKG.  I doubt this is something like a pulmonary embolism, given he is already anticoagulated and there is no evidence of respiratory infection.  The possibility exists of some sort of a gastrointestinal issue such as gastroesophageal reflux disease, but should rule out a cardiac etiology.  Will trend serial troponins and maintain on telemetry.  I am going to order a CT coronary angiogram for the morning which has been discussed with the patient.   2.  Hypertension:  Continue with amlodipine.   3.  Atrial fibrillation, status post cardioversion:  He is currently in sinus rhythm.  Plan to maintain anticoagulation for 3 months per last cardiology note.         WENDY BARBOUR DO             D: 2018   T: 2018   MT: JENNIFER      Name:     ROCCO ESPARZA   MRN:      [id number]        Account:      [id number]   :      1965        Admitted:     2018                   Document: [id number]

## 2018-07-17 NOTE — PLAN OF CARE
Problem: Patient Care Overview  Goal: Plan of Care/Patient Progress Review  List all goals to be met before discharge home:   - Serial troponins and stress test complete - Not Met. 1st & 2nd troponin Negative. Stress Test to be completed.   - Seen and cleared by consultant if applicable - Not Met  - Adequate pain control on oral analgesia - Met. No Pain currently  - Vital signs normal or at patient baseline - VSS  - Safe disposition plan has been identified - Met. Home.   - Nurse to notify provider when observation goals have been met and patient is ready for discharge.

## 2018-07-17 NOTE — PHARMACY-ADMISSION MEDICATION HISTORY
Admission medication history interview status for the 7/16/2018  admission is complete. See EPIC admission navigator for prior to admission medications     Medication history source reliability:Good    Actions taken by pharmacist (provider contacted, etc): Interviewed patient.      Additional medication history information not noted on PTA med list : Patient recently switched from Metoprolol tartrate 50 mg BID to Amlodipine 5 mg daily.    Medication reconciliation/reorder completed by provider prior to medication history? No    Time spent in this activity: 10 minutes    Prior to Admission medications    Medication Sig Last Dose Taking? Auth Provider   amLODIPine (NORVASC) 5 MG tablet Take 1 tablet (5 mg) by mouth daily 7/16/2018 at AM Yes Tr Hendrickson MD   apixaban ANTICOAGULANT (ELIQUIS) 5 MG tablet Take 1 tablet (5 mg) by mouth 2 times daily 7/16/2018 at AM Yes Anne-Marie Rosario, LAURA Evans, ShaunnaD, BCPS

## 2018-07-17 NOTE — DISCHARGE INSTRUCTIONS
Notes Recorded by Delta Ortiz MD on 7/17/2018 at 1:57 PM  Continue with metoprolol PRN as already take metoprolol 25 mg in case of palpitations - write down how many metoprolol tablets PRN you take per week and let us know    Thanks    DD

## 2018-07-17 NOTE — PLAN OF CARE
Problem: Patient Care Overview  Goal: Plan of Care/Patient Progress Review  List all goals to be met before discharge home:    - Serial troponins and stress test complete - partially met, troponins negative x3. Stress test TBD.  - Seen and cleared by consultant if applicable - CT angio appears neg. Reg diet ordered.  - Adequate pain control on oral analgesia - met, denies pain  - Vital signs normal or at patient baseline - VSS  - Safe disposition plan has been identified - Met. Plan to go home.   TONI Spain updated on CT angio results to order regular diet.    - Nurse to notify provider when observation goals have been met and patient is ready for discharge.

## 2018-07-17 NOTE — PLAN OF CARE
Problem: Patient Care Overview  Goal: Plan of Care/Patient Progress Review  List all goals to be met before discharge home:   - Serial troponins and stress test complete - Not Met. 1st & 2nd troponin Negative. Stress Test to be completed.   - Seen and cleared by consultant if applicable - Not Met  - Adequate pain control on oral analgesia - Met. No Pain currently  - Vital signs normal or at patient baseline - VSS  - Safe disposition plan has been identified - Met. Home.   - Nurse to notify provider when observation goals have been met and patient is ready for discharge.    Orientation: A/O x 4  Abnl vital signs/oxygen therapy: RA, VSS  Mobility/ambulation/fall risk: Independent  Pain/treatment: No pain. PRN's  Diet/tolerating: NPO since 3AM  Voiding: Continent  Abnl lab results: NA  Drain/devices: NA  Wounds: NA  Tests/procedures for next shift: Angio  Anticipated DC: Today  Other info/events during shift: NA    Tele: ALONSO

## 2018-07-17 NOTE — PLAN OF CARE
Problem: Patient Care Overview  Goal: Plan of Care/Patient Progress Review  Pt A&Ox4, VSS on RA. Denies pain, denies any chest pain. Up IND. CT angio completed, results WDL. Pt ate dinner. CT angio radiology findings shared with pt. Pt verbalized Tele: Sbrady/SR, HR 50s-70s.  Discharge instructions and medications reviewed with. Pt verbalized understanding. Pt left unit ambulating with backpack to drive himself home as pt stated he drove himself here to hospital.

## 2018-07-17 NOTE — PROGRESS NOTES
Pt scheduled for CTA coronary.  Spoke to pt EZEQUIEL Chan, pt HR in the low 50's.  No need for beta blocker administration.  Will call for pt this am when schedule allows

## 2018-07-17 NOTE — PLAN OF CARE
Problem: Patient Care Overview  Goal: Plan of Care/Patient Progress Review  List all goals to be met before discharge home:    - Serial troponins and stress test complete - partially met, troponins negative x3. Stress test TBD.  - Seen and cleared by consultant if applicable - pending CT angio, plan to occur before 1000h  - Adequate pain control on oral analgesia - met, denies pain  - Vital signs normal or at patient baseline - VSS  - Safe disposition plan has been identified - Met. Plan to go home.     - Nurse to notify provider when observation goals have been met and patient is ready for discharge.

## 2018-07-17 NOTE — PLAN OF CARE
Problem: Patient Care Overview  Goal: Plan of Care/Patient Progress Review  List all goals to be met before discharge home:   - Serial troponins and stress test complete - Not Met. 1st troponin Negative. Stress Test to be completed.   - Seen and cleared by consultant if applicable - Not Met  - Adequate pain control on oral analgesia - Met. No Pain currently  - Vital signs normal or at patient baseline - VSS  - Safe disposition plan has been identified - Met. Home.   - Nurse to notify provider when observation goals have been met and patient is ready for discharge.

## 2018-07-18 ENCOUNTER — TELEPHONE (OUTPATIENT)
Dept: FAMILY MEDICINE | Facility: CLINIC | Age: 53
End: 2018-07-18

## 2018-07-19 ENCOUNTER — OFFICE VISIT (OUTPATIENT)
Dept: FAMILY MEDICINE | Facility: CLINIC | Age: 53
End: 2018-07-19
Payer: COMMERCIAL

## 2018-07-19 VITALS
HEIGHT: 72 IN | TEMPERATURE: 97 F | BODY MASS INDEX: 24.92 KG/M2 | RESPIRATION RATE: 20 BRPM | HEART RATE: 58 BPM | OXYGEN SATURATION: 98 % | DIASTOLIC BLOOD PRESSURE: 89 MMHG | WEIGHT: 184 LBS | SYSTOLIC BLOOD PRESSURE: 135 MMHG

## 2018-07-19 DIAGNOSIS — Z00.00 ROUTINE GENERAL MEDICAL EXAMINATION AT A HEALTH CARE FACILITY: Primary | ICD-10-CM

## 2018-07-19 DIAGNOSIS — I48.91 NEW ONSET A-FIB (H): ICD-10-CM

## 2018-07-19 DIAGNOSIS — Z09 HOSPITAL DISCHARGE FOLLOW-UP: ICD-10-CM

## 2018-07-19 DIAGNOSIS — D17.1 BENIGN LIPOMATOUS NEOPLASM OF SKIN AND SUBCUTANEOUS TISSUE OF TRUNK: ICD-10-CM

## 2018-07-19 DIAGNOSIS — R91.8 PULMONARY NODULES: ICD-10-CM

## 2018-07-19 DIAGNOSIS — Z13.6 CARDIOVASCULAR SCREENING; LDL GOAL LESS THAN 160: ICD-10-CM

## 2018-07-19 DIAGNOSIS — H61.22 IMPACTED CERUMEN OF LEFT EAR: ICD-10-CM

## 2018-07-19 DIAGNOSIS — Z12.5 SCREENING FOR PROSTATE CANCER: ICD-10-CM

## 2018-07-19 LAB
CHOLEST SERPL-MCNC: 142 MG/DL
HDLC SERPL-MCNC: 44 MG/DL
LDLC SERPL CALC-MCNC: 75 MG/DL
NONHDLC SERPL-MCNC: 98 MG/DL
NT-PROBNP SERPL-MCNC: 59 PG/ML (ref 0–125)
PSA SERPL-ACNC: 1.21 UG/L (ref 0–4)
TRIGL SERPL-MCNC: 113 MG/DL

## 2018-07-19 PROCEDURE — 99396 PREV VISIT EST AGE 40-64: CPT | Mod: 25 | Performed by: NURSE PRACTITIONER

## 2018-07-19 PROCEDURE — 99213 OFFICE O/P EST LOW 20 MIN: CPT | Mod: 25 | Performed by: NURSE PRACTITIONER

## 2018-07-19 PROCEDURE — 36415 COLL VENOUS BLD VENIPUNCTURE: CPT | Performed by: NURSE PRACTITIONER

## 2018-07-19 PROCEDURE — G0103 PSA SCREENING: HCPCS | Performed by: NURSE PRACTITIONER

## 2018-07-19 PROCEDURE — 80061 LIPID PANEL: CPT | Performed by: NURSE PRACTITIONER

## 2018-07-19 PROCEDURE — 83880 ASSAY OF NATRIURETIC PEPTIDE: CPT | Performed by: NURSE PRACTITIONER

## 2018-07-19 PROCEDURE — 69210 REMOVE IMPACTED EAR WAX UNI: CPT | Mod: LT | Performed by: NURSE PRACTITIONER

## 2018-07-19 NOTE — MR AVS SNAPSHOT
After Visit Summary   7/19/2018    Manjinder Esparza    MRN: [id number]           Patient Information     Date Of Birth          1965        Visit Information        Provider Department      7/19/2018 11:20 AM Liat Staples APRN [facility]        Today's Diagnoses     Routine general medical examination at a health care facility    -  1    Hospital discharge follow-up        New onset a-fib (H)        Pulmonary nodule, 2mm, RML        CARDIOVASCULAR SCREENING; LDL GOAL LESS THAN 160        Screening for prostate cancer        Benign lipomatous neoplasm of skin and subcutaneous tissue of trunk        Impacted cerumen of left ear          Care Instructions      Preventive Health Recommendations  Male Ages 50 - 64    Yearly exam:             See your health care provider every year in order to  o   Review health changes.   o   Discuss preventive care.    o   Review your medicines if your doctor has prescribed any.     Have a cholesterol test every 5 years, or more frequently if you are at risk for high cholesterol/heart disease.     Have a diabetes test (fasting glucose) every three years. If you are at risk for diabetes, you should have this test more often.     Have a colonoscopy at age 50, or have a yearly FIT test (stool test). These exams will check for colon cancer.      Talk with your health care provider about whether or not a prostate cancer screening test (PSA) is right for you.    You should be tested each year for STDs (sexually transmitted diseases), if you re at risk.     Shots: Get a flu shot each year. Get a tetanus shot every 10 years.     Nutrition:    Eat at least 5 servings of fruits and vegetables daily.     Eat whole-grain bread, whole-wheat pasta and brown rice instead of white grains and rice.     Get adequate Calcium and Vitamin D.     Lifestyle    Exercise for at least 150 minutes a week (30 minutes a day, 5 days a week). This will help you  control your weight and prevent disease.     Limit alcohol to one drink per day.     No smoking.     Wear sunscreen to prevent skin cancer.     See your dentist every six months for an exam and cleaning.     See your eye doctor every 1 to 2 years.            Follow-ups after your visit        Additional Services     GENERAL SURG ADULT REFERRAL       Your provider has referred you to:   [facility]: [facility] [place] [phone]   [email or web address]  [facility]: [facility] [place] [phone]   [email or web address]    Please be aware that coverage of these services is subject to the terms and limitations of your health insurance plan.  Call member services at your health plan with any benefit or coverage questions.      Please bring the following with you to your appointment:    (1) Any X-Rays, CTs or MRIs which have been performed.  Contact the facility where they were done to arrange for  prior to your scheduled appointment.   (2) List of current medications   (3) This referral request   (4) Any documents/labs given to you for this referral                  Your next 10 appointments already scheduled     Sep 06, 2018  4:30 PM CDT   (Arrive by 4:15 PM)   RETURN ATRIAL FIBULATION VISIT with Tr Hendrickson MD   [facility] ([facility])    [address] [place] [address]  [address] [place] [id number] [phone]              Who to contact     If you have questions or need follow up information about today's clinic visit or your schedule please contact [facility] directly at [phone].  Normal or non-critical lab and imaging results will be communicated to you by MyChart, letter or phone within 4 business days after the clinic has received the results. If you do not hear from us within 7 days, please contact the clinic through MyChart or phone. If you  have a critical or abnormal lab result, we will notify you by phone as soon as possible.  Submit refill requests through [employer] or call your pharmacy and they will forward the refill request to us. Please allow 3 business days for your refill to be completed.          Additional Information About Your Visit        [employer] Information     [employer] gives you secure access to your electronic health record. If you see a primary care provider, you can also send messages to your care team and make appointments. If you have questions, please call your primary care clinic.  If you do not have a primary care provider, please call [phone] and they will assist you.        Care EveryWhere ID     This is your Care EveryWhere ID. This could be used by other organizations to access your [place] medical records  [id number]        Your Vitals Were     Pulse Temperature Respirations Height Pulse Oximetry BMI (Body Mass Index)    58 97  F (36.1  C) (Oral) 20 6' (1.829 m) 98% 24.95 kg/m2       Blood Pressure from Last 3 Encounters:   07/19/18 135/89   07/17/18 120/69   07/12/18 134/85    Weight from Last 3 Encounters:   07/19/18 184 lb (83.5 kg)   07/16/18 180 lb 11.2 oz (82 kg)   07/12/18 189 lb 11.2 oz (86 kg)              We Performed the Following     BNP-N terminal pro     GENERAL SURG ADULT REFERRAL     Lipid panel reflex to direct LDL Fasting     OFFICE/OUTPT VISIT,EST,LEVL IV     PSA, screen     REMOVE IMPACTED CERUMEN        Primary Care Provider Office Phone # Fax #    JOHNNY Kirkland [phone] [address] [place] [address]        Equal Access to Services     AMY ASTUDILLO : Hadii irasema huerta Soatul, waaxda luqadaha, qaybta kaalmada tea olmedo. So [facility] [phone].    ATENCIÓN: Si habla español, tiene a hastings disposición servicios gratuitos de asistencia lingüística. Llame al [phone].    We comply with applicable  federal civil rights laws and [place] laws. We do not discriminate on the basis of race, color, national origin, age, disability, sex, sexual orientation, or gender identity.            Thank you!     Thank you for choosing [facility]  for your care. Our goal is always to provide you with excellent care. Hearing back from our patients is one way we can continue to improve our services. Please take a few minutes to complete the written survey that you may receive in the mail after your visit with us. Thank you!             Your Updated Medication List - Protect others around you: Learn how to safely use, store and throw away your medicines at www.disposemymeds.org.          This list is accurate as of 7/19/18  3:48 PM.  Always use your most recent med list.                   Brand Name Dispense Instructions for use Diagnosis    amLODIPine 5 MG tablet    NORVASC    90 tablet    Take 1 tablet (5 mg) by mouth daily    Hypertension, unspecified type       apixaban ANTICOAGULANT 5 MG tablet    ELIQUIS    60 tablet    Take 1 tablet (5 mg) by mouth 2 times daily    New onset a-fib (H)

## 2018-07-19 NOTE — PROGRESS NOTES
SUBJECTIVE:   CC: Manjinder Esparza is an 53 year old male who presents for preventative health visit.   Chief Complaint   Patient presents with     Physical     Hospital F/U       Physical   Annual:     Getting at least 3 servings of Calcium per day:  NO    Bi-annual eye exam:  NO    Dental care twice a year:  Yes    Sleep apnea or symptoms of sleep apnea:  Sleep apnea    Diet:  Low salt    Frequency of exercise:  4-5 days/week    Duration of exercise:  30-45 minutes    Taking medications regularly:  Yes    Medication side effects:  Significant flushing, Lightheadedness and Other    Additional concerns today:  YES        Today's PHQ-2 Score:   PHQ-2 ( 1999 [employer]) 7/19/2018   Q1: Little interest or pleasure in doing things 1   Q2: Feeling down, depressed or hopeless 0   PHQ-2 Score 1   Q1: Little interest or pleasure in doing things Several days   Q2: Feeling down, depressed or hopeless Not at all   PHQ-2 Score 1         Social History   Substance Use Topics     Smoking status: Never Smoker     Smokeless tobacco: Never Used     Alcohol use 3.6 - 5.4 oz/week     6 - 9 Cans of beer per week      Comment: 12 drinks a week      Alcohol Use 7/19/2018   If you drink alcohol do you typically have greater than 3 drinks per day OR greater than 7 drinks per week? Yes   AUDIT SCORE  8       Last PSA:   PSA   Date Value Ref Range Status   10/22/2015 1.45 0 - 4 ug/L Final           Reviewed orders with patient. Reviewed health maintenance and updated orders accordingly - Yes  Labs reviewed in EPIC  BP Readings from Last 3 Encounters:   07/19/18 135/89   07/17/18 120/69   07/12/18 134/85    Wt Readings from Last 3 Encounters:   07/19/18 184 lb (83.5 kg)   07/16/18 180 lb 11.2 oz (82 kg)   07/12/18 189 lb 11.2 oz (86 kg)                  Patient Active Problem List   Diagnosis     CARDIOVASCULAR SCREENING; LDL GOAL LESS THAN 160     Benign essential hypertension     New onset a-fib (H)     Pulmonary nodule, 2mm, RML     Benign  lipomatous neoplasm of skin and subcutaneous tissue of trunk     Past Surgical History:   Procedure Laterality Date     ANESTHESIA CARDIOVERSION N/A 6/29/2018    Procedure: ANESTHESIA CARDIOVERSION;  Cardioversion;  Surgeon: GENERIC ANESTHESIA PROVIDER;  Location: [facility] OR     SURGICAL HISTORY OF -       animal bone graft in the left lower jaw       Social History   Substance Use Topics     Smoking status: Never Smoker     Smokeless tobacco: Never Used     Alcohol use 3.6 - 5.4 oz/week     6 - 9 Cans of beer per week      Comment: 12 drinks a week      Family History   Problem Relation Age of Onset     Diabetes Mother      type 2     Hypertension Mother      Cancer - colorectal Mother      Circulatory Mother      varicose veins     Eye Disorder Mother      cataracts     Hypertension Father      Prostate Cancer Father      Arthritis Father      in his knees     Lipids Father      Eye Disorder Maternal Grandmother      cataracts     Cancer Maternal Grandmother      Cancer Maternal Grandfather      lung     Cerebrovascular Disease Paternal Grandmother      Allergies Other      self, developing allergies      GASTROINTESTINAL DISEASE Other      self, may have ibs     Lipids Brother      Prostate Cancer Brother          Current Outpatient Prescriptions   Medication Sig Dispense Refill     amLODIPine (NORVASC) 5 MG tablet Take 1 tablet (5 mg) by mouth daily 90 tablet 3     apixaban ANTICOAGULANT (ELIQUIS) 5 MG tablet Take 1 tablet (5 mg) by mouth 2 times daily 60 tablet 0     Allergies   Allergen Reactions     Lisinopril      cough     Recent Labs   Lab Test  07/17/18   0630  07/17/18   0150  07/16/18   1728  07/10/18   1130  06/29/18   0345   06/28/18   0930  10/22/15   1433  11/04/14   0910  05/13/11   1045  06/21/10   1631   A1C   --    --    --   5.4   --    --    --    --    --    --    --    LDL   --    --    --    --    --    --    --   80  65   --    --    HDL   --    --    --    --    --    --    --   39*  44   --    " --    TRIG   --    --    --    --    --    --    --   107  261*   --    --    ALT   --    --   25   --    --    --   22   --    --   20  12   CR  1.15  1.18  1.15   --   1.17   --   1.22  1.13  1.23  1.17  1.08   GFRESTIMATED  67  65  67   --   65   --   62  69  63  68  74   GFRESTBLACK  81  78  81   --   79   --   75  83  76  82  90   POTASSIUM   --    --   3.9   --   4.2   < >  4.1  3.7  4.2  4.4  4.1   TSH   --    --    --    --    --    --   1.94   --    --    --   1.48    < > = values in this interval not displayed.        Reviewed and updated as needed this visit by clinical staff  Tobacco  Allergies  Meds  Med Hx  Surg Hx  Fam Hx  Soc Hx        Reviewed and updated as needed this visit by Provider                Hospital Follow-up Visit:    Hospital/Nursing Home/IP Rehab Facility: [facility]  Date of Admission: 7/16/2018  Date of Discharge: 7/17/2018  Reason(s) for Admission: Livan Howard has had a difficult summer.  He was hospitalized on June 28,2018 for new onset Afib which was corrected.  He was again hospitalized overnight on 7/16/-7/17/2018 for chest pain.    He is doing much better but he has some concerns.  He is taking his medications as prescribed.  Intially he was given metoprolol but the side effects were unbearable.  He was switched to norvasc. He is doing much better with that.    Today, he is better but not great.  Intermittent chest twinges that \"come and go.\"  He was told they are insignificant and his heart work up was normal.   Fatigued.  Muscle aches.  Nauseated, bloated.    Lung nodule:  2mm.  Diagnosed on CT scan.  He was told it is insignificant but it worries him.    Last night he had an episode of tingling on his head.  Started on his back left scalp and radiated up to his forehead.  Lasted 5 seconds.  It has not happened since.  He is also having problems with tingling sensations in his legs, intermittent.    Since he was discharged from the hospital he did " "work out, aggressive aerobics, 30-45 minutes. His \"normal\" routine. He felt fine with the work out.           Problems taking medications regularly:  None       Medication changes since discharge: None       Problems adhering to non-medication therapy:  None    Summary of hospitalization:  [facility] discharge summary reviewed  Diagnostic Tests/Treatments reviewed.  Follow up needed: none  Other Healthcare Providers Involved in Patient s Care:         None  Update since discharge: improved.     Post Discharge Medication Reconciliation: discharge medications reconciled, continue medications without change.  Plan of care communicated with patient     Coding guidelines for this visit:  Type of Medical   Decision Making Face-to-Face Visit       within 7 Days of discharge Face-to-Face Visit        within 14 days of discharge   Moderate Complexity [id number]   High Complexity 35286 [id number]            Reviewed and updated as needed this visit by clinical staff  Tobacco  Allergies  Meds  Med Hx  Surg Hx  Fam Hx  Soc Hx      Reviewed and updated as needed this visit by Provider         Lipoma:  Left shoulder blade.  Has been present for years.  It is not getting larger.    Prostate screening:  He is requesting a digital rectal exam and a prostate level today due to a family history.    Review of Systems  CONSTITUTIONAL: NEGATIVE for fever, chills, change in weight  INTEGUMENTARY/SKIN: NEGATIVE for worrisome rashes, moles or lesions  EYES: NEGATIVE for vision changes or irritation  ENT: NEGATIVE for ear, mouth and throat problems  RESP: NEGATIVE for significant cough or SOB  CV: see HPI  GI: see HPI   male: negative for dysuria, hematuria, decreased urinary stream, erectile dysfunction, urethral discharge  MUSCULOSKELETAL: NEGATIVE for significant arthralgias or myalgia  NEURO: NEGATIVE for weakness, dizziness or paresthesias  ENDOCRINE: NEGATIVE for temperature intolerance, skin/hair changes  PSYCHIATRIC: " NEGATIVE for changes in mood or affect    OBJECTIVE:   /89  Pulse 58  Temp 97  F (36.1  C) (Oral)  Resp 20  Ht 6' (1.829 m)  Wt 184 lb (83.5 kg)  SpO2 98%  BMI 24.95 kg/m2    Physical Exam  GENERAL: healthy, alert and no distress  EYES: Eyes grossly normal to inspection, PERRL and conjunctivae and sclerae normal  HENT: right ear: normal: no effusions, no erythema, normal landmarks, left ear: EAC with cerumen impaction. After irrigation the TM is normal.  Nose and mouth without ulcers or lesions, oropharynx clear and oral mucous membranes moist  NECK: no adenopathy, no asymmetry, masses, or scars and thyroid normal to palpation  RESP: lungs clear to auscultation - no rales, rhonchi or wheezes  CV: regular rate and rhythm, normal S1 S2, no S3 or S4, no murmur, click or rub, no peripheral edema and peripheral pulses strong  ABDOMEN: soft, nontender, no hepatosplenomegaly, no masses and bowel sounds normal   (male): normal male genitalia without lesions or urethral discharge, no hernia  RECTAL: normal sphincter tone, no rectal masses, prostate normal size, smooth, nontender without nodules or masses  MS: no gross musculoskeletal defects noted, no edema  SKIN: no suspicious lesions or rashes  NEURO: Normal strength and tone, mentation intact and speech normal  PSYCH: mentation appears normal, affect normal/bright    Labs: pending    ASSESSMENT/PLAN:   (Z00.00) Routine general medical examination at a health care facility  (primary encounter diagnosis)  Comment:   Plan: Lipid panel reflex to direct LDL Fasting, PSA,         screen      Nonfasting labs were drawn today.  I also addressed with him the 5 second episode of skeletal numbness tingling etc.  I did not find any significant findings on his exam today.  He is reassured.  We are hopeful that this is a one-time occurrence.  If it happens again, he will be reevaluated.  I did tell him that we have a neurologist here who deals with numbness and tingling  issues and if he needs I can give him a referral to neurology as well.  He agrees and understands.    (Z09) Hospital discharge follow-up  Comment:   Plan: Lipid panel reflex to direct LDL Fasting, BNP-N        terminal pro          I discussed with the patient the importance of taking his medication every day.  I do believe a lot of the symptoms that he is having today with fatigue, abdominal symptoms etc., are related to getting used to his current medications.  I did reassure him that the fact that he was able to work out aggressively, did not have chest pain, and tolerated it well is a good sign.  I did encourage him to scale back slightly on his aerobic intensity.  We discussed and reviewed heart red flag symptoms and ran to go back to the ER.  He will plan to follow-up with the cardiologist in October as instructed and I did encourage him to follow-up sooner if he had any other problems.  I did do a follow-up BNP today.    (I48.91) New onset a-fib (H)  Comment:   Plan: Continue care with cardiology.  See above.    (R91.8) Pulmonary nodule, 2mm, RML  Comment: 2mm, RML  Plan: I reassured the patient today that coronary nodule 2 mm is low risk as what was indicated by the radiologist.  I did tell the patient that I would like to readdress his pulmonary nodule approximately a year  NB could consider a low-dose CT scan at that time to assess stability and to maintain/ensure that this nodule is not getting larger.  He agrees and understands and he thinks that is a good idea.      (Z13.6) CARDIOVASCULAR SCREENING; LDL GOAL LESS THAN 160  Comment:   Plan: Lipid panel reflex to direct LDL Fasting            (Z12.5) Screening for prostate cancer  Comment:   Plan: PSA, screen        Done today per patient request.    (D17.1) Benign lipomatous neoplasm of skin and subcutaneous tissue of trunk  Comment:   Plan: GENERAL SURG ADULT REFERRAL        He will follow-up with the general surgeon For a consultation and treatment  options.    (H61.22) Impacted cerumen of left ear  Comment:   Plan: REMOVE IMPACTED CERUMEN        Easily removed with irrigation.      COUNSELING:   Reviewed preventive health counseling, as reflected in patient instructions    BP Readings from Last 1 Encounters:   07/19/18 135/89     Estimated body mass index is 24.95 kg/(m^2) as calculated from the following:    Height as of this encounter: 6' (1.829 m).    Weight as of this encounter: 184 lb (83.5 kg).           reports that he has never smoked. He has never used smokeless tobacco.      Counseling Resources:  ATP IV Guidelines  Pooled Cohorts Equation Calculator  FRAX Risk Assessment  ICSI Preventive Guidelines  Dietary Guidelines for Americans, 2010  USDA's MyPlate  ASA Prophylaxis  Lung CA Screening      A total of 30 minutes was spent with the patient above and beyond his routine physical addressing his hospitalization, follow-up, other acute issues today, and coordinating follow-up care.    JOHNNY Taylor [facility]  Answers for HPI/ROS submitted by the patient on 7/19/2018   PHQ-2 Score: 1

## 2018-07-19 NOTE — NURSING NOTE
Manjinder Esparza is a 53 year old male who presents in clinic with complaint of impacted ear wax (cerumen).  Per the order of elyssa sherwood, ear wax was removed from left side  by flushing with warm water and colace solution and manual debridement has not been performed. Patient denies pain/dizziness/discharge/drainage  (if yes, stop procedure and huddle with provider).  Ear wax has been successfully removed. (If not, huddle with provider).   Vick Hannah

## 2018-07-19 NOTE — PROGRESS NOTES
SUBJECTIVE:   Manjinder Esparza is a 53 year old male who presents to clinic today for the following health issues:  Chief Complaint   Patient presents with     Physical     Hospital F/U     See above

## 2018-07-20 NOTE — PROGRESS NOTES
Cuong Howard,    This note is to let you know the results of your recent lab studies.    Your prostate level and cholesterol panel are both normal.    I did a test called N Terminal Pro BNP.  This test was originally done at your initial hospitalization with your A. fib.  At the time of your admission, this result was high.  The recheck that I did the other day came back absolutely normal.  There is no need for additional follow-up with this at this time.    Please follow-up with your cardiologist as we discussed. If there is anything else that I can do for you, please do not hesitate to let me know.    Liat TURNER CNP

## 2018-07-24 ENCOUNTER — TELEPHONE (OUTPATIENT)
Dept: CARDIOLOGY | Facility: CLINIC | Age: 53
End: 2018-07-24

## 2018-07-24 NOTE — TELEPHONE ENCOUNTER
Called and left VM for Pt to return call to clinic to discuss recent Ziopatch Results.  Clinic telephone provided.     Primarily sinus rhythm.  Short runs of SVT with symptomatic episodes of SVE.  Per Dr Ortiz, Pt should continue PRN Metoprolol and keep a log of when it is used.    Dianelys Lakhani LPN

## 2018-07-30 ENCOUNTER — MYC MEDICAL ADVICE (OUTPATIENT)
Dept: FAMILY MEDICINE | Facility: CLINIC | Age: 53
End: 2018-07-30

## 2018-07-30 DIAGNOSIS — I48.91 NEW ONSET A-FIB (H): ICD-10-CM

## 2018-08-03 ENCOUNTER — MYC MEDICAL ADVICE (OUTPATIENT)
Dept: FAMILY MEDICINE | Facility: CLINIC | Age: 53
End: 2018-08-03

## 2018-08-06 ENCOUNTER — CARE COORDINATION (OUTPATIENT)
Dept: CARDIOLOGY | Facility: CLINIC | Age: 53
End: 2018-08-06

## 2018-08-06 DIAGNOSIS — I47.10 PAROXYSMAL SUPRAVENTRICULAR TACHYCARDIA (H): ICD-10-CM

## 2018-08-06 DIAGNOSIS — I10 HTN (HYPERTENSION): Primary | ICD-10-CM

## 2018-08-06 RX ORDER — METOPROLOL SUCCINATE 50 MG/1
50 TABLET, EXTENDED RELEASE ORAL DAILY
Qty: 30 TABLET | Refills: 11 | Status: SHIPPED | OUTPATIENT
Start: 2018-08-06 | End: 2018-11-05

## 2018-08-06 NOTE — PROGRESS NOTES
See my chart messaging.   Amlodipine dc, pt to start metoprolol succinate 50 mg daily  Script sent

## 2018-08-31 ENCOUNTER — MYC REFILL (OUTPATIENT)
Dept: FAMILY MEDICINE | Facility: CLINIC | Age: 53
End: 2018-08-31

## 2018-08-31 DIAGNOSIS — I48.91 NEW ONSET A-FIB (H): ICD-10-CM

## 2018-08-31 NOTE — TELEPHONE ENCOUNTER
Message from [employer]:  Original authorizing provider: JOHNNY Taylor CNP would like a refill of the following medications:  apixaban ANTICOAGULANT (ELIQUIS) 5 MG tablet [JOHNNY Taylor CNP]    Preferred pharmacy: Other - [facility] in [facility] [place] ([place] [phone])    Comment:  I'm out of renewals for the Eliquis and would like one last new one so I can stay on the medication up to my cardiologist follow-up appt in sept. Please send prescription to the [facility] in [facility]. Thanks Ray

## 2018-09-01 ENCOUNTER — APPOINTMENT (OUTPATIENT)
Dept: GENERAL RADIOLOGY | Facility: CLINIC | Age: 53
End: 2018-09-01
Attending: STUDENT IN AN ORGANIZED HEALTH CARE EDUCATION/TRAINING PROGRAM
Payer: COMMERCIAL

## 2018-09-01 ENCOUNTER — HOSPITAL ENCOUNTER (EMERGENCY)
Facility: CLINIC | Age: 53
Discharge: HOME | End: 2018-09-01
Attending: STUDENT IN AN ORGANIZED HEALTH CARE EDUCATION/TRAINING PROGRAM | Admitting: STUDENT IN AN ORGANIZED HEALTH CARE EDUCATION/TRAINING PROGRAM
Payer: COMMERCIAL

## 2018-09-01 ENCOUNTER — APPOINTMENT (OUTPATIENT)
Dept: CT IMAGING | Facility: CLINIC | Age: 53
End: 2018-09-01
Attending: STUDENT IN AN ORGANIZED HEALTH CARE EDUCATION/TRAINING PROGRAM
Payer: COMMERCIAL

## 2018-09-01 VITALS
OXYGEN SATURATION: 93 % | RESPIRATION RATE: 18 BRPM | SYSTOLIC BLOOD PRESSURE: 133 MMHG | HEIGHT: 72 IN | WEIGHT: 190 LBS | TEMPERATURE: 97.7 F | BODY MASS INDEX: 25.73 KG/M2 | DIASTOLIC BLOOD PRESSURE: 103 MMHG

## 2018-09-01 DIAGNOSIS — S00.81XA ABRASION OF FOREHEAD, INITIAL ENCOUNTER: ICD-10-CM

## 2018-09-01 DIAGNOSIS — S09.90XA CLOSED HEAD INJURY, INITIAL ENCOUNTER: ICD-10-CM

## 2018-09-01 DIAGNOSIS — S01.81XA FACIAL LACERATION, INITIAL ENCOUNTER: ICD-10-CM

## 2018-09-01 PROCEDURE — 99284 EMERGENCY DEPT VISIT MOD MDM: CPT | Mod: 25 | Performed by: STUDENT IN AN ORGANIZED HEALTH CARE EDUCATION/TRAINING PROGRAM

## 2018-09-01 PROCEDURE — 90715 TDAP VACCINE 7 YRS/> IM: CPT | Performed by: STUDENT IN AN ORGANIZED HEALTH CARE EDUCATION/TRAINING PROGRAM

## 2018-09-01 PROCEDURE — 70450 CT HEAD/BRAIN W/O DYE: CPT

## 2018-09-01 PROCEDURE — 25000128 H RX IP 250 OP 636: Performed by: STUDENT IN AN ORGANIZED HEALTH CARE EDUCATION/TRAINING PROGRAM

## 2018-09-01 PROCEDURE — 12013 RPR F/E/E/N/L/M 2.6-5.0 CM: CPT | Mod: Z6 | Performed by: STUDENT IN AN ORGANIZED HEALTH CARE EDUCATION/TRAINING PROGRAM

## 2018-09-01 PROCEDURE — 12013 RPR F/E/E/N/L/M 2.6-5.0 CM: CPT

## 2018-09-01 PROCEDURE — 72040 X-RAY EXAM NECK SPINE 2-3 VW: CPT

## 2018-09-01 PROCEDURE — 90471 IMMUNIZATION ADMIN: CPT

## 2018-09-01 PROCEDURE — 99284 EMERGENCY DEPT VISIT MOD MDM: CPT | Mod: 25

## 2018-09-01 RX ORDER — CEPHALEXIN 500 MG/1
500 CAPSULE ORAL 4 TIMES DAILY
Qty: 28 CAPSULE | Refills: 0 | Status: SHIPPED | OUTPATIENT
Start: 2018-09-01 | End: 2018-09-08

## 2018-09-01 RX ADMIN — CLOSTRIDIUM TETANI TOXOID ANTIGEN (FORMALDEHYDE INACTIVATED), CORYNEBACTERIUM DIPHTHERIAE TOXOID ANTIGEN (FORMALDEHYDE INACTIVATED), BORDETELLA PERTUSSIS TOXOID ANTIGEN (GLUTARALDEHYDE INACTIVATED), BORDETELLA PERTUSSIS FILAMENTOUS HEMAGGLUTININ ANTIGEN (FORMALDEHYDE INACTIVATED), BORDETELLA PERTUSSIS PERTACTIN ANTIGEN, AND BORDETELLA PERTUSSIS FIMBRIAE 2/3 ANTIGEN 0.5 ML: 5; 2; 2.5; 5; 3; 5 INJECTION, SUSPENSION INTRAMUSCULAR at 02:41

## 2018-09-01 NOTE — ED AVS SNAPSHOT
[facility] Emergency Department    [address]    [facility] [place], [place] [address]    Phone:  [phone]    Fax:  [phone]                                       Manjinder Esparza   MRN: [id number]    Department:  [facility] Emergency Department   Date of Visit:  9/1/2018           Patient Information     Date Of Birth          1965        Your diagnoses for this visit were:     Facial laceration, initial encounter     Abrasion of forehead, initial encounter     Closed head injury, initial encounter        You were seen by Rakesh Klein DO.      Follow-up Information     Follow up with Liat Staples APRN CNP. Schedule an appointment as soon as possible for a visit in 5 days.    Specialty:  Nurse Practitioner    Why:  For reevaluation of wound and suture removal.    Contact information:    [address]  [place] [address]  [phone]          Discharge Instructions         Laceration, Chin, Suture Or Tape  A laceration is a cut through the skin. If it is deep, it may need stitches. Minor cuts may be treated with surgical tape, skin glue, or other basic dressings.  Home care  These guidelines will help as your laceration heals:    If a bandage was applied and it becomes wet or dirty, replace it. Otherwise, leave it in place for the first 24 hours, then change it once a day or as directed.    If sutures were used, clean the wound daily:  ? Wash the area with soap and water. Use water on a cotton swab to loosen and remove any blood or crust that forms.  ? After cleaning, keep the wound clean and dry. Talk with your doctor before applying any antibiotic ointment to the wound.  ? You may shower as usual after the first 24 hours. But don't soak the area in water until the sutures are removed. This means no tub baths or swimming.    If surgical tape was used, keep the area clean and dry. If it becomes wet, blot it dry with a towel.    Your doctor may prescribe or recommend an  antibiotic cream or ointment to prevent infection. Don't stop taking this medicine until you have finished the prescribed course or your doctor tells you to stop. Your doctor may also prescribe medicines for pain. Follow the doctor s instructions for taking these medicines. You may use over-the-counter pain medicine if no other pain medicine was prescribed. Talk with your doctor before using these medicines if you have chronic liver or kidney disease. Also talk with your doctor if you have ever had a stomach ulcer or gastrointestinal bleeding.    Certain types of skin glues can't be used if you have an allergy to latex or formaldehyde. Tell your doctor right away about any allergies.  Follow-up care  Follow up with your healthcare provider, or as advised. Most chin lacerations heal within 5 to 7 days. But your wound may become infected even with proper treatment. You should check the wound daily for signs of infection listed below. Stitches should be removed from the chin within 5 days. If tape closures were used, remove them yourself if they have not fallen off after 5 days.  When to seek medical advice  Call your healthcare provider right away if any of these occur:    Pain in the wound that gets worse    Redness, swelling, or pus coming from the wound    Fever of 100.4 F (38 C) or higher, or as directed by your healthcare provider    Bleeding not controlled by direct pressure    Wound edges reopen    Sutures come apart or surgical tape falls off before 5 days  Date Last Reviewed: 10/1/2016    5996-8311 [employer]. [address] [place] [address], [place] [address]. All rights reserved. This information is not intended as a substitute for professional medical care. Always follow your healthcare professional's instructions.          Your next 10 appointments already scheduled     Sep 06, 2018  4:30 PM CDT   (Arrive by 4:15 PM)   RETURN ATRIAL FIBULATION VISIT with Tr Hendrickson MD   [facility]  ([facility])    [address]  [address] [place] [address] [phone]              24 Hour Appointment Hotline       To make an appointment at any [facility], call [phone] ([phone]). If you don't have a family doctor or clinic, we will help you find one. [facility] clinics are conveniently located to serve the needs of you and your family.             Review of your medicines      START taking        Dose / Directions Last dose taken    cephALEXin 500 MG capsule   Commonly known as:  KEFLEX   Dose:  500 mg   Quantity:  28 capsule        Take 1 capsule (500 mg) by mouth 4 times daily for 7 days   Refills:  0          Our records show that you are taking the medicines listed below. If these are incorrect, please call your family doctor or clinic.        Dose / Directions Last dose taken    apixaban ANTICOAGULANT 5 MG tablet   Commonly known as:  ELIQUIS   Dose:  5 mg   Quantity:  60 tablet        Take 1 tablet (5 mg) by mouth 2 times daily   Refills:  2        metoprolol succinate 50 MG 24 hr tablet   Commonly known as:  TOPROL-XL   Dose:  50 mg   Quantity:  30 tablet        Take 1 tablet (50 mg) by mouth daily   Refills:  11                Prescriptions were sent or printed at these locations (1 Prescription)                   Other Prescriptions                Printed at Department/Unit printer (1 of 1)         cephALEXin (KEFLEX) 500 MG capsule                Procedures and tests performed during your visit     Cervical spine XR, 2-3 views    Head CT w/o contrast      Orders Needing Specimen Collection     None      Pending Results     Date and Time Order Name Status Description    9/1/2018 0127 Cervical spine XR, 2-3 views Preliminary             Pending Culture Results     No orders found from 8/30/2018 to 9/2/2018.            Pending Results Instructions     If you had any lab results that were not finalized at the time of your Discharge, you can call  the ED Lab Result RN at [phone]. You will be contacted by this team for any positive Lab results or changes in treatment. The nurses are available 7 days a week from 10A to 6:30P.  You can leave a message 24 hours per day and they will return your call.        Test Results From Your Hospital Stay        9/1/2018  2:23 AM      Narrative     CT HEAD W/O CONTRAST  9/1/2018 2:08 AM    HISTORY: Fall.    TECHNIQUE: Volumetric helical acquisition through the head without IV  contrast, displayed as 0.5 cm axial reconstructions. Radiation dose  for this scan was reduced using automated exposure control, adjustment  of the mA and/or kV according to patient size, or iterative  reconstruction technique.    COMPARISON: None.    FINDINGS: No acute intracranial abnormality. No intracranial  hemorrhage. Ventricles are of normal size and configuration. The  visualized paranasal sinuses and mastoid air cells appear normal. No  fractures are seen.        Impression     IMPRESSION: No acute intracranial abnormality.    JENNIFER RIBEIRO MD         9/1/2018  2:38 AM      Narrative     CERVICAL SPINE THREE VIEWS  9/1/2018 2:17 AM     HISTORY: Fall. Intoxication.    COMPARISON: None.        Impression     IMPRESSION: No evidence for acute fracture or gross malalignment in  the cervical spine. No prevertebral soft tissue thickening. The dens  appears intact where visualized.                Thank you for choosing [facility]       Thank you for choosing [facility] for your care. Our goal is always to provide you with excellent care. Hearing back from our patients is one way we can continue to improve our services. Please take a few minutes to complete the written survey that you may receive in the mail after you visit with us. Thank you!        [employer] Information     [employer] gives you secure access to your electronic health record. If you see a primary care provider, you can also send messages to your care team and make appointments. If you  have questions, please call your primary care clinic.  If you do not have a primary care provider, please call [phone] and they will assist you.        Care EveryWhere ID     This is your Care EveryWhere ID. This could be used by other organizations to access your [facility] medical records  [id number]        Equal Access to Services     AMY ASTUDILLO : Celine Keenan, bailee batista, tea cast. So [facility] [phone].    ATENCIÓN: Si habla español, tiene a hastings disposición servicios gratuitos de asistencia lingüística. Llame al [phone].    We comply with applicable federal civil rights laws and [place] laws. We do not discriminate on the basis of race, color, national origin, age, disability, sex, sexual orientation, or gender identity.            After Visit Summary       This is your record. Keep this with you and show to your community pharmacist(s) and doctor(s) at your next visit.

## 2018-09-01 NOTE — ED PROVIDER NOTES
History     Chief Complaint   Patient presents with     Laceration     L chin area / not active bleeding/ 2-3 inches     HPI  Manjinder Esparza is a 53 year old male with past medical history which includes atrial fibrillation and anticoagulation with Eliquis who presents for evaluation of chin laceration sustained during fall nearly 2 hours prior to arrival.  Patient explains that he was drinking alcohol this evening and fell [place] standing [place] his dock, his head struck the edge of his boat resulting in a large laceration across the left side of his mandible.  The fall was witnessed by accompanying significant other who maintains that he did not suffer loss of consciousness or confusion afterwards.  Patient has since been ambulatory and without complaint of neck pain, back pain, or other injuries.  Bleeding controlled prior to arrival.  Patient's only complaint is the facial laceration.    Problem List:    Patient Active Problem List    Diagnosis Date Noted     Pulmonary nodule, 2mm, RML 07/19/2018     Priority: Medium     2mm, RML       Benign lipomatous neoplasm of skin and subcutaneous tissue of trunk 07/19/2018     Priority: Medium     New onset a-fib (H) 06/28/2018     Priority: Medium     Benign essential hypertension 11/07/2016     Priority: Medium     CARDIOVASCULAR SCREENING; LDL GOAL LESS THAN 160 05/09/2010     Priority: Medium     Per provider          Past Medical History:    Past Medical History:   Diagnosis Date     Hypertension        Past Surgical History:    Past Surgical History:   Procedure Laterality Date     ANESTHESIA CARDIOVERSION N/A 6/29/2018    Procedure: ANESTHESIA CARDIOVERSION;  Cardioversion;  Surgeon: GENERIC ANESTHESIA PROVIDER;  Location: [facility] OR     SURGICAL HISTORY OF -       animal bone graft in the left lower jaw       Family History:    Family History   Problem Relation Age of Onset     Diabetes Mother      type 2     Hypertension Mother      Cancer - colorectal Mother       Circulatory Mother      varicose veins     Eye Disorder Mother      cataracts     Hypertension Father      Prostate Cancer Father      Arthritis Father      in his knees     Lipids Father      Eye Disorder Maternal Grandmother      cataracts     Cancer Maternal Grandmother      Cancer Maternal Grandfather      lung     Cerebrovascular Disease Paternal Grandmother      Allergies Other      self, developing allergies      GASTROINTESTINAL DISEASE Other      self, may have ibs     Lipids Brother      Prostate Cancer Brother        Social History:  Marital Status:  Single [1]  Social History   Substance Use Topics     Smoking status: Never Smoker     Smokeless tobacco: Never Used     Alcohol use 3.6 - 5.4 oz/week     6 - 9 Cans of beer per week      Comment: 12 drinks a week         Medications:      cephALEXin (KEFLEX) 500 MG capsule   apixaban ANTICOAGULANT (ELIQUIS) 5 MG tablet   metoprolol succinate (TOPROL-XL) 50 MG 24 hr tablet         Review of Systems  Constitutional:  Negative for fever or recent illness.  HENT:  Negative intraoral injury or dental fracture.  Eye:  Negative for visual changes from baseline.  Cardiovascular:  Negative for chest pain.  Respiratory:  Negative for shortness of breath.  Gastrointestinal:  Negative for abdominal pain, nausea or vomiting.  Musculoskeletal:  Negative for neck pain, back pain, or extremity injury.  Neurological:  Negative for headache.  Skin:  Positive for chin laceration.    All others reviewed and are negative.      Physical Exam   BP: (!) 133/103  Heart Rate: 65  Temp: 97.7  F (36.5  C)  Resp: 18  Height: 182.9 cm (6')  Weight: 86.2 kg (190 lb)  SpO2: 97 %      Physical Exam  Constitutional:  Well developed, well nourished.  Appears stable and in no acute distress.  Head: Small abrasion over right side of forehead, 4 cm horizontal laceration across left mandible which does not titrate into mouth.  Negative for Raccoon eyes and Dowell sign.  No tenderness to palpation  of facial bones or skull circumferentially.  Eye:  No obvious proptosis or subconjunctival hemorrhage.  Eyelids appear symmetrical.  PERRLA.    Nose:  Negative for nasal deformity or septal hematoma.  Oral:  Patient is without trismus or malocclusion.  Moist oral mucosa without oral laceration.  No acute dental fracture, subluxation, or avulsion.    Ears:  Typical appearance of the external auditory canal bilaterally, tympanic membranes visualized and without hemotympanum.  No mastoid region tenderness.  Neck:  No tenderness to palpation of midline cervical vertebra.  Ranging freely and without pain.  Not held in self-protecting position.    Cardiovascular:  No cyanosis.  RRR.  No audible murmurs noted.   Respiratory/chest:  Effort normal, no respiratory distress.  CTAB without diminished regions bilaterally.  Gastrointestinal:  Soft, nontender nondistended abdomen.  No tenderness, guarding, rigidity, or rebound tenderness.  No palpable organomegaly.  Genitourinary/rectal:  Noncontributory.   Musculoskeletal:  No extremity wounds or deformities.  No hip tenderness or pelvic instability.  No step-offs and no tenderness to palpation of midline thoracic or lumbosacral vertebra.  Moves extremities spontaneously and without complaint.    Neuro:  Patient is alert and oriented.   Skin:  Skin is warm and dry, not diaphoretic.   Psych:  Normal mood and affect, not overly anxious or clinically intoxicated.    Thomas Coma Scale - GCS (calculator)    Motor 6=Obeys commands   Verbal 5=Oriented   Eye Opening 4=Spontaneous   Total: 15       ED Course     ED Course     Procedures               Critical Care time:  none               Results for orders placed or performed during the hospital encounter of 09/01/18 (from the past 24 hour(s))   Head CT w/o contrast    Narrative    CT HEAD W/O CONTRAST  9/1/2018 2:08 AM    HISTORY: Fall.    TECHNIQUE: Volumetric helical acquisition through the head without IV  contrast, displayed as  0.5 cm axial reconstructions. Radiation dose  for this scan was reduced using automated exposure control, adjustment  of the mA and/or kV according to patient size, or iterative  reconstruction technique.    COMPARISON: None.    FINDINGS: No acute intracranial abnormality. No intracranial  hemorrhage. Ventricles are of normal size and configuration. The  visualized paranasal sinuses and mastoid air cells appear normal. No  fractures are seen.      Impression    IMPRESSION: No acute intracranial abnormality.    JENNIFER RIBEIRO MD   Cervical spine XR, 2-3 views    Narrative    CERVICAL SPINE THREE VIEWS  9/1/2018 2:17 AM     HISTORY: Fall. Intoxication.    COMPARISON: None.      Impression    IMPRESSION: No evidence for acute fracture or gross malalignment in  the cervical spine. No prevertebral soft tissue thickening. The dens  appears intact where visualized.       Medications   lidocaine 2%-EPINEPHrine 1:100,000 2-1:028671 % injection (not administered)   Tdap (tetanus-diphtheria-acell pertussis) (ADACEL) injection 0.5 mL (0.5 mLs Intramuscular Given 9/1/18 0241)       Assessments & Plan (with Medical Decision Making)   Manjinder Esparza is a 53 year old male who presents to the department for evaluation of chin laceration after witnessed fall and closed head injury suffered prior to arrival. Based on his symptoms and the clinical examination, there is no evidence to suggest skull fracture, cervical spine injury, dental fracture, neurologic deficits, or through-and-through intraoral laceration.  CT imaging of head/brain independently reviewed, radiologist notes no evidence of acute intracranial abnormality.  His abrasion and laceration were cleaned, irrigated, thoroughly inspected and no foreign body or heavy contamination found.  The laceration repaired, wound edges well approximated, and the patient tolerated well without complications.  After repair antibiotic ointment was applied and the wound was dressed.   They were instructed to remove the bandage within 24 hours and apply OTC antibiotic ointment twice daily while healing.  Recommended follow-up in 4-5 days at a primary care clinic for reevaluation of the wound and removal of sutures.     During the repair we discussed the likelihood that there will be some residual scarring.  Although the wound was thoroughly cleaned/irrigated, he has been instructed to monitor the wound closely for healing or signs of infection such as increasing pain, redness, discharge, or fever.  If any are present they should return to the emergency department.      Disclaimer: This note consists of symbols derived from keyboarding, dictation, and/or voice recognition software. As a result, there may be errors in the script that have gone undetected.  Please consider this when interpreting information found in the chart.         I have reviewed the nursing notes.    I have reviewed the findings, diagnosis, plan and need for follow up with the patient.       New Prescriptions    CEPHALEXIN (KEFLEX) 500 MG CAPSULE    Take 1 capsule (500 mg) by mouth 4 times daily for 7 days       Final diagnoses:   Facial laceration, initial encounter   Abrasion of forehead, initial encounter   Closed head injury, initial encounter       9/1/2018   [facility] EMERGENCY DEPARTMENT     Rakesh Klein DO  09/01/18 0315

## 2018-09-01 NOTE — ED AVS SNAPSHOT
[facility] Emergency Department    [address] [address]    Phone:  [phone]    Fax:  [phone]                                       Manjinder Esparza   MRN: [id number]    Department:  [facility] Emergency Department   Date of Visit:  9/1/2018           After Visit Summary Signature Page     I have received my discharge instructions, and my questions have been answered. I have discussed any challenges I see with this plan with the nurse or doctor.    ..........................................................................................................................................  Patient/Patient Representative Signature      ..........................................................................................................................................  Patient Representative Print Name and Relationship to Patient    ..................................................               ................................................  Date                                            Time    ..........................................................................................................................................  Reviewed by Signature/Title    ...................................................              ..............................................  Date                                                            Time          22EPIC Rev 08/18

## 2018-09-01 NOTE — ED TRIAGE NOTES
Pt states that he has been drinking alcohol and he slipped and fell hurting and lacerating his chin. He denies LOC.

## 2018-09-01 NOTE — ED NOTES
Patient has laceration on left side of chin  And abrasion on forehead  Has been drinking alcohol. Patient Tetanus shot  2011    Alert to self.  Patient unsteady on feet

## 2018-09-01 NOTE — DISCHARGE INSTRUCTIONS
Laceration, Chin, Suture Or Tape  A laceration is a cut through the skin. If it is deep, it may need stitches. Minor cuts may be treated with surgical tape, skin glue, or other basic dressings.  Home care  These guidelines will help as your laceration heals:    If a bandage was applied and it becomes wet or dirty, replace it. Otherwise, leave it in place for the first 24 hours, then change it once a day or as directed.    If sutures were used, clean the wound daily:  ? Wash the area with soap and water. Use water on a cotton swab to loosen and remove any blood or crust that forms.  ? After cleaning, keep the wound clean and dry. Talk with your doctor before applying any antibiotic ointment to the wound.  ? You may shower as usual after the first 24 hours. But don't soak the area in water until the sutures are removed. This means no tub baths or swimming.    If surgical tape was used, keep the area clean and dry. If it becomes wet, blot it dry with a towel.    Your doctor may prescribe or recommend an antibiotic cream or ointment to prevent infection. Don't stop taking this medicine until you have finished the prescribed course or your doctor tells you to stop. Your doctor may also prescribe medicines for pain. Follow the doctor s instructions for taking these medicines. You may use over-the-counter pain medicine if no other pain medicine was prescribed. Talk with your doctor before using these medicines if you have chronic liver or kidney disease. Also talk with your doctor if you have ever had a stomach ulcer or gastrointestinal bleeding.    Certain types of skin glues can't be used if you have an allergy to latex or formaldehyde. Tell your doctor right away about any allergies.  Follow-up care  Follow up with your healthcare provider, or as advised. Most chin lacerations heal within 5 to 7 days. But your wound may become infected even with proper treatment. You should check the wound daily for signs of infection  listed below. Stitches should be removed from the chin within 5 days. If tape closures were used, remove them yourself if they have not fallen off after 5 days.  When to seek medical advice  Call your healthcare provider right away if any of these occur:    Pain in the wound that gets worse    Redness, swelling, or pus coming from the wound    Fever of 100.4 F (38 C) or higher, or as directed by your healthcare provider    Bleeding not controlled by direct pressure    Wound edges reopen    Sutures come apart or surgical tape falls off before 5 days  Date Last Reviewed: 10/1/2016    5682-0983 [employer]. [address] [place] [address], [place] [address]. All rights reserved. This information is not intended as a substitute for professional medical care. Always follow your healthcare professional's instructions.

## 2018-09-06 ENCOUNTER — ALLIED HEALTH/NURSE VISIT (OUTPATIENT)
Dept: NURSING | Facility: CLINIC | Age: 53
End: 2018-09-06
Payer: COMMERCIAL

## 2018-09-06 ENCOUNTER — OFFICE VISIT (OUTPATIENT)
Dept: CARDIOLOGY | Facility: CLINIC | Age: 53
End: 2018-09-06
Attending: INTERNAL MEDICINE
Payer: COMMERCIAL

## 2018-09-06 VITALS
WEIGHT: 188 LBS | HEART RATE: 64 BPM | HEIGHT: 72 IN | BODY MASS INDEX: 25.47 KG/M2 | SYSTOLIC BLOOD PRESSURE: 144 MMHG | OXYGEN SATURATION: 98 % | DIASTOLIC BLOOD PRESSURE: 96 MMHG

## 2018-09-06 DIAGNOSIS — I48.91 NEW ONSET A-FIB (H): ICD-10-CM

## 2018-09-06 DIAGNOSIS — Z48.02 ENCOUNTER FOR REMOVAL OF SUTURES: Primary | ICD-10-CM

## 2018-09-06 DIAGNOSIS — I48.0 PAROXYSMAL ATRIAL FIBRILLATION (H): Primary | ICD-10-CM

## 2018-09-06 PROCEDURE — 93010 ELECTROCARDIOGRAM REPORT: CPT | Mod: ZP | Performed by: INTERNAL MEDICINE

## 2018-09-06 PROCEDURE — 99214 OFFICE O/P EST MOD 30 MIN: CPT | Mod: 25 | Performed by: INTERNAL MEDICINE

## 2018-09-06 PROCEDURE — 93005 ELECTROCARDIOGRAM TRACING: CPT | Mod: ZF

## 2018-09-06 PROCEDURE — G0463 HOSPITAL OUTPT CLINIC VISIT: HCPCS | Mod: 25,ZF

## 2018-09-06 PROCEDURE — 99207 ZZC NO CHARGE NURSE ONLY: CPT

## 2018-09-06 RX ORDER — METOPROLOL TARTRATE 50 MG
TABLET ORAL
COMMUNITY
Start: 2018-06-29 | End: 2018-11-29

## 2018-09-06 ASSESSMENT — PAIN SCALES - GENERAL: PAINLEVEL: MILD PAIN (3)

## 2018-09-06 NOTE — NURSING NOTE
Manjinder Esparza presents to the clinic for removal of sutures and sutures,staples, steri strips. The patient has had sutures in place for 5 days. There has been no patient reported signs or symptoms of infection or drainage. 12  sutures and sutures,staples, staple, steri strips are seen and located on the left lateral side of chin - start medial below lip and then laceration goes laterally in a horizontal direction along jawline. Tetanus status is up to date.     Small amount of purulent drainage seen surrounding two sutures located near left lateral end - area is pink/red - tenderness with palpation - patient has been using antibiotic ointment suture area is very moist    Provider - Darrian - reviewed and is OK to continue with removal - no concern for infection at this time - believes removing suture will assist with area being slightly irritated    - encouraged patient to allow area to dry more - he is insistent on placing steri strips - two were placed on left lateral side using tincure of benoin - discussed vitamin E scar cream could be used in the future - avoid shaving for the next couple of days    All sutures and sutures, were easily removed today. Routine wound care discussed by the RN or provider. The patient will follow up as needed.    Esteban Miramontes RN

## 2018-09-06 NOTE — PROGRESS NOTES
HPI:     53 year old male with a history of hypertension and recent admission for afib presents for f/u.  He has not had any symptom recurrence since his cardioversion in July.  He is known to have hypertension and did not tolerate standard dose beta-blocker or Norvasc 5 mg.  He is now being treated with a lower dose of beta-blocker and seems to be tolerating it well.    Mr. Esparza did suffer an accidental fall off a dock into his boat recently.  He denies any loss of consciousness or any cardiac symptoms prior to the event.  He has just had his stitches removed today.    We did discuss follow-up plans and inasmuch as Mr. Esparza is chads-Norvasc 1 he could have his anticoagulation stopped if no A. fib recurrences are present in 3-4 months after the initial event.  He will contact us in the end of October and we will make a decision at that time.  In the meantime he may need a further refill on his apixaban.    Since his last visit he has not had any intercurrent illness of any note and denies any cardiovascular complaints.      PAST MEDICAL HISTORY:  Past Medical History:   Diagnosis Date     Hypertension        CURRENT MEDICATIONS:  Current Outpatient Prescriptions   Medication Sig Dispense Refill     apixaban ANTICOAGULANT (ELIQUIS) 5 MG tablet Take 1 tablet (5 mg) by mouth 2 times daily 60 tablet 2     cephALEXin (KEFLEX) 500 MG capsule Take 1 capsule (500 mg) by mouth 4 times daily for 7 days 28 capsule 0     metoprolol succinate (TOPROL-XL) 50 MG 24 hr tablet Take 1 tablet (50 mg) by mouth daily 30 tablet 11     metoprolol tartrate (LOPRESSOR) 50 MG tablet          PAST SURGICAL HISTORY:  Past Surgical History:   Procedure Laterality Date     ANESTHESIA CARDIOVERSION N/A 6/29/2018    Procedure: ANESTHESIA CARDIOVERSION;  Cardioversion;  Surgeon: GENERIC ANESTHESIA PROVIDER;  Location: [facility] OR     SURGICAL HISTORY OF -       animal bone graft in the left lower jaw       ALLERGIES:     Allergies   Allergen  Reactions     Lisinopril      cough       FAMILY HISTORY:  Family History   Problem Relation Age of Onset     Diabetes Mother      type 2     Hypertension Mother      Cancer - colorectal Mother      Circulatory Mother      varicose veins     Eye Disorder Mother      cataracts     Hypertension Father      Prostate Cancer Father      Arthritis Father      in his knees     Lipids Father      Eye Disorder Maternal Grandmother      cataracts     Cancer Maternal Grandmother      Cancer Maternal Grandfather      lung     Cerebrovascular Disease Paternal Grandmother      Allergies Other      self, developing allergies      GASTROINTESTINAL DISEASE Other      self, may have ibs     Lipids Brother      Prostate Cancer Brother      - Premature coronary artery disease  - Atrial fibrillation  - Sudden cardiac death     SOCIAL HISTORY:  Social History   Substance Use Topics     Smoking status: Never Smoker     Smokeless tobacco: Never Used     Alcohol use 3.6 - 5.4 oz/week     6 - 9 Cans of beer per week      Comment: 12 drinks a week        ROS:   Constitutional: No fever, chills, or sweats. Weight stable.   ENT: No visual disturbance, ear ache, epistaxis, sore throat.   Cardiovascular: As per HPI.   Respiratory: No cough, hemoptysis.    GI: No nausea, vomiting, hematemesis, melena, or hematochezia.   : No hematuria.   Integument: Negative.   Psychiatric: Negative.   Hematologic:  Easy bruising, no easy bleeding.  Neuro: Negative.   Endocrinology: No significant heat or cold intolerance   Musculoskeletal: No myalgia.    Exam:  BP (!) 144/96 (BP Location: Right arm, Patient Position: Chair, Cuff Size: Adult Regular)  Pulse 64  Ht 1.829 m (6')  Wt 85.3 kg (188 lb)  SpO2 98%  BMI 25.5 kg/m2  GENERAL APPEARANCE: healthy, alert and no distress.  He is feeling laceration on his chin is noted.  HEENT: no icterus, no xanthelasmas, normal pupil size and reaction, normal palate, mucosa moist, no central cyanosis  NECK: no  adenopathy, no asymmetry, masses, or scars, thyroid normal to palpation and no bruits, JVP not elevated  RESPIRATORY: lungs clear to auscultation - no rales, rhonchi or wheezes, no use of accessory muscles, no retractions, respirations are unlabored, normal respiratory rate  CARDIOVASCULAR: regular rhythm, normal S1 with physiologic split S2, no S3 or S4 and no murmur, click or rub, precordium quiet with normal PMI.  ABDOMEN: soft, non tender, without hepatosplenomegaly, no masses palpable, bowel sounds normal,   EXTREMITIES: peripheral pulses normal, no edema, no bruits  NEURO: alert and oriented to person/place/time, normal speech, gait and affect  VASC: pulses are normal in volumes and symmetric bilaterally. No bruits are heard.  SKIN: no ecchymoses, no rashes    Labs:  CBC RESULTS:   Lab Results   Component Value Date    WBC 9.5 2018    RBC 4.69 2018    HGB 14.7 2018    HCT 42.4 2018    MCV 90 2018    MCH 31.3 2018    MCHC 34.7 2018    RDW 12.7 2018     2018       BMP RESULTS:  Lab Results   Component Value Date     2018    POTASSIUM 3.9 2018    CHLORIDE 106 2018    CO2 25 2018    ANIONGAP 10 2018     (H) 2018    BUN 19 2018    CR 1.15 2018    GFRESTIMATED 67 2018    GFRESTBLACK 81 2018    JESUS 8.7 2018        INR RESULTS:  Lab Results   Component Value Date    INR 1.18 (H) 2018    INR 1.16 (H) 2018       Procedures:  PULMONARY FUNCTION TESTS:   No flowsheet data found.      ECHOCARDIOGRAM:   Recent Results (from the past 8760 hour(s))   Echocardiogram Complete    Narrative    [id number]  [id number]  [id number]  [id number]^EMERY^BRIGHT^E           [facility]  Echocardiography Laboratory  [address] [place] [address]     Name: ROCCO CASSIDY  MRN: [id number]  : 1965  Study Date: 2018 12:33 PM  Age: 53  yrs  Gender: Male  Patient Location: [facility]  Reason For Study: Afib  Ordering Physician: BRIGHT LAND  Performed By: Cristobal Jacobs RDCS     BSA: 0.34 m2  Height: 6 in  Weight: 185 lb  BP: 141/109 mmHg  _____________________________________________________________________________  __        Procedure  Complete Portable Echo Adult. Echocardiogram with two-dimensional, color and  spectral Doppler performed.  _____________________________________________________________________________  __        Interpretation Summary  Left ventricular function, chamber size, wall motion, and wall thickness are  normal.The EF is 55-60%.  Normal right ventricular size and systolic function.  Mild left atrial enlargement is present.  No significant valve disease.  _____________________________________________________________________________  __        Left Ventricle  Left ventricular function, chamber size, wall motion, and wall thickness are  normal.The EF is 55-60%. Diastolic function not assessed due to atrial  fibrillation.     Right Ventricle  The right ventricle is normal size. Global right ventricular function is  normal.     Atria  The right atria appears normal. Mild left atrial enlargement is present.        Mitral Valve  The mitral valve is normal. Trace mitral insufficiency is present.     Aortic Valve  Aortic valve is normal in structure and function. The aortic valve is  tricuspid. No aortic regurgitation is present.     Tricuspid Valve  The tricuspid valve is normal. Trace to mild tricuspid insufficiency is  present. The right ventricular systolic pressure is approximated at 17.2 mmHg  plus the right atrial pressure. Pulmonary artery systolic pressure is normal.     Pulmonic Valve  The pulmonic valve is normal.     Vessels  The aorta root is normal. The inferior vena cava is normal. Estimated mean  right atrial pressure is 3 mmHg (normal).     Pericardium  No pericardial effusion is present.      _____________________________________________________________________________  __  MMode/2D Measurements & Calculations  RVDd: 3.6 cm  IVSd: 0.97 cm  LVIDd: 5.1 cm  LVIDs: 3.7 cm  LVPWd: 0.79 cm     FS: 28.3 %  LV mass(C)d: 159.8 grams  LV mass(C)dI: 469.9 grams/m2  asc Aorta Diam: 3.6 cm  LVOT diam: 2.2 cm  LVOT area: 4.0 cm2  LA Volume (BP): 81.7 ml  RWT: 0.31  TAPSE: 2.0 cm        Doppler Measurements & Calculations  TV max P.2 mmHg  TR max noe: 207.3 cm/sec  TR max P.2 mmHg        _____________________________________________________________________________  __        Report approved by: June Joseph 2018 01:16 PM            Assessment and Plan:     53 year old male with a history of hypertension and recent admission for afib presents for f/u. Had a FRANCISCO JAVIER/Cardioversion which resulted in conversion to NSR.  Lab work unremarkable, trop negative x1. Echo EF 55-60%, otherwise I just need to work with normal. [id number] - 1 (HTN). He was started on 5 mg PO Apixaban BID and will remain on anticoagulation for 3 months, and continued Metoprolol 50 mg PO BID. His Ziopatch showed no sustained afib - really only showed 3 runs of NSVT that did not correlate to patient's symptoms of palpitations (very short runs).    Patient is doing well .  He did suffer an accidental fall which required some stitches on the film it has healed up well.  Had no recurrence of symptomatic atrial fibrillation.  We discussed follow-up and assuming he has no symptom recurrences will likely be able to discontinue his anticoagulation and a couple months time.  He will contact us and let us know if he is having any symptoms recurrences.  Absent any change in the health picture plan to see him in the clinic again in about 1 year    Please do not hesitate to contact my office if you have any questions or concerns.      Tr Hendrickson MD  Cardiac Arrhythmia Service  [facility]  [phone]      CC  RACHELE,  KOFI SERRANO

## 2018-09-06 NOTE — MR AVS SNAPSHOT
After Visit Summary   9/6/2018    Manjinder Esparza    MRN: [id number]           Patient Information     Date Of Birth          1965        Visit Information        Provider Department      9/6/2018 4:30 PM Tr Hendrickson MD [facility]        Today's Diagnoses     Paroxysmal atrial fibrillation (H)    -  1    New onset a-fib (H)           Follow-ups after your visit        Who to contact     If you have questions or need follow up information about today's clinic visit or your schedule please contact [facility] directly at [phone].  Normal or non-critical lab and imaging results will be communicated to you by Bebitoshart, letter or phone within 4 business days after the clinic has received the results. If you do not hear from us within 7 days, please contact the clinic through [employer] or phone. If you have a critical or abnormal lab result, we will notify you by phone as soon as possible.  Submit refill requests through [employer] or call your pharmacy and they will forward the refill request to us. Please allow 3 business days for your refill to be completed.          Additional Information About Your Visit        MyChart Information     [employer] gives you secure access to your electronic health record. If you see a primary care provider, you can also send messages to your care team and make appointments. If you have questions, please call your primary care clinic.  If you do not have a primary care provider, please call [phone] and they will assist you.        Care EveryWhere ID     This is your Care EveryWhere ID. This could be used by other organizations to access your [place] medical records  [id number]        Your Vitals Were     Pulse Height Pulse Oximetry BMI (Body Mass Index)          64 1.829 m (6') 98% 25.5 kg/m2         Blood Pressure from Last 3 Encounters:   09/06/18 (!) 144/96   09/01/18 (!) 133/103   07/19/18 135/89    Weight from Last 3 Encounters:    09/06/18 85.3 kg (188 lb)   09/01/18 86.2 kg (190 lb)   07/19/18 83.5 kg (184 lb)              We Performed the Following     EKG 12-lead, tracing only (Same Day)     Follow-Up with Electrophysiologist - 3 Months        Primary Care Provider Office Phone # Fax #    JOHNNY Kirkland [phone] [address] [place] [address]        Equal Access to Services     AMY ASTUDILLO : Hadii aad ku hadasho Soomaali, waaxda luqadaha, qaybta kaalmada adeegyada, waxay idiin hayaan adeeg khfrankie beckford . So [facility] [phone].    ATENCIÓN: Si habla español, tiene a hastings disposición servicios gratuitos de asistencia lingüística. LlKettering Health [facility] [phone].    We comply with applicable federal civil rights laws and [place] laws. We do not discriminate on the basis of race, color, national origin, age, disability, sex, sexual orientation, or gender identity.            Thank you!     Thank you for choosing [facility]  for your care. Our goal is always to provide you with excellent care. Hearing back from our patients is one way we can continue to improve our services. Please take a few minutes to complete the written survey that you may receive in the mail after your visit with us. Thank you!             Your Updated Medication List - Protect others around you: Learn how to safely use, store and throw away your medicines at www.disposemymeds.org.          This list is accurate as of 9/6/18 11:59 PM.  Always use your most recent med list.                   Brand Name Dispense Instructions for use Diagnosis    apixaban ANTICOAGULANT 5 MG tablet    ELIQUIS    60 tablet    Take 1 tablet (5 mg) by mouth 2 times daily    New onset a-fib (H)       cephALEXin 500 MG capsule    KEFLEX    28 capsule    Take 1 capsule (500 mg) by mouth 4 times daily for 7 days        metoprolol succinate 50 MG 24 hr tablet    TOPROL-XL    30 tablet    Take 1 tablet (50 mg) by mouth daily    HTN  (hypertension), Paroxysmal supraventricular tachycardia (H)       metoprolol tartrate 50 MG tablet    LOPRESSOR

## 2018-09-06 NOTE — NURSING NOTE
Chief Complaint   Patient presents with     Follow Up For     Emeka, 3 month Follow-up (7/12/18)     Medications reviewed and vitals and EKG performed.   Jessica Ramos, CMA

## 2018-09-06 NOTE — MR AVS SNAPSHOT
After Visit Summary   9/6/2018    Manjinder Esparza    MRN: [id number]           Patient Information     Date Of Birth          1965        Visit Information        Provider Department      9/6/2018 10:00 AM HW RN/TRIAGE NURSE ONLY [facility]        Today's Diagnoses     Encounter for removal of sutures    -  1       Follow-ups after your visit        Your next 10 appointments already scheduled     Sep 06, 2018  4:30 PM CDT   (Arrive by 4:15 PM)   RETURN ATRIAL FIBULATION VISIT with Tr Hendrickson MD   [facility])    [address] [place] [address]  [address] [place] [address] [phone]              Who to contact     If you have questions or need follow up information about today's clinic visit or your schedule please contact [facility] directly at [phone].  Normal or non-critical lab and imaging results will be communicated to you by MyChart, letter or phone within 4 business days after the clinic has received the results. If you do not hear from us within 7 days, please contact the clinic through [employer] or phone. If you have a critical or abnormal lab result, we will notify you by phone as soon as possible.  Submit refill requests through "[employer] or call your pharmacy and they will forward the refill request to us. Please allow 3 business days for your refill to be completed.          Additional Information About Your Visit        MyChart Information     "[employer] gives you secure access to your electronic health record. If you see a primary care provider, you can also send messages to your care team and make appointments. If you have questions, please call your primary care clinic.  If you do not have a primary care provider, please call [phone] and they will assist you.        Care EveryWhere ID     This is your Care EveryWhere ID. This could be used by other organizations to access your [place]  medical records  [id number]         Blood Pressure from Last 3 Encounters:   09/01/18 (!) 133/103   07/19/18 135/89   07/17/18 120/69    Weight from Last 3 Encounters:   09/01/18 190 lb (86.2 kg)   07/19/18 184 lb (83.5 kg)   07/16/18 180 lb 11.2 oz (82 kg)              Today, you had the following     No orders found for display       Primary Care Provider Office Phone # Fax #    Liat SILVER Jhoan, JOHNNY [facility] [phone] [address] [place] [address]        Equal Access to Services     [facility] : Hadii irasema christinao Soatul, waaxda luqadaha, qaybta kaalmada adeashishyada, tea beckford . So [facility] [phone].    ATENCIÓN: Si habla español, tiene a hastings disposición servicios gratuitos de asistencia lingüística. Llame al [phone].    We comply with applicable federal civil rights laws and [place] laws. We do not discriminate on the basis of race, color, national origin, age, disability, sex, sexual orientation, or gender identity.            Thank you!     Thank you for choosing [facility]  for your care. Our goal is always to provide you with excellent care. Hearing back from our patients is one way we can continue to improve our services. Please take a few minutes to complete the written survey that you may receive in the mail after your visit with us. Thank you!             Your Updated Medication List - Protect others around you: Learn how to safely use, store and throw away your medicines at www.disposemymeds.org.          This list is accurate as of 9/6/18 11:07 AM.  Always use your most recent med list.                   Brand Name Dispense Instructions for use Diagnosis    apixaban ANTICOAGULANT 5 MG tablet    ELIQUIS    60 tablet    Take 1 tablet (5 mg) by mouth 2 times daily    New onset a-fib (H)       cephALEXin 500 MG capsule    KEFLEX    28 capsule    Take 1 capsule (500 mg) by mouth 4 times daily for 7 days         metoprolol succinate 50 MG 24 hr tablet    TOPROL-XL    30 tablet    Take 1 tablet (50 mg) by mouth daily    HTN (hypertension), Paroxysmal supraventricular tachycardia (H)

## 2018-09-07 LAB — INTERPRETATION ECG - MUSE: NORMAL

## 2018-09-21 ENCOUNTER — MYC MEDICAL ADVICE (OUTPATIENT)
Dept: FAMILY MEDICINE | Facility: CLINIC | Age: 53
End: 2018-09-21

## 2018-09-27 NOTE — TELEPHONE ENCOUNTER
Faxed form to number that was provided on the form.   Made copy of form incase pt decides to come in and signs the form. Placed form in CO's faxed bin.   Mailed original form to patient.         Rebecca Acosta MA

## 2018-09-28 ENCOUNTER — PRE VISIT (OUTPATIENT)
Dept: SLEEP MEDICINE | Facility: CLINIC | Age: 53
End: 2018-09-28

## 2018-09-28 NOTE — TELEPHONE ENCOUNTER
"  1.  Reason for the visit:  Would like to discuss other options besides cpap  2.  Referring provider and clinic name:  Self  3.  Previous Sleep Doctor or Pulmonlogist (clinic name)?  Pt previously has sleep study done with [facility].  No notes in Epic possible notes in Allscripts  4.  Records, Procedures, Imaging, and Labs (see below)  Possible records in all scripts        All NOTES from previous office visits that pertain to why they are being seen in the Sleep Center    Previous Sleep Studies, Chest CT, Echos and reports that pertain to why they are seeing Sleep Center    All Sleep records that have been done in the last 2 years that pertain to why they are seeing Sleep Center            Are they being seen for continuation of care for Cpap/Bipap/Avap/Trilogy/Dental Device? Yes.  Not sure if pt is currently using message was left asking pt to arrive 30 mins early and to bring cpap    If yes to above Who and Where was Device issued/currently getting supplies from?     Are you currently on \"Supplemental Oxygen\" during the day or night?                                                                                                                                                         Please remind pt to bring Cpap machine and ask to arrive 15 minutes early to appointment due traffic and congestion                                                 5. Pt Sleep Center Packet received Message left asking pt to arrive 30 minutes early to appointment if no packet received.        Yes: \"please make sure that you bring this to your appointment completed, either the doctor will not see you until this completed or you may be asked to reschedule your appointment.\"     No: mail or email to the pt and explain, \"please make sure that you bring this to your appointment completed, either the doctor will not see you until this completed or you may be asked to reschedule your appointment.\"     ~If pt coming early to fill " "packet out, ask that they come 30 minutes prior to their appointment~     6. Has the pt's medication list been updated and preferred pharmacy added?     7. Has the allergy list been reviewed?    \"Thank you for choosing [facility] and we look forward to seeing you at your upcoming appointment\"     "

## 2018-10-01 ENCOUNTER — HOSPITAL ENCOUNTER (EMERGENCY)
Facility: CLINIC | Age: 53
Discharge: HOME | End: 2018-10-01
Attending: EMERGENCY MEDICINE | Admitting: EMERGENCY MEDICINE
Payer: COMMERCIAL

## 2018-10-01 ENCOUNTER — MYC MEDICAL ADVICE (OUTPATIENT)
Dept: FAMILY MEDICINE | Facility: CLINIC | Age: 53
End: 2018-10-01

## 2018-10-01 ENCOUNTER — APPOINTMENT (OUTPATIENT)
Dept: GENERAL RADIOLOGY | Facility: CLINIC | Age: 53
End: 2018-10-01
Attending: EMERGENCY MEDICINE
Payer: COMMERCIAL

## 2018-10-01 VITALS
RESPIRATION RATE: 16 BRPM | WEIGHT: 194.2 LBS | SYSTOLIC BLOOD PRESSURE: 155 MMHG | OXYGEN SATURATION: 100 % | BODY MASS INDEX: 26.3 KG/M2 | DIASTOLIC BLOOD PRESSURE: 99 MMHG | HEIGHT: 72 IN | TEMPERATURE: 97.9 F

## 2018-10-01 DIAGNOSIS — R07.9 CHEST PAIN, UNSPECIFIED TYPE: ICD-10-CM

## 2018-10-01 LAB
ANION GAP SERPL CALCULATED.3IONS-SCNC: 5 MMOL/L (ref 3–14)
BASOPHILS # BLD AUTO: 0.1 10E9/L (ref 0–0.2)
BASOPHILS NFR BLD AUTO: 1 %
BUN SERPL-MCNC: 17 MG/DL (ref 7–30)
CALCIUM SERPL-MCNC: 8.6 MG/DL (ref 8.5–10.1)
CHLORIDE SERPL-SCNC: 110 MMOL/L (ref 94–109)
CO2 SERPL-SCNC: 27 MMOL/L (ref 20–32)
CREAT SERPL-MCNC: 1.03 MG/DL (ref 0.66–1.25)
DIFFERENTIAL METHOD BLD: NORMAL
EOSINOPHIL # BLD AUTO: 0.2 10E9/L (ref 0–0.7)
EOSINOPHIL NFR BLD AUTO: 3 %
ERYTHROCYTE [DISTWIDTH] IN BLOOD BY AUTOMATED COUNT: 12.7 % (ref 10–15)
GFR SERPL CREATININE-BSD FRML MDRD: 76 ML/MIN/1.7M2
GLUCOSE SERPL-MCNC: 113 MG/DL (ref 70–99)
HCT VFR BLD AUTO: 40.7 % (ref 40–53)
HGB BLD-MCNC: 14.2 G/DL (ref 13.3–17.7)
IMM GRANULOCYTES # BLD: 0 10E9/L (ref 0–0.4)
IMM GRANULOCYTES NFR BLD: 0.3 %
INTERPRETATION ECG - MUSE: NORMAL
LYMPHOCYTES # BLD AUTO: 1.8 10E9/L (ref 0.8–5.3)
LYMPHOCYTES NFR BLD AUTO: 26.5 %
MCH RBC QN AUTO: 31.3 PG (ref 26.5–33)
MCHC RBC AUTO-ENTMCNC: 34.9 G/DL (ref 31.5–36.5)
MCV RBC AUTO: 90 FL (ref 78–100)
MONOCYTES # BLD AUTO: 0.9 10E9/L (ref 0–1.3)
MONOCYTES NFR BLD AUTO: 12.8 %
NEUTROPHILS # BLD AUTO: 3.9 10E9/L (ref 1.6–8.3)
NEUTROPHILS NFR BLD AUTO: 56.4 %
NRBC # BLD AUTO: 0 10*3/UL
NRBC BLD AUTO-RTO: 0 /100
PLATELET # BLD AUTO: 168 10E9/L (ref 150–450)
POTASSIUM SERPL-SCNC: 4 MMOL/L (ref 3.4–5.3)
RBC # BLD AUTO: 4.54 10E12/L (ref 4.4–5.9)
SODIUM SERPL-SCNC: 142 MMOL/L (ref 133–144)
TROPONIN I SERPL-MCNC: <0.015 UG/L (ref 0–0.04)
WBC # BLD AUTO: 6.9 10E9/L (ref 4–11)

## 2018-10-01 PROCEDURE — 93005 ELECTROCARDIOGRAM TRACING: CPT

## 2018-10-01 PROCEDURE — 99285 EMERGENCY DEPT VISIT HI MDM: CPT | Mod: 25

## 2018-10-01 PROCEDURE — 71046 X-RAY EXAM CHEST 2 VIEWS: CPT

## 2018-10-01 PROCEDURE — 84484 ASSAY OF TROPONIN QUANT: CPT | Performed by: EMERGENCY MEDICINE

## 2018-10-01 PROCEDURE — 80048 BASIC METABOLIC PNL TOTAL CA: CPT | Performed by: EMERGENCY MEDICINE

## 2018-10-01 PROCEDURE — 85025 COMPLETE CBC W/AUTO DIFF WBC: CPT | Performed by: EMERGENCY MEDICINE

## 2018-10-01 ASSESSMENT — ENCOUNTER SYMPTOMS
ABDOMINAL PAIN: 0
COUGH: 0
ACTIVITY CHANGE: 1
FATIGUE: 1
FEVER: 0
SHORTNESS OF BREATH: 0
PALPITATIONS: 1

## 2018-10-01 NOTE — ED AVS SNAPSHOT
Emergency Department    [address] [place] [address]    Phone:  [phone]    Fax:  [phone]                                       Manjinder Esparza   MRN: [id number]    Department:   Emergency Department   Date of Visit:  10/1/2018           After Visit Summary Signature Page     I have received my discharge instructions, and my questions have been answered. I have discussed any challenges I see with this plan with the nurse or doctor.    ..........................................................................................................................................  Patient/Patient Representative Signature      ..........................................................................................................................................  Patient Representative Print Name and Relationship to Patient    ..................................................               ................................................  Date                                   Time    ..........................................................................................................................................  Reviewed by Signature/Title    ...................................................              ..............................................  Date                                               Time          22EPIC Rev 08/18

## 2018-10-01 NOTE — ED AVS SNAPSHOT
Emergency Department    [address] [facility] [address]    Phone:  [phone]    Fax:  [phone]                                       Manjinder Esparza   MRN: [id number]    Department:   Emergency Department   Date of Visit:  10/1/2018           Patient Information     Date Of Birth          1965        Your diagnoses for this visit were:     Chest pain, unspecified type        You were seen by Cristobal Altman MD.      Follow-up Information     Follow up with Liat Staples APRN CNP In 1 week.    Specialty:  Nurse Practitioner    Contact information:    [address] [place] [id number] [phone]          Follow up with  Emergency Department.    Specialty:  EMERGENCY MEDICINE    Why:  As needed, If symptoms worsen    Contact information:    [address] [facility] [id number] [phone]        Discharge Instructions       Discharge Instructions  Chest Pain    You have been seen today for chest pain or discomfort.  At this time, your provider has found no signs that your chest pain is due to a serious or life-threatening condition, (or you have declined more testing and/or admission to the hospital). However, sometimes there is a serious problem that does not show up right away. Your evaluation today may not be complete and you may need further testing and evaluation.     Generally, every Emergency Department visit should have a follow-up clinic visit with either a primary or a specialty clinic/provider. Please follow-up as instructed by your emergency provider today.  Return to the Emergency Department if:    Your chest pain changes, gets worse, starts to happen more often, or comes with less activity.    You are newly short of breath.    You get very weak or tired.    You pass out or faint.    You have any new symptoms, like fever, cough, numb legs, or you cough up blood.    You have anything else that worries you.    Until you  follow-up with your regular provider, please do the following:    Take one aspirin daily unless you have an allergy or are told not to by your provider.    If a stress test appointment has been made, go to the appointment.    If you have questions, contact your regular provider.    Follow-up with your regular provider/clinic as directed; this is very important.    If you were given a prescription for medicine here today, be sure to read all of the information (including the package insert) that comes with your prescription.  This will include important information about the medicine, its side effects, and any warnings that you need to know about.  The pharmacist who fills the prescription can provide more information and answer questions you may have about the medicine.  If you have questions or concerns that the pharmacist cannot address, please call or return to the Emergency Department.       Remember that you can always come back to the Emergency Department if you are not able to see your regular provider in the amount of time listed above, if you get any new symptoms, or if there is anything that worries you.    Your next 10 appointments already scheduled     Oct 02, 2018  2:00 PM CDT   New Sleep Patient with JOHNNY Cuenca CNP   [facility] ([facility])    [address] [place] [id number] [phone]              24 Hour Appointment Hotline       To make an appointment at any [facility] clinic, call [phone]). If you don't have a family doctor or clinic, we will help you find one. [facility] clinics are conveniently located to serve the needs of you and your family.             Review of your medicines      Our records show that you are taking the medicines listed below. If these are incorrect, please call your family doctor or clinic.        Dose / Directions Last dose taken    apixaban  ANTICOAGULANT 5 MG tablet   Commonly known as:  ELIQUIS   Dose:  5 mg   Quantity:  60 tablet        Take 1 tablet (5 mg) by mouth 2 times daily   Refills:  2        metoprolol succinate 50 MG 24 hr tablet   Commonly known as:  TOPROL-XL   Dose:  50 mg   Quantity:  30 tablet        Take 1 tablet (50 mg) by mouth daily   Refills:  11        metoprolol tartrate 50 MG tablet   Commonly known as:  LOPRESSOR        Refills:  0        ZANTAC PO        Refills:  0                Procedures and tests performed during your visit     Basic metabolic panel    CBC with platelets differential    EKG 12 lead    Peripheral IV: Standard    Pulse oximetry nursing    Troponin I    XR Chest 2 Views      Orders Needing Specimen Collection     None      Pending Results     Date and Time Order Name Status Description    10/1/2018 1413 XR Chest 2 Views Preliminary             Pending Culture Results     No orders found from 9/29/2018 to 10/2/2018.            Pending Results Instructions     If you had any lab results that were not finalized at the time of your Discharge, you can call the ED Lab Result RN at [phone]. You will be contacted by this team for any positive Lab results or changes in treatment. The nurses are available 7 days a week from 10A to 6:30P.  You can leave a message 24 hours per day and they will return your call.        Test Results From Your Hospital Stay        10/1/2018  2:30 PM      Component Results     Component Value Ref Range & Units Status    WBC 6.9 4.0 - 11.0 10e9/L Final    RBC Count 4.54 4.4 - 5.9 10e12/L Final    Hemoglobin 14.2 13.3 - 17.7 g/dL Final    Hematocrit 40.7 40.0 - 53.0 % Final    MCV 90 78 - 100 fl Final    MCH 31.3 26.5 - 33.0 pg Final    MCHC 34.9 31.5 - 36.5 g/dL Final    RDW 12.7 10.0 - 15.0 % Final    Platelet Count 168 150 - 450 10e9/L Final    Diff Method Automated Method  Final    % Neutrophils 56.4 % Final    % Lymphocytes 26.5 % Final    % Monocytes 12.8 % Final    % Eosinophils  3.0 % Final    % Basophils 1.0 % Final    % Immature Granulocytes 0.3 % Final    Nucleated RBCs 0 0 /100 Final    Absolute Neutrophil 3.9 1.6 - 8.3 10e9/L Final    Absolute Lymphocytes 1.8 0.8 - 5.3 10e9/L Final    Absolute Monocytes 0.9 0.0 - 1.3 10e9/L Final    Absolute Eosinophils 0.2 0.0 - 0.7 10e9/L Final    Absolute Basophils 0.1 0.0 - 0.2 10e9/L Final    Abs Immature Granulocytes 0.0 0 - 0.4 10e9/L Final    Absolute Nucleated RBC 0.0  Final         10/1/2018  2:51 PM      Component Results     Component Value Ref Range & Units Status    Sodium 142 133 - 144 mmol/L Final    Potassium 4.0 3.4 - 5.3 mmol/L Final    Chloride 110 (H) 94 - 109 mmol/L Final    Carbon Dioxide 27 20 - 32 mmol/L Final    Anion Gap 5 3 - 14 mmol/L Final    Glucose 113 (H) 70 - 99 mg/dL Final    Urea Nitrogen 17 7 - 30 mg/dL Final    Creatinine 1.03 0.66 - 1.25 mg/dL Final    GFR Estimate 76 >60 mL/min/1.7m2 Final    Non  GFR Calc    GFR Estimate If Black >90 >60 mL/min/1.7m2 Final    African American GFR Calc    Calcium 8.6 8.5 - 10.1 mg/dL Final         10/1/2018  2:53 PM      Component Results     Component Value Ref Range & Units Status    Troponin I ES <0.015 0.000 - 0.045 ug/L Final    The 99th percentile for upper reference range is 0.045 ug/L.  Troponin values   in the range of 0.045 - 0.120 ug/L may be associated with risks of adverse   clinical events.           10/1/2018  3:46 PM      Narrative     CHEST TWO VIEWS  10/1/2018 3:19 PM     HISTORY: Chest pain.      COMPARISON: 7/16/2018    FINDINGS: Mild aortic tortuosity again seen. Old right rib fractures.        Impression     IMPRESSION:  No acute pulmonary disease.                Clinical Quality Measure: Blood Pressure Screening     Your blood pressure was checked while you were in the emergency department today. The last reading we obtained was  BP: (!) 163/104 . Please read the guidelines below about what these numbers mean and what you should do about  them.  If your systolic blood pressure (the top number) is less than 120 and your diastolic blood pressure (the bottom number) is less than 80, then your blood pressure is normal. There is nothing more that you need to do about it.  If your systolic blood pressure (the top number) is 120-139 or your diastolic blood pressure (the bottom number) is 80-89, your blood pressure may be higher than it should be. You should have your blood pressure rechecked within a year by a primary care provider.  If your systolic blood pressure (the top number) is 140 or greater or your diastolic blood pressure (the bottom number) is 90 or greater, you may have high blood pressure. High blood pressure is treatable, but if left untreated over time it can put you at risk for heart attack, stroke, or kidney failure. You should have your blood pressure rechecked by a primary care provider within the next 4 weeks.  If your provider in the emergency department today gave you specific instructions to follow-up with your doctor or provider even sooner than that, you should follow that instruction and not wait for up to 4 weeks for your follow-up visit.        Thank you for choosing [facility]       Thank you for choosing [facility] for your care. Our goal is always to provide you with excellent care. Hearing back from our patients is one way we can continue to improve our services. Please take a few minutes to complete the written survey that you may receive in the mail after you visit with us. Thank you!        [facility] Information     [employer] gives you secure access to your electronic health record. If you see a primary care provider, you can also send messages to your care team and make appointments. If you have questions, please call your primary care clinic.  If you do not have a primary care provider, please call [phone] and they will assist you.        Care EveryWhere ID     This is your Care EveryWhere ID. This could be used by other  organizations to access your [facility] medical records  [id number]        Equal Access to Services     AMY ASTUDILLO : Celine Keenan, bailee batista, tea cast. So [facility] [phone].    ATENCIÓN: Si habla español, tiene a hastings disposición servicios gratuitos de asistencia lingüística. Llame al [phone].    We comply with applicable federal civil rights laws and [place] laws. We do not discriminate on the basis of race, color, national origin, age, disability, sex, sexual orientation, or gender identity.            After Visit Summary       This is your record. Keep this with you and show to your community pharmacist(s) and doctor(s) at your next visit.

## 2018-10-01 NOTE — ED PROVIDER NOTES
"  History     Chief Complaint:  Chest pain    HPI   Manjinder Esparza is a 53 year old male with a history of hypertension who presents with chest pain.  The patient has a history notable for a recent episode of a-fib in July 2018 following which he underwent cardioversion. Due to CHADS-VASC criteria, the patient was started on Eliquis with scheduled follow up to evaluate at the end of October to determine continuation of this medication. Since that time, the patient has not had further known episodes of a-fib. The patient states that today he did a much more intense work out than usual today and around 0930 began experiencing a left lateral chest wall/rib pain. Along with this he had a \"coldness\" in his left arm that he states he had with his previous A-fib, as well as a few brief episodes of a fluttering in his chest. He was more tired then expected after his exercise and given his constellation of symptoms he called his cardiology clinic today and was subsequently referred here. The patient notes that his chest pain is nearly resolved and is more of a mild ache at this point. He denies any injury or falls. He otherwise denies fevers, shortness of breath, cough, nausea, or vomiting.  ARMANDO HAY MD    Allergies:  Lisinopril      Medications:    Toprol-XL  Lopressor  Zantac  Eliquis    Past Medical History:    Atrial fibrillation  Hypertension  GERD    Past Surgical History:    Cardioversion  Bone graft left lower jaw     Family History:    Diabetes, hypertension, colorectal cancer, prostate cancer     Social History:  Smoking Status: Never Smoker  Marital Status:  Single      Review of Systems   Constitutional: Positive for activity change and fatigue. Negative for fever.   Respiratory: Negative for cough and shortness of breath.    Cardiovascular: Positive for chest pain and palpitations. Negative for leg swelling.   Gastrointestinal: Negative for abdominal pain.   All other systems reviewed and are " negative.      Physical Exam   Patient Vitals for the past 24 hrs:   BP Temp Temp src Heart Rate Resp SpO2 Height Weight   10/01/18 1608 (!) 155/99 - - - - - - -   10/01/18 1415 - - - 63 12 - - -   10/01/18 1334 (!) 163/104 97.9  F (36.6  C) Oral 66 16 99 % 1.829 m (6') 88.1 kg (194 lb 3.2 oz)          Physical Exam  General: Alert, interactive   Head:  Scalp is atraumatic  Eyes:  The pupils are equal, round, and reactive to light    EOM's intact    No scleral icterus  ENT:      Nose:  The external nose is normal  Ears:  External ears are normal  Mouth/Throat: The oropharynx is normal    Mucus membranes are moist       Neck:  Normal range of motion.      There is no rigidity.    Trachea is in the midline         CV:  Regular rate and rhythm    No murmur     2+ pulses in all four extremities  Resp:  Breath sounds are clear bilaterally    Non-labored, no retractions or accessory muscle use      GI:  Abdomen is soft, no distension, no tenderness.       MS:  Normal strength in all 4 extremities    No peripheral edema.  Skin:  Warm and dry, No rash or lesions noted.  Neuro: Strength 5/5 x4.  Sensation intact  In all 4 extremities.        Psych:  Awake. Alert.  Normal affect.      Appropriate interactions.    Emergency Department Course     ECG (13:31:40):  Rate 68 bpm. FL interval 202. QRS duration 88. QT/QTc 386/410. P-R-T axes 42 45 16. Normal sinus rhythm. Septal infarct, age undetermined. Abnormal ECG. No significant change when compared to EKG dated 9/6/18.  Interpreted at 1412 by TriggerCristobal MD.     Imaging:  Radiographic findings were communicated with the patient who voiced understanding of the findings.    X-ray Chest, 2 views:  No acute pulmonary disease.  Result per radiology.     Laboratory:  CBC: WNL (WBC 6.9, HGB 14.2, )   BMP: Glucose 113 (H), Chloride 110 (H), o/w WNL (Creatinine 1.03)   1416 - Troponin: <0.015     Emergency Department Course:  Past medical records, nursing notes, and  vitals reviewed.  1402: I performed an exam of the patient and obtained history, as documented above.   IV inserted and blood drawn. The patient was placed on continuous cardiac monitoring and pulse oximetry.     The patient was sent for a X-ray while in the emergency department, findings above.     1610: I rechecked the patient. Findings and plan explained to the Patient. Patient discharged home with instructions regarding supportive care, medications, and reasons to return. The importance of close follow-up was reviewed.        Impression & Plan      Medical Decision Making:  Manjinder Esparza is a 53 year old male who was seen and evaluated. Previous records were reviewed and the above work up was undertaken. He has a non-ischemic EKG and negative troponin, despite over 6 hours of chest pain. I doubt acute coronary syndrome, especially in light of his normal CT-angiogram within the last two months. Given his use of Eliquis, I doubt pulmonary embolism. Findings are not consistent with aortic dissection. Chest Radiograph demonstrates no signs of pneumonia or pneumothorax. At this point I believe he can safely be discharged to home with close follow up with his PCP and cardiologist, and return if new symptoms develop. The patient was in agreement with this plan and subsequently discharged.     Plan:  Discharge home as noted above.    Diagnosis:    ICD-10-CM    1. Chest pain, unspecified type R07.9      Matthew Wick  10/1/2018    EMERGENCY DEPARTMENT  IMatthew, am serving as a scribe at 2:01 PM on 10/1/2018 to document services personally performed by Cristobal Altman MD based on my observations and the provider's statements to me.       Cristobal Altman MD  10/01/18 1720

## 2018-10-01 NOTE — TELEPHONE ENCOUNTER
My chart message received from patient         Subject: Do I need an appointment?    Wondering if I need an appointment to stop by and get an EKG today?   I worked out hard this a.m. and feel a bit out of sorts similar to what I felt like when I first went into AFIB.    Can Urgent care do that for me?    Lee NIEVES called patient   Patient states that he has:   1. Heart burn   2. History of AFIB - verified he did take his eliquis today and metoprolol   3. Left sided chest pain that comes and goes (starting after working out this am)   4. Denies shortness of breath     RN recommended patient be seen in ER now for evaluation (patient has a meeting at 4:00 and was reluctant to go to the ER - however stated that he would proceed with this plan ER)     Rocio Antony Registered Nurse   [facility]

## 2018-10-01 NOTE — DISCHARGE INSTRUCTIONS
Discharge Instructions  Chest Pain    You have been seen today for chest pain or discomfort.  At this time, your provider has found no signs that your chest pain is due to a serious or life-threatening condition, (or you have declined more testing and/or admission to the hospital). However, sometimes there is a serious problem that does not show up right away. Your evaluation today may not be complete and you may need further testing and evaluation.     Generally, every Emergency Department visit should have a follow-up clinic visit with either a primary or a specialty clinic/provider. Please follow-up as instructed by your emergency provider today.  Return to the Emergency Department if:    Your chest pain changes, gets worse, starts to happen more often, or comes with less activity.    You are newly short of breath.    You get very weak or tired.    You pass out or faint.    You have any new symptoms, like fever, cough, numb legs, or you cough up blood.    You have anything else that worries you.    Until you follow-up with your regular provider, please do the following:    Take one aspirin daily unless you have an allergy or are told not to by your provider.    If a stress test appointment has been made, go to the appointment.    If you have questions, contact your regular provider.    Follow-up with your regular provider/clinic as directed; this is very important.    If you were given a prescription for medicine here today, be sure to read all of the information (including the package insert) that comes with your prescription.  This will include important information about the medicine, its side effects, and any warnings that you need to know about.  The pharmacist who fills the prescription can provide more information and answer questions you may have about the medicine.  If you have questions or concerns that the pharmacist cannot address, please call or return to the Emergency Department.       Remember that  The patient is a 89y Female complaining of you can always come back to the Emergency Department if you are not able to see your regular provider in the amount of time listed above, if you get any new symptoms, or if there is anything that worries you.

## 2018-10-02 ENCOUNTER — OFFICE VISIT (OUTPATIENT)
Dept: SLEEP MEDICINE | Facility: CLINIC | Age: 53
End: 2018-10-02
Payer: COMMERCIAL

## 2018-10-02 VITALS
DIASTOLIC BLOOD PRESSURE: 89 MMHG | HEIGHT: 72 IN | OXYGEN SATURATION: 97 % | WEIGHT: 193 LBS | RESPIRATION RATE: 16 BRPM | SYSTOLIC BLOOD PRESSURE: 153 MMHG | HEART RATE: 69 BPM | BODY MASS INDEX: 26.14 KG/M2

## 2018-10-02 DIAGNOSIS — G47.8 UPPER AIRWAY RESISTANCE SYNDROME: Primary | ICD-10-CM

## 2018-10-02 DIAGNOSIS — F51.04 PSYCHOPHYSIOLOGICAL INSOMNIA: ICD-10-CM

## 2018-10-02 DIAGNOSIS — G47.8 UNHEALTHY SLEEP HABIT: ICD-10-CM

## 2018-10-02 PROCEDURE — 99215 OFFICE O/P EST HI 40 MIN: CPT | Performed by: NURSE PRACTITIONER

## 2018-10-02 RX ORDER — ZOLPIDEM TARTRATE 10 MG/1
TABLET ORAL
Qty: 1 TABLET | Refills: 0 | Status: SHIPPED | OUTPATIENT
Start: 2018-10-02 | End: 2018-11-29

## 2018-10-02 NOTE — NURSING NOTE
[place] given to patient via email.    Emily Coreas [facility]

## 2018-10-02 NOTE — MR AVS SNAPSHOT
After Visit Summary   10/2/2018    Manjinder Esparza    MRN: [id number]           Patient Information     Date Of Birth          1965        Visit Information        Provider Department      10/2/2018 2:00 PM Niru Montilla APRN CNP [facility]        Today's Diagnoses     Snoring    -  1    Psychophysiological insomnia          Care Instructions    MY TREATMENT INFORMATION FOR SLEEP APNEA-  Manjinder Esparza    NP : Niru Montilla  SLEEP CENTER :  [place]    MY CONTACT NUMBER: [phone]    If you need to cancel your sleep study, please call as soon as possible.  If you were prescribed a sleep aid, bring that to the sleep lab.    Please remember: do not drive if sleepy    Dr. ADOLFO  Avoid alcohol within 3-4 hrs of bedtime  Avoid clock watching  Sleep diaries:    To me                                                                                                                                                                                                                                                                                                                                                                                                                                                                                                                                                                                                                                                                                                                                                                                                                                                                                                                                                                                                                                                                                                                                                            "                                                                                                                                                                                                                                                                Am I having a sleep study at a sleep center?  Make sure you have an appointment for the study before you leave!    Am I having a home sleep study?  Watch this video:  [email or web address]/watch?v=[email or web address]_[email or web address]&[email or web address]  Please verify your insurance coverage with your insurance carrier    Frequently asked questions:  1. What is Obstructive Sleep Apnea (CAIO)? CAIO is the most common type of sleep apnea. Apnea means, \"without breath.\"  Apnea is most often caused by narrowing or collapse of the upper airway as muscles relax during sleep.   Almost everyone has occasional apneas. Most people with sleep apnea have had brief interruptions at night frequently for many years.  The severity of sleep apnea is related to how frequent and severe the events are.   2. What are the consequences of CAIO? Symptoms include: feeling sleepy during the day, snoring loudly, gasping or stopping of breathing, trouble sleeping, and occasionally morning headaches or heartburn at night.  Sleepiness can be serious and even increase the risk of falling asleep while driving. Other health consequences may include development of high blood pressure and other cardiovascular disease in persons who are susceptible. Untreated CAIO  can contribute to heart disease, stroke and diabetes.   3. What are the treatment options? In most situations, sleep apnea is a lifelong disease that must be managed with daily therapy. Medications are not effective for sleep apnea and surgery is generally not considered until other therapies have been tried. Your treatment is your choice . Continuous Positive Airway (CPAP) works right away and is the therapy that is effective in nearly everyone. " An oral device to hold your jaw forward is usually the next most reliable option. Other options include postioning devices (to keep you off your back), weight loss, and surgery including a tongue pacing device. There is more detail about some of these options below.    Important tips for using CPAP and similar devices   Know your equipment:  CPAP is continuous positive airway pressure that prevents obstructive sleep apnea by keeping the throat from collapsing while you are sleeping. In most cases, the device is  smart  and can slowly self-adjusts if your throat collapses and keeps a record every day of how well you are treated-this information is available to you and your care team.  BPAP is bilevel positive airway pressure that keeps your throat open and also assists each breath with a pressure boost to maintain adequate breathing.  Special kinds of BPAP are used in patients who have inadequate breathing from lung or heart disease. In most cases, the device is  smart  and can slowly self-adjusts to assist breathing. Like CPAP, the device keeps a record of how well you are treated.  Your mask is your connection to the device. You get to choose what feels most comfortable and the staff will help to make sure if fits. Here: are some examples of the different masks that are available:       Key points to remember on your journey with sleep apnea:  1. Sleep study.  PAP devices often need to be adjusted during a sleep study to show that they are effective and adjusted right.  2. Good tips to remember: Try wearing just the mask during a quiet time during the day so your body adapts to wearing it. A humidifier is recommended for comfort in most cases to prevent drying of your nose and throat. Allergy medication from your provider may help you if you are having nasal congestion.  3. Getting settled-in. It takes more than one night for most of us to get used to wearing a mask. Try wearing just the mask during a quiet time  during the day so your body adapts to wearing it. A humidifier is recommended for comfort in most cases. Our team will work with you carefully on the first day and will be in contact within 4 days and again at 2 and 4 weeks for advice and remote device adjustments. Your therapy is evaluated by the device each day.   4. Use it every night. The more you are able to sleep naturally for 7-8 hours, the more likely you will have good sleep and to prevent health risks or symptoms from sleep apnea. Even if you use it 4 hours it helps. Occasionally all of us are unable to use a medical therapy, in sleep apnea, it is not dangerous to miss one night.   5. Communicate. Call our skilled team on the number provided on the first day if your visit for problems that make it difficult to wear the device. Over 2 out of 3 patients can learn to wear the device long-term with help from our team. Remember to call our team or your sleep providers if you are unable to wear the device as we may have other solutions for those who cannot adapt to mask CPAP therapy. It is recommended that you sleep your sleep provider within the first 3 months and yearly after that if you are not having problems.   Take care of your equipment. Make sure you clean your mask and tubing using directions every day and that your filter and mask are replaced as recommended or if they are not working.     BESIDES CPAP, WHAT OTHER THERAPIES ARE THERE?    Positioning Device  Positioning devices are generally used when sleep apnea is mild and only occurs on your back.This example shows a pillow that straps around the waist. It may be appropriate for those whose sleep study shows milder sleep apnea that occurs primarily when lying flat on one's back. Preliminary studies have shown benefit but effectiveness at home may need to be verified by a home sleep test. These devices are generally not covered by medical insurance.  Examples of devices that maintain sleeping on the  back to prevent snoring and mild sleep apnea.    Belt type body positioner  Http://FindYogi.com/    Electronic reminder  Http://nightshifttherapy.com/  Http://www.[employer].au/    Oral Appliance  What is oral appliance therapy?  An oral appliance device fits on your teeth at night like a retainer used after having braces. The device is made by a specialized dentist and requires several visits over 1-2 months before a manufactured device is made to fit your teeth and is adjusted to prevent your sleep apnea. Once an oral device is working properly, snoring should be improved. A home sleep test may be recommended at that time if to determine whether the sleep apnea is adequately treated.       Some things to remember:  -Oral devices are often, but not always, covered by your medical insurance. Be sure to check with your insurance provider.   -If you are referred for oral therapy, you will be given a list of specialized dentists to consider or you may choose to visit the Web site of the American Academy of Dental Sleep Medicine  -Oral devices are less likely to work if you have severe sleep apnea or are extremely overweight.     More detailed information  An oral appliance is a small acrylic device that fits over the upper and lower teeth  (similar to a retainer or a mouth guard). This device slightly moves jaw forward, which moves the base of the tongue forward, opens the airway, improves breathing for effective treat snoring and obstructive sleep apnea in perhaps 7 out of 10 people .  The best working devices are custom-made by a dental device  after a mold is made of the teeth 1, 2, 3.  When is an oral appliance indicated?  Oral appliance therapy is recommended as a first-line treatment for patients with primary snoring, mild sleep apnea, and for patients with moderate sleep apnea who prefer appliance therapy to use of CPAP4, 5. Severity of sleep apnea is determined by sleep testing and is based on the  number of respiratory events per hour of sleep.   How successful is oral appliance therapy?  The success rate of oral appliance therapy in patients with mild sleep apnea is 75-80% while in patients with moderate sleep apnea it is 50-70%. The chance of success in patients with severe sleep apnea is 40-50%. The research also shows that oral appliances have a beneficial effect on the cardiovascular health of CAIO patients at the same magnitude as CPAP therapy7.  Oral appliances should be a second-line treatment in cases of severe sleep apnea, but if not completely successful then a combination therapy utilizing CPAP plus oral appliance therapy may be effective. Oral appliances tend to be effective in a broad range of patients although studies show that the patients who have the highest success are females, younger patients, those with milder disease, and less severe obesity. 3, 6.   Finding a dentist that practices dental sleep medicine  Specific training is available through the American Academy of Dental Sleep Medicine for dentists interested in working in the field of sleep. To find a dentist who is educated in the field of sleep and the use of oral appliances, near you, visit the Web site of the American Academy of Dental Sleep Medicine.    References  1. Carmella et al. Objectively measured vs self-reported compliance during oral appliance therapy for sleep-disordered breathing. Chest 2013; 144(5): 3613-7623.  2. Elissa et al. Objective measurement of compliance during oral appliance therapy for sleep-disordered breathing. Thorax 2013; 68(1): 91-96.  3. Gavin, et al. Mandibular advancement devices in 620 men and women with CAIO and snoring: tolerability and predictors of treatment success. Chest 2004; 125: 4720-0721.  4. Jak, et al. Oral appliances for snoring and CAIO: a review. Sleep 2006; 29: 244-262.  5. Gabriel et al. Oral appliance treatment for CAIO: an update. J Clin Sleep Med 2014; 10(2):  215-227.  6. stephania Caballero al. Predictors of OSAH treatment outcome. J Dent Res 2007; 86: 1993-1641.      Weight Loss:    Weight loss is a long-term strategy that may improve sleep apnea in some patients.    Weight management is a personal decision and the decision should be based on your interest and the potential benefits.  If you are interested in exploring weight loss strategies, the following discussion covers the impact on weight loss on sleep apnea and the approaches that may be successful.    Being overweight does not necessarily mean you will have health consequences.  Those who have BMI over 35 or over 27 with existing medical conditions carries greater risk.   Weight loss decreases severity of sleep apnea in most people with obesity. For those with mild obesity who have developed snoring with weight gain, even 15-30 pound weight loss can improve and occasionally eliminate sleep apnea.  Structured and life-long dietary and health habits are necessary to lose weight and keep healthier weight levels.     Though there may be significant health benefits from weight loss, long-term weight loss is very difficult to achieve- studies show success with dietary management in less than 10% of people. In addition, substantial weight loss may require years of dietary control and may be difficult if patients have severe obesity. In these cases, surgical management may be considered.  Finally, older individuals who have tolerated obesity without health complications may be less likely to benefit from weight loss strategies.      [unfilled]    Surgery:    Surgery for obstructive sleep apnea is considered generally only when other therapies fail to work. Surgery may be discussed with you if you are having a difficult time tolerating CPAP and or when there is an abnormal structure that requires surgical correction.  Nose and throat surgeries often enlarge the airway to prevent collapse.  Most of these surgeries create pain  for 1-2 weeks and up to half of the most common surgeries are not effective throughout life.  You should carefully discuss the benefits and drawbacks to surgery with your sleep provider and surgeon to determine if it is the best solution for you.   More information  Surgery for CAIO is directed at areas that are responsible for narrowing or complete obstruction of the airway during sleep.  There are a wide range of procedures available to enlarge and/or stabilize the airway to prevent blockage of breathing in the three major areas where it can occur: the palate, tongue, and nasal regions.  Successful surgical treatment depends on the accurate identification of the factors responsible for obstructive sleep apnea in each person.  A personalized approach is required because there is no single treatment that works well for everyone.  Because of anatomic variation, consultation with an examination by a sleep surgeon is a critical first step in determining what surgical options are best for each patient.  In some cases, examination during sedation may be recommended in order to guide the selection of procedures.  Patients will be counseled about risks and benefits as well as the typical recovery course after surgery. Surgery is typically not a cure for a person s CAIO.  However, surgery will often significantly improve one s CAIO severity (termed  success rate ).  Even in the absence of a cure, surgery will decrease the cardiovascular risk associated with OSA7; improve overall quality of life8 (sleepiness, functionality, sleep quality, etc).      Palate Procedures:  Patients with CAIO often have narrowing of their airway in the region of their tonsils and uvula.  The goals of palate procedures are to widen the airway in this region as well as to help the tissues resist collapse.  Modern palate procedure techniques focus on tissue conservation and soft tissue rearrangement, rather than tissue removal.  Often the uvula is  preserved in this procedure. Residual sleep apnea is common in patient after pharyngoplasty with an average reduction in sleep apnea events of 33%2.      Tongue Procedures:  ExamWhile patients are awake, the muscles that surround the throat are active and keep this region open for breathing. These muscles relax during sleep, allowing the tongue and other structures to collapse and block breathing.  There are several different tongue procedures available.  Selection of a tongue base procedure depends on characteristics seen on physical exam.  Generally, procedures are aimed at removing bulky tissues in this area or preventing the back of the tongue from falling back during sleep.  Success rates for tongue surgery range from 50-62%3.    Hypoglossal Nerve Stimulation:  Hypoglossal nerve stimulation has recently received approval from the [place] Food and Drug Administration for the treatment of obstructive sleep apnea.  This is based on research showing that the system was safe and effective in treating sleep apnea6.  Results showed that the median AHI score decreased 68%, from 29.3 to 9.0. This therapy uses an implant system that senses breathing patterns and delivers mild stimulation to airway muscles, which keeps the airway open during sleep.  The system consists of three fully implanted components: a small generator (similar in size to a pacemaker), a breathing sensor, and a stimulation lead.  Using a small handheld remote, a patient turns the therapy on before bed and off upon awakening.    Candidates for this device must be greater than 22 years of age, have moderate to severe CAIO (AHI between 20-65), BMI less than 32, have tried CPAP/oral appliance without success, and have appropriate upper airway anatomy (determined by a sleep endoscopy performed by Dr. Nguyen).    Hypoglossal Nerve Stimulation Pathway:    The sleep surgeon s office will work with the patient through the insurance prior-authorization  process (including communications and appeals).    Nasal Procedures:  Nasal obstruction can interfere with nasal breathing during the day and night.  Studies have shown that relief of nasal obstruction can improve the ability of some patients to tolerate positive airway pressure therapy for obstructive sleep apnea1.  Treatment options include medications such as nasal saline, topical corticosteroid and antihistamine sprays, and oral medications such as antihistamines or decongestants. Non-surgical treatments can include external nasal dilators for selected patients. If these are not successful by themselves, surgery can improve the nasal airway either alone or in combination with these other options.      Combination Procedures:  Combination of surgical procedures and other treatments may be recommended, particularly if patients have more than one area of narrowing or persistent positional disease.  The success rate of combination surgery ranges from 66-80%2,3.    References  1. Uriah ZHONG. The Role of the Nose in Snoring and Obstructive Sleep Apnoea: An Update.  Eur Arch Otorhinolaryngol. 2011; 268: 1365-73.  2.  Berenice SM; Joey JA; Chiquita JR; Pallanch JF; Jeremy MB; Brandi SG; Kady FENG. Surgical modifications of the upper airway for obstructive sleep apnea in adults: a systematic review and meta-analysis. SLEEP 2010;33(10):7969-7069. Juancarlos TAI. Hypopharyngeal surgery in obstructive sleep apnea: an evidence-based medicine review.  Arch Otolaryngol Head Neck Surg. 2006 Feb;132(2):206-13.  3. Roddy YH1, Dony Y, Grant JILLIAN. The efficacy of anatomically based multilevel surgery for obstructive sleep apnea. Otolaryngol Head Neck Surg. 2003 Oct;129(4):327-35.  4. Kezirian E, Goldberg A. Hypopharyngeal Surgery in Obstructive Sleep Apnea: An Evidence-Based Medicine Review. Arch Otolaryngol Head Neck Surg. 2006 Feb;132(2):206-13.  5. Sindhu FRASER et al. Upper-Airway Stimulation for Obstructive Sleep Apnea.  N Engl J Med.  "2014 Jan 9;370(2):139-49.  6. Otto Y et al. Increased Incidence of Cardiovascular Disease in Middle-aged Men with Obstructive Sleep Apnea. Am J Respir Crit Care Med; 2002 166: 159-165  7. Dhruv ORTIZ et al. Studying Life Effects and Effectiveness of Palatopharyngoplasty (SLEEP) study: Subjective Outcomes of Isolated Uvulopalatopharyngoplasty. Otolaryngol Head Neck Surg. 2011; 144: 623-631.                      Follow-ups after your visit        Follow-up notes from your care team     Return for psg, urgent \"rumble strips\", follow up with me.      Your next 10 appointments already scheduled     Oct 09, 2018  8:00 PM CDT   PSG Split with SLEEP STUDY RM 1   [facility] ([facility])    [address] [place] [id number] [phone]            Nov 02, 2018  8:00 AM CDT   Return Sleep Patient with JOHNN[facility])    [address] [place] [id number] [phone]              Future tests that were ordered for you today     Open Future Orders        Priority Expected Expires Ordered    Comprehensive Sleep Study Routine  3/31/2019 10/2/2018            Who to contact     If you have questions or need follow up information about today's clinic visit or your schedule please contact [facility] directly at [phone].  Normal or non-critical lab and imaging results will be communicated to you by MyChart, letter or phone within 4 business days after the clinic has received the results. If you do not hear from us within 7 days, please contact the clinic through MyChart or phone. If you have a critical or abnormal lab result, we will notify you by phone as soon as possible.  Submit refill requests through [employer] or call your pharmacy and they will forward the refill request to us. Please allow 3 business days " "for your refill to be completed.          Additional Information About Your Visit        MyChart Information     [employer] gives you secure access to your electronic health record. If you see a primary care provider, you can also send messages to your care team and make appointments. If you have questions, please call your primary care clinic.  If you do not have a primary care provider, please call [phone] and they will assist you.        Care EveryWhere ID     This is your Care EveryWhere ID. This could be used by other organizations to access your [facility] medical records  [id number]        Your Vitals Were     Pulse Respirations Height Pulse Oximetry BMI (Body Mass Index)       69 16 1.829 m (6' 0.01\") 97% 26.17 kg/m2        Blood Pressure from Last 3 Encounters:   10/02/18 153/89   10/01/18 (!) 155/99   09/06/18 (!) 144/96    Weight from Last 3 Encounters:   10/02/18 87.5 kg (193 lb)   10/01/18 88.1 kg (194 lb 3.2 oz)   09/06/18 85.3 kg (188 lb)                 Today's Medication Changes          These changes are accurate as of 10/2/18  3:35 PM.  If you have any questions, ask your nurse or doctor.               Start taking these medicines.        Dose/Directions    zolpidem 10 MG tablet   Commonly known as:  AMBIEN   Used for:  Psychophysiological insomnia, Snoring   Started by:  Niru Montilla APRN CNP        Take tablet by mouth 15 minutes prior to sleep, for Sleep Study   Quantity:  1 tablet   Refills:  0            Where to get your medicines      Some of these will need a paper prescription and others can be bought over the counter.  Ask your nurse if you have questions.     Bring a paper prescription for each of these medications     zolpidem 10 MG tablet                Primary Care Provider Office Phone # Fax #    JOHNNY Kirkland [phone] [address] [place] [address]        Equal Access to Services     AMY ASTUDILLO AH: Celine contreras " bradley Keenan, bailee mckeonadaha, qamichelleta karaheem olmedo, tea rafiqin hayaaranjeet albertashish jazzfrankie lapaxtonranjeet guera. So [facility] [phone].    ATENCIÓN: Si habla español, tiene a hastings disposición servicios gratuitos de asistencia lingüística. Juan R al [phone].    We comply with applicable federal civil rights laws and [place] laws. We do not discriminate on the basis of race, color, national origin, age, disability, sex, sexual orientation, or gender identity.            Thank you!     Thank you for choosing [facility]  for your care. Our goal is always to provide you with excellent care. Hearing back from our patients is one way we can continue to improve our services. Please take a few minutes to complete the written survey that you may receive in the mail after your visit with us. Thank you!             Your Updated Medication List - Protect others around you: Learn how to safely use, store and throw away your medicines at www.disposemymeds.org.          This list is accurate as of 10/2/18  3:35 PM.  Always use your most recent med list.                   Brand Name Dispense Instructions for use Diagnosis    apixaban ANTICOAGULANT 5 MG tablet    ELIQUIS    60 tablet    Take 1 tablet (5 mg) by mouth 2 times daily    New onset a-fib (H)       metoprolol succinate 50 MG 24 hr tablet    TOPROL-XL    30 tablet    Take 1 tablet (50 mg) by mouth daily    HTN (hypertension), Paroxysmal supraventricular tachycardia (H)       metoprolol tartrate 50 MG tablet    LOPRESSOR          ZANTAC PO           zolpidem 10 MG tablet    AMBIEN    1 tablet    Take tablet by mouth 15 minutes prior to sleep, for Sleep Study    Psychophysiological insomnia, Snoring

## 2018-10-02 NOTE — Clinical Note
Please see sleep consult-pt reports excssive anxiety-wishes you to know. JOHNNY Workman, CNP-BC Sleep Medicine

## 2018-10-02 NOTE — NURSING NOTE
"    Chief Complaint   Patient presents with     Consult       Initial /89  Pulse 69  Resp 16  Ht 1.829 m (6' 0.01\")  Wt 87.5 kg (193 lb)  SpO2 97%  BMI 26.17 kg/m2 Estimated body mass index is 26.17 kg/(m^2) as calculated from the following:    Height as of this encounter: 1.829 m (6' 0.01\").    Weight as of this encounter: 87.5 kg (193 lb).    Medication Reconciliation: complete    Neck circumference: 16.25 inches / 41.5 centimeters.  DME: [facility] ~[place]       "

## 2018-10-02 NOTE — PATIENT INSTRUCTIONS
MY TREATMENT INFORMATION FOR SLEEP APNEA-  Manjinder Esparza    NP : Niru Banegas [place]  SLEEP CENTER :  [place]    MY CONTACT NUMBER: [phone]    If you need to cancel your sleep study, please call as soon as possible.  If you were prescribed a sleep aid, bring that to the sleep lab.    Please remember: do not drive if sleepy    Dr. MATA  Avoid alcohol within 3-4 hrs of bedtime  Avoid clock watching  Sleep diaries:    To me                                                                                                                                                                                                                                                                                                                                                                                                                                                                                                                                                                                                                                                                                                                                                                                                                                                                                                                                                                                                                                                                                                                                                                                                                                                                                                                                                                                                                                                                                                                                                                                                                                                                                                                                                                                                                                                                                                                                                                                                                                                                                                                                                                                                                                                                                                                                                                                                                                                                                                                                                                                                                                                                                                                                                                                                                                                                                                                                                                                                                                                                                                                                                                                                                                                                                                                                                                                                                                                          Am I having a sleep study at a sleep center?  Make sure you have an appointment for the study before you leave!    Am I having a home sleep  "study?  Watch this video:  https://www.Appb[employer].com/watch?v=CteI_GhyP9g&[email or web address]  Please verify your insurance coverage with your insurance carrier    Frequently asked questions:  1. What is Obstructive Sleep Apnea (CAIO)? CAIO is the most common type of sleep apnea. Apnea means, \"without breath.\"  Apnea is most often caused by narrowing or collapse of the upper airway as muscles relax during sleep.   Almost everyone has occasional apneas. Most people with sleep apnea have had brief interruptions at night frequently for many years.  The severity of sleep apnea is related to how frequent and severe the events are.   2. What are the consequences of CAIO? Symptoms include: feeling sleepy during the day, snoring loudly, gasping or stopping of breathing, trouble sleeping, and occasionally morning headaches or heartburn at night.  Sleepiness can be serious and even increase the risk of falling asleep while driving. Other health consequences may include development of high blood pressure and other cardiovascular disease in persons who are susceptible. Untreated CAIO  can contribute to heart disease, stroke and diabetes.   3. What are the treatment options? In most situations, sleep apnea is a lifelong disease that must be managed with daily therapy. Medications are not effective for sleep apnea and surgery is generally not considered until other therapies have been tried. Your treatment is your choice . Continuous Positive Airway (CPAP) works right away and is the therapy that is effective in nearly everyone. An oral device to hold your jaw forward is usually the next most reliable option. Other options include postioning devices (to keep you off your back), weight loss, and surgery including a tongue pacing device. There is more detail about some of these options below.    Important tips for using CPAP and similar devices   Know your equipment:  CPAP is continuous positive airway pressure that " prevents obstructive sleep apnea by keeping the throat from collapsing while you are sleeping. In most cases, the device is  smart  and can slowly self-adjusts if your throat collapses and keeps a record every day of how well you are treated-this information is available to you and your care team.  BPAP is bilevel positive airway pressure that keeps your throat open and also assists each breath with a pressure boost to maintain adequate breathing.  Special kinds of BPAP are used in patients who have inadequate breathing from lung or heart disease. In most cases, the device is  smart  and can slowly self-adjusts to assist breathing. Like CPAP, the device keeps a record of how well you are treated.  Your mask is your connection to the device. You get to choose what feels most comfortable and the staff will help to make sure if fits. Here: are some examples of the different masks that are available:       Key points to remember on your journey with sleep apnea:  1. Sleep study.  PAP devices often need to be adjusted during a sleep study to show that they are effective and adjusted right.  2. Good tips to remember: Try wearing just the mask during a quiet time during the day so your body adapts to wearing it. A humidifier is recommended for comfort in most cases to prevent drying of your nose and throat. Allergy medication from your provider may help you if you are having nasal congestion.  3. Getting settled-in. It takes more than one night for most of us to get used to wearing a mask. Try wearing just the mask during a quiet time during the day so your body adapts to wearing it. A humidifier is recommended for comfort in most cases. Our team will work with you carefully on the first day and will be in contact within 4 days and again at 2 and 4 weeks for advice and remote device adjustments. Your therapy is evaluated by the device each day.   4. Use it every night. The more you are able to sleep naturally for 7-8  hours, the more likely you will have good sleep and to prevent health risks or symptoms from sleep apnea. Even if you use it 4 hours it helps. Occasionally all of us are unable to use a medical therapy, in sleep apnea, it is not dangerous to miss one night.   5. Communicate. Call our skilled team on the number provided on the first day if your visit for problems that make it difficult to wear the device. Over 2 out of 3 patients can learn to wear the device long-term with help from our team. Remember to call our team or your sleep providers if you are unable to wear the device as we may have other solutions for those who cannot adapt to mask CPAP therapy. It is recommended that you sleep your sleep provider within the first 3 months and yearly after that if you are not having problems.   Take care of your equipment. Make sure you clean your mask and tubing using directions every day and that your filter and mask are replaced as recommended or if they are not working.     BESIDES CPAP, WHAT OTHER THERAPIES ARE THERE?    Positioning Device  Positioning devices are generally used when sleep apnea is mild and only occurs on your back.This example shows a pillow that straps around the waist. It may be appropriate for those whose sleep study shows milder sleep apnea that occurs primarily when lying flat on one's back. Preliminary studies have shown benefit but effectiveness at home may need to be verified by a home sleep test. These devices are generally not covered by medical insurance.  Examples of devices that maintain sleeping on the back to prevent snoring and mild sleep apnea.    Belt type body positioner  Http://[employer]/    Electronic reminder  Http://nightshifttherapy.com/  Http://www.[employer].com.au/    Oral Appliance  What is oral appliance therapy?  An oral appliance device fits on your teeth at night like a retainer used after having braces. The device is made by a specialized dentist and requires several  visits over 1-2 months before a manufactured device is made to fit your teeth and is adjusted to prevent your sleep apnea. Once an oral device is working properly, snoring should be improved. A home sleep test may be recommended at that time if to determine whether the sleep apnea is adequately treated.       Some things to remember:  -Oral devices are often, but not always, covered by your medical insurance. Be sure to check with your insurance provider.   -If you are referred for oral therapy, you will be given a list of specialized dentists to consider or you may choose to visit the Web site of the American Academy of Dental Sleep Medicine  -Oral devices are less likely to work if you have severe sleep apnea or are extremely overweight.     More detailed information  An oral appliance is a small acrylic device that fits over the upper and lower teeth  (similar to a retainer or a mouth guard). This device slightly moves jaw forward, which moves the base of the tongue forward, opens the airway, improves breathing for effective treat snoring and obstructive sleep apnea in perhaps 7 out of 10 people .  The best working devices are custom-made by a dental device  after a mold is made of the teeth 1, 2, 3.  When is an oral appliance indicated?  Oral appliance therapy is recommended as a first-line treatment for patients with primary snoring, mild sleep apnea, and for patients with moderate sleep apnea who prefer appliance therapy to use of CPAP4, 5. Severity of sleep apnea is determined by sleep testing and is based on the number of respiratory events per hour of sleep.   How successful is oral appliance therapy?  The success rate of oral appliance therapy in patients with mild sleep apnea is 75-80% while in patients with moderate sleep apnea it is 50-70%. The chance of success in patients with severe sleep apnea is 40-50%. The research also shows that oral appliances have a beneficial effect on the  cardiovascular health of CAIO patients at the same magnitude as CPAP therapy7.  Oral appliances should be a second-line treatment in cases of severe sleep apnea, but if not completely successful then a combination therapy utilizing CPAP plus oral appliance therapy may be effective. Oral appliances tend to be effective in a broad range of patients although studies show that the patients who have the highest success are females, younger patients, those with milder disease, and less severe obesity. 3, 6.   Finding a dentist that practices dental sleep medicine  Specific training is available through the American Academy of Dental Sleep Medicine for dentists interested in working in the field of sleep. To find a dentist who is educated in the field of sleep and the use of oral appliances, near you, visit the Web site of the American Academy of Dental Sleep Medicine.    References  1. Carmella et al. Objectively measured vs self-reported compliance during oral appliance therapy for sleep-disordered breathing. Chest 2013; 144(5): 0342-0249.  2. Elissa et al. Objective measurement of compliance during oral appliance therapy for sleep-disordered breathing. Thorax 2013; 68(1): 91-96.  3. Gavin, et al. Mandibular advancement devices in 620 men and women with CAIO and snoring: tolerability and predictors of treatment success. Chest 2004; 125: 3875-1755.  4. Jak, et al. Oral appliances for snoring and CAIO: a review. Sleep 2006; 29: 244-262.  5. Gabriel et al. Oral appliance treatment for CAIO: an update. J Clin Sleep Med 2014; 10(2): 215-227.  6. Federico et al. Predictors of OSAH treatment outcome. J Dent Res 2007; 86: 0995-2680.      Weight Loss:    Weight loss is a long-term strategy that may improve sleep apnea in some patients.    Weight management is a personal decision and the decision should be based on your interest and the potential benefits.  If you are interested in exploring weight loss  strategies, the following discussion covers the impact on weight loss on sleep apnea and the approaches that may be successful.    Being overweight does not necessarily mean you will have health consequences.  Those who have BMI over 35 or over 27 with existing medical conditions carries greater risk.   Weight loss decreases severity of sleep apnea in most people with obesity. For those with mild obesity who have developed snoring with weight gain, even 15-30 pound weight loss can improve and occasionally eliminate sleep apnea.  Structured and life-long dietary and health habits are necessary to lose weight and keep healthier weight levels.     Though there may be significant health benefits from weight loss, long-term weight loss is very difficult to achieve- studies show success with dietary management in less than 10% of people. In addition, substantial weight loss may require years of dietary control and may be difficult if patients have severe obesity. In these cases, surgical management may be considered.  Finally, older individuals who have tolerated obesity without health complications may be less likely to benefit from weight loss strategies.      [unfilled]    Surgery:    Surgery for obstructive sleep apnea is considered generally only when other therapies fail to work. Surgery may be discussed with you if you are having a difficult time tolerating CPAP and or when there is an abnormal structure that requires surgical correction.  Nose and throat surgeries often enlarge the airway to prevent collapse.  Most of these surgeries create pain for 1-2 weeks and up to half of the most common surgeries are not effective throughout life.  You should carefully discuss the benefits and drawbacks to surgery with your sleep provider and surgeon to determine if it is the best solution for you.   More information  Surgery for CAIO is directed at areas that are responsible for narrowing or complete obstruction of the  airway during sleep.  There are a wide range of procedures available to enlarge and/or stabilize the airway to prevent blockage of breathing in the three major areas where it can occur: the palate, tongue, and nasal regions.  Successful surgical treatment depends on the accurate identification of the factors responsible for obstructive sleep apnea in each person.  A personalized approach is required because there is no single treatment that works well for everyone.  Because of anatomic variation, consultation with an examination by a sleep surgeon is a critical first step in determining what surgical options are best for each patient.  In some cases, examination during sedation may be recommended in order to guide the selection of procedures.  Patients will be counseled about risks and benefits as well as the typical recovery course after surgery. Surgery is typically not a cure for a person s CAIO.  However, surgery will often significantly improve one s CAIO severity (termed  success rate ).  Even in the absence of a cure, surgery will decrease the cardiovascular risk associated with OSA7; improve overall quality of life8 (sleepiness, functionality, sleep quality, etc).      Palate Procedures:  Patients with CAIO often have narrowing of their airway in the region of their tonsils and uvula.  The goals of palate procedures are to widen the airway in this region as well as to help the tissues resist collapse.  Modern palate procedure techniques focus on tissue conservation and soft tissue rearrangement, rather than tissue removal.  Often the uvula is preserved in this procedure. Residual sleep apnea is common in patient after pharyngoplasty with an average reduction in sleep apnea events of 33%2.      Tongue Procedures:  ExamWhile patients are awake, the muscles that surround the throat are active and keep this region open for breathing. These muscles relax during sleep, allowing the tongue and other structures to  collapse and block breathing.  There are several different tongue procedures available.  Selection of a tongue base procedure depends on characteristics seen on physical exam.  Generally, procedures are aimed at removing bulky tissues in this area or preventing the back of the tongue from falling back during sleep.  Success rates for tongue surgery range from 50-62%3.    Hypoglossal Nerve Stimulation:  Hypoglossal nerve stimulation has recently received approval from the [place] Food and Drug Administration for the treatment of obstructive sleep apnea.  This is based on research showing that the system was safe and effective in treating sleep apnea6.  Results showed that the median AHI score decreased 68%, from 29.3 to 9.0. This therapy uses an implant system that senses breathing patterns and delivers mild stimulation to airway muscles, which keeps the airway open during sleep.  The system consists of three fully implanted components: a small generator (similar in size to a pacemaker), a breathing sensor, and a stimulation lead.  Using a small handheld remote, a patient turns the therapy on before bed and off upon awakening.    Candidates for this device must be greater than 22 years of age, have moderate to severe CAIO (AHI between 20-65), BMI less than 32, have tried CPAP/oral appliance without success, and have appropriate upper airway anatomy (determined by a sleep endoscopy performed by Dr. Nguyen).    Hypoglossal Nerve Stimulation Pathway:    The sleep surgeon s office will work with the patient through the insurance prior-authorization process (including communications and appeals).    Nasal Procedures:  Nasal obstruction can interfere with nasal breathing during the day and night.  Studies have shown that relief of nasal obstruction can improve the ability of some patients to tolerate positive airway pressure therapy for obstructive sleep apnea1.  Treatment options include medications such as nasal  saline, topical corticosteroid and antihistamine sprays, and oral medications such as antihistamines or decongestants. Non-surgical treatments can include external nasal dilators for selected patients. If these are not successful by themselves, surgery can improve the nasal airway either alone or in combination with these other options.      Combination Procedures:  Combination of surgical procedures and other treatments may be recommended, particularly if patients have more than one area of narrowing or persistent positional disease.  The success rate of combination surgery ranges from 66-80%2,3.    References  1. Uriah ZHONG. The Role of the Nose in Snoring and Obstructive Sleep Apnoea: An Update.  Eur Arch Otorhinolaryngol. 2011; 268: 1365-73.  2.  Berenice SM; Joey JA; Chiquita JR; Pallanch JF; Jeremy MB; Brandi SG; Kady FENG. Surgical modifications of the upper airway for obstructive sleep apnea in adults: a systematic review and meta-analysis. SLEEP 2010;33(10):0151-2803. Juancarlos TAI. Hypopharyngeal surgery in obstructive sleep apnea: an evidence-based medicine review.  Arch Otolaryngol Head Neck Surg. 2006 Feb;132(2):206-13.  3. Roddy YH1, Dony Y, Grant JILLIAN. The efficacy of anatomically based multilevel surgery for obstructive sleep apnea. Otolaryngol Head Neck Surg. 2003 Oct;129(4):327-35.  4. Juancarlos TAI, Goldberg A. Hypopharyngeal Surgery in Obstructive Sleep Apnea: An Evidence-Based Medicine Review. Arch Otolaryngol Head Neck Surg. 2006 Feb;132(2):206-13.  5. Sindhu FRASER et al. Upper-Airway Stimulation for Obstructive Sleep Apnea.  N Engl J Med. 2014 Jan 9;370(2):139-49.  6. Otto Y et al. Increased Incidence of Cardiovascular Disease in Middle-aged Men with Obstructive Sleep Apnea. Am J Respir Crit Care Med; 2002 166: 159-165  7. Dhruv ORTIZ et al. Studying Life Effects and Effectiveness of Palatopharyngoplasty (SLEEP) study: Subjective Outcomes of Isolated Uvulopalatopharyngoplasty. Otolaryngol Head Neck Surg.  2011; 144: 623-631.

## 2018-10-03 NOTE — PROGRESS NOTES
Visit Date:   10/02/2018      CHIEF COMPLAINT:  Mr. Esparza has self-referred for problems with obstructive sleep apnea and difficulty readapting to an old CPAP.      HISTORY OF PRESENT ILLNESS:  Mr. Esparza is a 53-year-old male who was previously diagnosed with upper airway resistance syndrome on 10/23/2010.  At that time, he weighed 210 pounds.  He had a total of 2 obstructive, 5 centrals, 1 mixed, 7 hypopneas, and 53 RERAs noted, for an RDI of 11.5 and AHI of 2.5.  It appears also that there was excessive transient muscle activity in 2 epochs during REM sleep.  This would be suspicious for REM without atonia. He did have PLM with a PLM index of 6.3.  His PLM arousal index was 3.2.  He was provided with a CPAP which from getting information off the settings does look to be an APAP 5-15 cm of water.  Average pressure being used is 8.  Average leak 0.44.  Residual AHI 3.8.  Apnea index 0.8, hypopnea index 3.0.  It has been used 607 hours and the number of days used 109/3121.  His primary usage has been since July when he was diagnosed with atrial fib.  Average usage on his machine is 7 hours and 4 minutes.  I am unable to get any further data at this time.      It is apparent that he is using nasal pillows for an interface and he has no idea how old his mask is.  He reports that without CPAP there is snoring 7 nights a week, it can be heard outside the bedroom, he may awaken with a snort or gasping arousal 2-3/7 nights and he does have witnessed apneas.  He does awaken in the morning with a dry throat, and 1-2 times a week may have a headache, possibly related to grinding his teeth.  I am uncertain of the response of the machine on his apnea as he did not complete this questionnaire at home.  No signs of orexin deficiency with the exception of a rare sleep paralysis.  Does report a nightmare once a week. Sleep schedule is as follows:  Entering bed between 9:00 and 10:00 p.m. on workdays, staying up until around 12 on  weekends.  Exits bed at 5:00 a.m. on workdays, 6:00 a.m. on weekends.  He does report that he often can get maybe an hour and a half, maybe 2-3 hours of sleep and then wake up somewhere between 1:30-2:00 a.m.  He will try to fall back asleep.  He will think. He will  his phone.  Sometimes he might watch TV and on occasion he just gets up to work.  This gives him an exceptionally short night on some nights. On weekends, 1-2 nights he will wake up and not be able to return to sleep.  He will use the TV.  He may go into work or start working on projects around his home.  He thinks that the machine is waking him up noise and being restless.  He tries to fall back asleep and sometimes goes elsewhere.  On sleep nights, he gets 5-6 hours, weekends 6-7.  Feels his best if he gets 7.  He does take a nap but it is very rare and he feels better after that.  In general, no naps throughout the week.  Activities in bed do include using a phone or computer.      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  Bed partner and noise and stress and worry may disturb his sleep.  Family has been diagnosed with sleep apnea, uncertain of other diagnoses.  Alcohol: On Friday night he may start drinking at around 6:00 p.m. to 10-11, on Saturdays around 3:00 p.m. On Sunday, he does not drink.  He denies significant use of nicotine or caffeine.  His [place] Sleepiness score today is 10-13.  No problems with falling asleep stopped in traffic, however, within the last 6 months after having not slept well the night before, did hear the rumble strips on the way home going home at 5:00 p.m.  He also reports that concentration and memory have been worse over the last 4-5 years.  Fifteen out of 30 days he is tired and fatigued, 5/30 days anxiety more than depression.      REVIEW OF SYSTEMS:  A 14-point comprehensive review of systems completed and negative with the exception of  the following:   EYES:  Some change in vision.   EARS, NOSE AND THROAT:  Some sore throat.    CARDIOVASCULAR: Was evaluated for what is assumed to be an intermittent atrial fibrillation.  He has been exceptionally anxious about this diagnosis and has been having some anxiety attacks sometimes just sitting at work or thinking about his heart.  He can have chest pain, shortness of breath with these episodes. He has shortness of breath when lying down with regard to his apnea.  Has hypertension.   NERVOUS SYSTEM:  Headaches and numbness in arms or legs.  He recently had some numbness in his left arm.  He did a CT angio and from what he knows it has been negative.   PULMONARY:  Shortness of breath with activity but can do 2 flights of stairs and at rest with anxiety.   DIGESTIVE:  Diarrhea.   MUSCLES AND BONES:  Bilateral knee pain.   MENTAL HEALTH:  Anxiety.      MEDICAL CONDITIONS: AFib.      MEDICATIONS:  Eliquis and metoprolol.   Allergies:    Allergies   Allergen Reactions     Lisinopril      cough       Medications:    Current Outpatient Prescriptions   Medication Sig Dispense Refill     apixaban ANTICOAGULANT (ELIQUIS) 5 MG tablet Take 1 tablet (5 mg) by mouth 2 times daily 60 tablet 2     metoprolol succinate (TOPROL-XL) 50 MG 24 hr tablet Take 1 tablet (50 mg) by mouth daily 30 tablet 11     metoprolol tartrate (LOPRESSOR) 50 MG tablet        RaNITidine HCl (ZANTAC PO)        zolpidem (AMBIEN) 10 MG tablet Take tablet by mouth 15 minutes prior to sleep, for Sleep Study 1 tablet 0       Problem List:  Patient Active Problem List    Diagnosis Date Noted     Pulmonary nodule, 2mm, RML 07/19/2018     Priority: Medium     2mm, RML       Benign lipomatous neoplasm of skin and subcutaneous tissue of trunk 07/19/2018     Priority: Medium     New onset a-fib (H) 06/28/2018     Priority: Medium     Benign essential hypertension 11/07/2016     Priority: Medium     CARDIOVASCULAR SCREENING; LDL GOAL LESS THAN 160  "05/09/2010     Priority: Medium     Per provider          Past Medical/Surgical History:  Past Medical History:   Diagnosis Date     Atrial fibrillation (H)      Gastroesophageal reflux disease      Hypertension      Past Surgical History:   Procedure Laterality Date     ANESTHESIA CARDIOVERSION N/A 6/29/2018    Procedure: ANESTHESIA CARDIOVERSION;  Cardioversion;  Surgeon: GENERIC ANESTHESIA PROVIDER;  Location: [facility] OR     SURGICAL HISTORY OF -       animal bone graft in the left lower jaw           Physical Examination:  Vitals: /89  Pulse 69  Resp 16  Ht 1.829 m (6' 0.01\")  Wt 87.5 kg (193 lb)  SpO2 97%  BMI 26.17 kg/m2  BMI= Body mass index is 26.17 kg/(m^2).         No flowsheet data found.    GENERAL APPEARANCE: healthy, alert and no distress  EYES: Eyes grossly normal to inspection  HENT: oropharynx crowded, uvula elongated, soft palate dependent, tongue base enlarged and nares patent  NECK: thyroid normal to palpation  RESP: lungs clear to auscultation - no rales, rhonchi or wheezes  CV: regular rates and rhythm, normal S1 S2, no S3 or S4 and no murmur, click or rub  Extremities are without clubbing, edema  Musculoskeletal:Moves without difficulty  Mallampati Class: IV.  Tonsillar Stage: 1  hidden by pillars.  Dental: Dentition in good condition.  Good advancement of lower jaw without tmd  Skin: without facial rash     ASSESSMENT AND PLAN:  It is my impression that Mr. Esparza has several concerns with regard to his sleep.  He has a sleep study originally done in 2010, more than 8 years ago, which really did not indicate obstructive sleep apnea.  He has not felt very comfortable with CPAP as an option; however, he has not had the time to fully evaluate a different type of mask or a different device that could be utilized and the following plan of care has been developed:   1.  Upper airway resistance syndrome with reports of increase in apneic events, loud snoring, particularly worse if he is " supine, and a STOP-Bang score of 7/8.  That gives him a high pretest probability for obstructive sleep apnea.  He has been having problems with driving, memory and concentration.  I have ordered an in-lab polysomnogram to occur with his pattern of exceptionally fragmented sleep along with the use of Ambien to improve diagnostic opportunity.  We have discussed all modalities of treatment.  He is heartened to knows that perhaps some mandibular advancement device might be something that he could qualify for.  He is open to most interventions and also knows that there have been significant changes made in CPAP masking and therapy.   2.  Sleep habits:  He is utilizing a significant amount of alcohol on weekends, which is further limiting his total sleep time, admitting to exceptionally poor sleep continuity in the later part of the night when he does awaken secondary to alcohol.  He has been encouraged to cut back so that he is not having any alcohol within 3-4 hours of bedtime. We will support him as he does make this process change.   3.  Likely psychophysiologic insomnia.  He has exceptional anxiety and stress at this point, certainly undergoing both changes in his health with his atrial fib and personal stresses.  He has not spoken with his primary care and is open for me to provide a copy of his sleep consult to NP Liat MOSS for her evaluation and consideration for possible treatment.  I did tell him as well that cognitive behavioral therapy for insomnia works quite well.  In fact, better than meds; however, the level of stress has to be relatively well managed so that he is able to comply with some of the measures of stimulus control and sleep restriction which is utilized to improve sleep drive and relearn how to fall asleep and stay asleep in a reasonable manner.  He will keep sleep diaries or drop them off as I have asked for his sleep study to be done on a more urgent basis because of his driving problems.       Thank you for allowing me to participate in this kind man's care.      Time spent with patient 60 minutes, of which greater than 50% was spent in counseling, education and coordination of care.         JOHNNY MARINO, CNP             D: 10/02/2018   T: 10/03/2018   MT: CHIKIS      Name:     ROCCO CASSIDY   MRN:      [id number]        Account:      [id number]   :      1965           Visit Date:   10/02/2018      Document: [id number]

## 2018-10-08 ENCOUNTER — TELEPHONE (OUTPATIENT)
Dept: SLEEP MEDICINE | Facility: CLINIC | Age: 53
End: 2018-10-08

## 2018-10-08 ENCOUNTER — OFFICE VISIT (OUTPATIENT)
Dept: URGENT CARE | Facility: URGENT CARE | Age: 53
End: 2018-10-08
Payer: COMMERCIAL

## 2018-10-08 ENCOUNTER — MYC MEDICAL ADVICE (OUTPATIENT)
Dept: FAMILY MEDICINE | Facility: CLINIC | Age: 53
End: 2018-10-08

## 2018-10-08 VITALS
HEART RATE: 52 BPM | HEIGHT: 72 IN | OXYGEN SATURATION: 99 % | DIASTOLIC BLOOD PRESSURE: 93 MMHG | WEIGHT: 192 LBS | SYSTOLIC BLOOD PRESSURE: 170 MMHG | BODY MASS INDEX: 26.01 KG/M2 | TEMPERATURE: 96.6 F

## 2018-10-08 DIAGNOSIS — R00.2 PALPITATIONS: Primary | ICD-10-CM

## 2018-10-08 PROCEDURE — 99214 OFFICE O/P EST MOD 30 MIN: CPT | Performed by: PEDIATRICS

## 2018-10-08 PROCEDURE — 93000 ELECTROCARDIOGRAM COMPLETE: CPT | Performed by: PEDIATRICS

## 2018-10-08 NOTE — TELEPHONE ENCOUNTER
Routing to provider - hJoan - please review and advise as appropriate    Writer notes 10/1 - seen in ED for chest pain - no acute occurrence    Thank you,  Esteban Miramontes RN

## 2018-10-08 NOTE — PROGRESS NOTES
SUBJECTIVE:  Manjinder Esparza is a 53 year old male who presents to the office with the CC of chest pain.  Patient complains of chest pressure/discomfort and fatigue.  The pain is characterized as moderate and transient dull located epigastric with radiation to none. Symptoms began 1 day(s) ago, gradual onset  Pain is associated with stress/anxiety.  Pain is exacerbated by no known provoking events.  Pain is relieved by none.  Cardiac risk factors: positive for afib    Past Medical History:   Diagnosis Date     Atrial fibrillation (H)      Gastroesophageal reflux disease      Hypertension          Current Outpatient Prescriptions:      apixaban ANTICOAGULANT (ELIQUIS) 5 MG tablet, Take 1 tablet (5 mg) by mouth 2 times daily, Disp: 60 tablet, Rfl: 2     metoprolol succinate (TOPROL-XL) 50 MG 24 hr tablet, Take 1 tablet (50 mg) by mouth daily, Disp: 30 tablet, Rfl: 11     metoprolol tartrate (LOPRESSOR) 50 MG tablet, , Disp: , Rfl:      RaNITidine HCl (ZANTAC PO), , Disp: , Rfl:      zolpidem (AMBIEN) 10 MG tablet, Take tablet by mouth 15 minutes prior to sleep, for Sleep Study (Patient not taking: Reported on 10/8/2018), Disp: 1 tablet, Rfl: 0    Social History   Substance Use Topics     Smoking status: Never Smoker     Smokeless tobacco: Never Used     Alcohol use 3.6 - 5.4 oz/week     6 - 9 Cans of beer per week      Comment: 12 drinks a week        ROS:INTEGUMENTARY/SKIN: NEGATIVE for worrisome rashes, moles or lesions  EYES: NEGATIVE for vision changes or irritation  GI: NEGATIVE for nausea, abdominal pain, heartburn, or change in bowel habits  MUSCULOSKELETAL: NEGATIVE for significant arthralgias or myalgia  NEURO: NEGATIVE for weakness, dizziness or paresthesias    EXAM:  BP (!) 170/93  Pulse 52  Temp 96.6  F (35.9  C) (Tympanic)  Ht 6' (1.829 m)  Wt 192 lb (87.1 kg)  SpO2 99%  BMI 26.04 kg/m2  GENERAL APPEARANCE: healthy, alert and no distress  EYES: EOMI,  PERRL, conjunctiva clear  HENT: ear canals and  TM's normal.  Nose and mouth without ulcers, erythema or lesions  NECK: supple, nontender, no lymphadenopathy  RESP: lungs clear to auscultation - no rales, rhonchi or wheezes  CV: regular rates and rhythm, normal S1 S2, no murmur noted  ABDOMEN:  soft, nontender, no HSM or masses and bowel sounds normal  NEURO: Normal strength and tone, sensory exam grossly normal,  normal speech and mentation  SKIN: no suspicious lesions or rashes     Office EKG demonstrates:  appears normal, NSR,normal axis, normal intervals, no acute ST/T changes c/w ischemia,no LVH by voltage criteria,unchanged from previous tracings    Assessment  / IMPRESSION  Bradycardia, possibly 2/2 metoprolol dose.    PLAN:See orders in epic  Recommended to cut metoprolol in half and see cardiology as soon as possible. However, also discussed risk of his afib returning more frequently if he lowers the metoprolol dose. Chest pain is atypical, reassured. Follow-up with cardiology as soon as possible.

## 2018-10-08 NOTE — TELEPHONE ENCOUNTER
----- Message from JOHNNY Cuenca CNP sent at 10/5/2018  7:15 AM CDT -----  Please call and leave message for pt to be off of cpap for 4 days prior to study.  carlos helms

## 2018-10-08 NOTE — TELEPHONE ENCOUNTER
Spoke to the patient on 10/8/2018 at 1:17 PM and was told that he received a call on Friday and was told to stop using his cpap and has not been on it since last week.    Emily Coreas [facility] ~[place]

## 2018-10-08 NOTE — MR AVS SNAPSHOT
After Visit Summary   10/8/2018    Manjinder Esparza    MRN: [id number]           Patient Information     Date Of Birth          1965        Visit Information        Provider Department      10/8/2018 5:45 PM Alvaro Rosales MD [facility]        Today's Diagnoses     Palpitations    -  1       Follow-ups after your visit        Your next 10 appointments already scheduled     Oct 09, 2018  7:20 AM CDT   SHORT with Morgan Farias MD   [facility]    [address] [id number] [phone]            Oct 09, 2018  8:00 PM CDT   PSG Split with SLEEP STUDY RM 1   [facility] ([facility])    [address] [place] [id number] [phone]            Nov 02, 2018  8:00 AM CDT   Return Sleep Patient with JOHNNY Cuenca [facility] ([facility])    [address] [place] [id number] [phone]              Who to contact     If you have questions or need follow up information about today's clinic visit or your schedule please contact [facility] directly at [phone].  Normal or non-critical lab and imaging results will be communicated to you by MyChart, letter or phone within 4 business days after the clinic has received the results. If you do not hear from us within 7 days, please contact the clinic through MyChart or phone. If you have a critical or abnormal lab result, we will notify you by phone as soon as possible.  Submit refill requests through [facility] or call your pharmacy and they will forward the refill request to us. Please allow 3 business days for your refill to be completed.          Additional Information About Your Visit        [employer] Information     [facility] gives you secure access to your electronic  health record. If you see a primary care provider, you can also send messages to your care team and make appointments. If you have questions, please call your primary care clinic.  If you do not have a primary care provider, please call [phone] and they will assist you.        Care EveryWhere ID     This is your Care EveryWhere ID. This could be used by other organizations to access your [facility] medical records  [id number]        Your Vitals Were     Pulse Temperature Height Pulse Oximetry BMI (Body Mass Index)       52 96.6  F (35.9  C) (Tympanic) 6' (1.829 m) 99% 26.04 kg/m2        Blood Pressure from Last 3 Encounters:   10/08/18 (!) 170/93   10/02/18 153/89   10/01/18 (!) 155/99    Weight from Last 3 Encounters:   10/08/18 192 lb (87.1 kg)   10/02/18 193 lb (87.5 kg)   10/01/18 194 lb 3.2 oz (88.1 kg)              We Performed the Following     EKG 12-lead complete w/read - Clinics        Primary Care Provider Office Phone # Fax #    JOHNNY [facility] [phone] [address] [place] [address]        Equal Access to Services     AMY ASTUDILLO : Hadii aad ku hadasho Soomaali, waaxda luqadaha, qaybta kaalmada adeegyada, waxay idiin haybeverleyn cinthia beckford . So [facility] [phone].    ATENCIÓN: Si habla español, tiene a hastings disposición servicios gratuitos de asistencia lingüística. Llame al [phone].    We comply with applicable federal civil rights laws and [place] laws. We do not discriminate on the basis of race, color, national origin, age, disability, sex, sexual orientation, or gender identity.            Thank you!     Thank you for choosing [facility]  for your care. Our goal is always to provide you with excellent care. Hearing back from our patients is one way we can continue to improve our services. Please take a few minutes to complete the written survey that you may receive in the mail after your visit with us.  Thank you!             Your Updated Medication List - Protect others around you: Learn how to safely use, store and throw away your medicines at www.disposemymeds.org.          This list is accurate as of 10/8/18  6:20 PM.  Always use your most recent med list.                   Brand Name Dispense Instructions for use Diagnosis    apixaban ANTICOAGULANT 5 MG tablet    ELIQUIS    60 tablet    Take 1 tablet (5 mg) by mouth 2 times daily    New onset a-fib (H)       metoprolol succinate 50 MG 24 hr tablet    TOPROL-XL    30 tablet    Take 1 tablet (50 mg) by mouth daily    HTN (hypertension), Paroxysmal supraventricular tachycardia (H)       metoprolol tartrate 50 MG tablet    LOPRESSOR          ZANTAC PO           zolpidem 10 MG tablet    AMBIEN    1 tablet    Take tablet by mouth 15 minutes prior to sleep, for Sleep Study    Psychophysiological insomnia

## 2018-10-09 ENCOUNTER — THERAPY VISIT (OUTPATIENT)
Dept: SLEEP MEDICINE | Facility: CLINIC | Age: 53
End: 2018-10-09
Payer: COMMERCIAL

## 2018-10-09 ENCOUNTER — CARE COORDINATION (OUTPATIENT)
Dept: CARDIOLOGY | Facility: CLINIC | Age: 53
End: 2018-10-09

## 2018-10-09 DIAGNOSIS — F51.04 PSYCHOPHYSIOLOGICAL INSOMNIA: ICD-10-CM

## 2018-10-09 DIAGNOSIS — I48.0 PAROXYSMAL ATRIAL FIBRILLATION (H): Primary | ICD-10-CM

## 2018-10-09 DIAGNOSIS — R00.2 PALPITATIONS: ICD-10-CM

## 2018-10-09 PROCEDURE — 95810 POLYSOM 6/> YRS 4/> PARAM: CPT | Performed by: INTERNAL MEDICINE

## 2018-10-09 NOTE — MR AVS SNAPSHOT
After Visit Summary   10/9/2018    Manjinder Esparza    MRN: [id number]           Patient Information     Date Of Birth          1965        Visit Information        Provider Department      10/9/2018 8:00 PM SLEEP STUDY RM 1 [facility]        Today's Diagnoses     Psychophysiological insomnia          Care Instructions    [facility]    1. Your sleep study will be reviewed by a sleep physician within the next few days.     2. Please follow up in the sleep clinic as scheduled, or, make an appointment with your sleep provider to be seen within two weeks to discuss the results of the sleep study.    3. If you have any questions or problems with your treatment plan, please contact your sleep clinic provider at [phone] to further manage your condition.    4. Please review your attached medication list, and, at your follow-up appointment advise your sleep clinic provider about any changes.    5. Go to [email or web address] for more information about your sleep problems.    Zan Salamanca, SHARON  October 10, 2018                Follow-ups after your visit        Your next 10 appointments already scheduled     Oct 12, 2018  2:00 PM CDT   (Arrive by 1:45 PM)   Event Monitor Visit with  Cvc Monitor [facility] ([facility])    [address] [id number] [phone]            Nov 02, 2018  8:00 AM CDT   Return Sleep Patient with JOHNNY Cuenca CNP   [facility] ([facility])    [address] [place] [id number] [phone]              Future tests that were ordered for you today     Open Future Orders        Priority Expected Expires Ordered    Cardiac Event Monitor Routine 10/10/2018 10/9/2019 10/9/2018            Who to contact     If you have questions or need follow up  information about today's clinic visit or your schedule please contact [place] [facility] directly at [phone].  Normal or non-critical lab and imaging results will be communicated to you by MyChart, letter or phone within 4 business days after the clinic has received the results. If you do not hear from us within 7 days, please contact the clinic through MyChart or phone. If you have a critical or abnormal lab result, we will notify you by phone as soon as possible.  Submit refill requests through [facility] or call your pharmacy and they will forward the refill request to us. Please allow 3 business days for your refill to be completed.          Additional Information About Your Visit        MyChart Information     [facility] gives you secure access to your electronic health record. If you see a primary care provider, you can also send messages to your care team and make appointments. If you have questions, please call your primary care clinic.  If you do not have a primary care provider, please call [phone] and they will assist you.        Care EveryWhere ID     This is your Care EveryWhere ID. This could be used by other organizations to access your [facility] medical records  [id number]         Blood Pressure from Last 3 Encounters:   10/08/18 (!) 170/93   10/02/18 153/89   10/01/18 (!) 155/99    Weight from Last 3 Encounters:   10/08/18 87.1 kg (192 lb)   10/02/18 87.5 kg (193 lb)   10/01/18 88.1 kg (194 lb 3.2 oz)              We Performed the Following     Comprehensive Sleep Study        Primary Care Provider Office Phone # Fax #    JOHNNY Kirkland [phone] [address] [place] [address]        Equal Access to Services     AMY ASTUDILLO : Hadii irasema Keenan, wamarquise baitsta, qaybta emyaltea gale. So [facility] [phone].    ATENCIÓN: Si habla español, tiene a hastings disposición  servicios gratuitos de asistencia lingüística. Juan R cole [phone].    We comply with applicable federal civil rights laws and [place] laws. We do not discriminate on the basis of race, color, national origin, age, disability, sex, sexual orientation, or gender identity.            Thank you!     Thank you for choosing [facility]  for your care. Our goal is always to provide you with excellent care. Hearing back from our patients is one way we can continue to improve our services. Please take a few minutes to complete the written survey that you may receive in the mail after your visit with us. Thank you!             Your Updated Medication List - Protect others around you: Learn how to safely use, store and throw away your medicines at www.disposemymeds.org.          This list is accurate as of 10/9/18 11:59 PM.  Always use your most recent med list.                   Brand Name Dispense Instructions for use Diagnosis    apixaban ANTICOAGULANT 5 MG tablet    ELIQUIS    60 tablet    Take 1 tablet (5 mg) by mouth 2 times daily    New onset a-fib (H)       metoprolol succinate 50 MG 24 hr tablet    TOPROL-XL    30 tablet    Take 1 tablet (50 mg) by mouth daily    HTN (hypertension), Paroxysmal supraventricular tachycardia (H)       metoprolol tartrate 50 MG tablet    LOPRESSOR          ZANTAC PO           zolpidem 10 MG tablet    AMBIEN    1 tablet    Take tablet by mouth 15 minutes prior to sleep, for Sleep Study    Psychophysiological insomnia

## 2018-10-09 NOTE — PROGRESS NOTES
Date: 10/9/2018    Time of Call: 8:58 AM     Diagnosis:  Pt c/o fluttering, hx PAF     [ TORB ] Ordering provider: Dr Hendrickson   Order:   Heart monitor for 5 days     Order received by: cecily llanos rn      Follow-up/additional notes: msg sent to pt to see if he can come into clinic or if monitor should be mailed out

## 2018-10-10 NOTE — PATIENT INSTRUCTIONS
[place] [facility]    1. Your sleep study will be reviewed by a sleep physician within the next few days.     2. Please follow up in the sleep clinic as scheduled, or, make an appointment with your sleep provider to be seen within two weeks to discuss the results of the sleep study.    3. If you have any questions or problems with your treatment plan, please contact your sleep clinic provider at [phone] to further manage your condition.    4. Please review your attached medication list, and, at your follow-up appointment advise your sleep clinic provider about any changes.    5. Go to [email or web address] for more information about your sleep problems.    Zan Salamanca, RPSGT  October 10, 2018

## 2018-10-11 NOTE — PROCEDURES
SLEEP STUDY INTERPRETATION  DIAGNOSTIC POLYSOMNOGRAPHY REPORT      Patient: ROCCO CASSIDY  YOB: 1965  Study Date: 09/10/2018  MRN: [id number]  Referring Provider: Liat Stockton MD  Ordering Provider: BETTY Funes CNP    Indications for Polysomnography: The patient is a 53 y old Male who is 6' and weighs 192.0 lbs. His BMI is 26.3, Oxford sleepiness scale 10.0 and neck circumference is 36.0 cm. Relevant medical history includes new onset atrial fibrillation 6/2018, upper airways resistance syndrome 2010, hypertension. A diagnostic polysomnogram was performed to evaluate for sleep apnea.    Polysomnogram Data: A full night polysomnogram recorded the standard physiologic parameters including EEG, EOG, EMG, ECG, nasal and oral airflow. Respiratory parameters of chest and abdominal movements were recorded with respiratory inductance plethysmography. Oxygen saturation was recorded by pulse oximetry. Hypopnea scoring rule used: 1B 4%.    Sleep Architecture: Normal sleep efficiency with mildly increased arousal index.  The total recording time of the polysomnogram was 397.5 minutes. The total sleep time was 372.5 minutes. Sleep latency was decreased at 3.0 minutes with the use of a sleep aid (zolpidem 10 mg). REM latency was 46.5 minutes. Arousal index was increased at 24.8 arousals per hour. Sleep efficiency was normal at 93.7%. Wake after sleep onset was 21.5 minutes. The patient spent 6.2% of total sleep time in Stage N1, 58.0% in Stage N2, 13.0% in Stage N3, and 22.8% in REM. Time in REM supine was 43.0 minutes.      Respiration: Mild supine predominant obstructive sleep apnea.    Events ? The polysomnogram revealed a presence of 5 obstructive, 9 central, and 0 mixed apneas resulting in an apnea index of 2.3 events per hour. There were 68 obstructive hypopneas and 0 central hypopneas resulting in an obstructive hypopnea index of 11.0 and central hypopnea index of 0 events per hour. The  combined apnea/hypopnea index was 13.2 events per hour (central apnea/hypopnea index was 1.4 events per hour). The REM AHI was 16.2 events per hour. The supine AHI was 20.0 events per hour. The RERA index was 11.9 events per hour.  The RDI was 25.1 events per hour.    Snoring - was reported as moderately loud and frequent.    Respiratory rate and pattern - was notable for normal respiratory rate and pattern.    Sustained Sleep Associated Hypoventilation - Transcutaneous carbon dioxide monitoring was not used, however significant hypoventilation was not suggested by oximetry.    Sleep Associated Hypoxemia - (Greater than 5 minutes O2 sat at or below 88%) was not present. Baseline oxygen saturation was 95.2%. Lowest oxygen saturation was 83.3%. Time spent less than or equal to 88% was 1.4 minutes. Time spent less than or equal to 89% was 2.6 minutes.    Movement Activity: No abnormal movement activity.    Periodic Limb Activity - There were 0 PLMs during the entire study. The PLM index was 0 movements per hour. The PLM Arousal Index was 0 per hour.    REM EMG Activity - Excessive transient/sustained muscle activity was not present.    Nocturnal Behavior - Abnormal sleep related behaviors were not noted.    Bruxism - None apparent.    Cardiac Summary: Normal sinus rhythm and rates.  The average pulse rate was 57.8 bpm. The minimum pulse rate was 45.2 bpm while the maximum pulse rate was 99.2 bpm.  Arrhythmias were not noted.      Assessment:     Mild supine predominant obstructive sleep apnea with AHI 13.2 events per hour.    Supine AHI 20    Normal sleep efficiency with mildly increased arousal index.    No abnormal movement activity. REM atonia preserved.    Normal sinus rhythm and rates.     Recommendations:    Consider forma position restriction to lateral sleep positions.     Patient may be a candidate for dental appliance through referral to Sleep Dentistry for the treatment of obstructive sleep apnea .    Based  on the presence of mild obstructive sleep apnea and if clinically indicated, treatment could be empirically initiated with Auto?titrating PAP therapy with a range of 5 to 15 cmH2O. Recommend clinical follow up with sleep management team.    Advice regarding the risks of drowsy driving.    Suggest optimizing sleep schedule and avoiding sleep deprivation.    Weight management     Pharmacologic therapy should be used for management of restless legs syndrome only if present and clinically indicated and not based on the presence of periodic limb movements alone.    Diagnostic Codes:   Obstructive Sleep Apnea G47.33            _____________________________________   Electronically Signed By: Teresa Burt MD 10/11/2018           Range(%) Time in range (min)   0.0 - 89.0 2.6   0.0 - 88.0 1.4         Stage Min(mm Hg) Max(mm Hg)   Wake - -   NREM(1+2+3) - -   REM - -       Range(mmHg) Time in range (min)   55.0 - 100.0 -   Excluded data <20.0 & >65.0 397.5

## 2018-10-12 ENCOUNTER — ALLIED HEALTH/NURSE VISIT (OUTPATIENT)
Dept: CARDIOLOGY | Facility: CLINIC | Age: 53
End: 2018-10-12
Attending: INTERNAL MEDICINE
Payer: COMMERCIAL

## 2018-10-12 DIAGNOSIS — R00.2 PALPITATIONS: ICD-10-CM

## 2018-10-12 DIAGNOSIS — I48.0 PAROXYSMAL ATRIAL FIBRILLATION (H): ICD-10-CM

## 2018-10-12 LAB — SLPCOMP: NORMAL

## 2018-10-12 PROCEDURE — 93272 ECG/REVIEW INTERPRET ONLY: CPT | Performed by: INTERNAL MEDICINE

## 2018-10-12 PROCEDURE — 93270 REMOTE 30 DAY ECG REV/REPORT: CPT | Mod: ZF

## 2018-10-12 NOTE — NURSING NOTE
Per Dr. Hendrickson, patient to have 30 days cardionet monitor placed.  Diagnosis: PAF  Monitor placed: Yes  Patient Instructed: Yes  Patient verbalized understanding: Yes  Holter # [id number]  Placed by: Ann Arenas CMA

## 2018-10-12 NOTE — MR AVS SNAPSHOT
After Visit Summary   10/12/2018    Manjinder Esparza    MRN: [id number]           Patient Information     Date Of Birth          1965        Visit Information        Provider Department      10/12/2018 2:00 PM Tech, [facility] Cvc Monitor, [facility]        Today's Diagnoses     Paroxysmal atrial fibrillation (H)        Palpitations           Follow-ups after your visit        Your next 10 appointments already scheduled     Nov 02, 2018  8:00 AM CDT   Return Sleep Patient with JOHNNY Cuenca CNP   [facility])    [address] [place] [id number] [phone]              Who to contact     If you have questions or need follow up information about today's clinic visit or your schedule please contact [facility] directly at [phone].  Normal or non-critical lab and imaging results will be communicated to you by Tran[facility]ilMedhart, letter or phone within 4 business days after the clinic has received the results. If you do not hear from us within 7 days, please contact the clinic through TranquilMedhart or phone. If you have a critical or abnormal lab result, we will notify you by phone as soon as possible.  Submit refill requests through [facility] or call your pharmacy and they will forward the refill request to us. Please allow 3 business days for your refill to be completed.          Additional Information About Your Visit        MyChart Information     [facility] gives you secure access to your electronic health record. If you see a primary care provider, you can also send messages to your care team and make appointments. If you have questions, please call your primary care clinic.  If you do not have a primary care provider, please call [phone] and they will assist you.        Care EveryWhere ID     This is your Care EveryWhere ID. This could be used by other organizations to access your [facility]  medical records  [id number]         Blood Pressure from Last 3 Encounters:   10/08/18 (!) 170/93   10/02/18 153/89   10/01/18 (!) 155/99    Weight from Last 3 Encounters:   10/08/18 87.1 kg (192 lb)   10/02/18 87.5 kg (193 lb)   10/01/18 88.1 kg (194 lb 3.2 oz)              We Performed the Following     Cardiac Event Monitor        Primary Care Provider Office Phone # Fax #    Liat S Jhoan, JOHNNY [phone] [address] [place] [address]        Equal Access to Services     [facility] WILDA : Hadii irasema huerta Soatul, waaxda luqadaha, qaybta kaalmada adeashishyada, tea beckford . So [facility] [phone].    ATENCIÓN: Si habla español, tiene a hastings disposición servicios gratuitos de asistencia lingüística. Llame al [phone].    We comply with applicable federal civil rights laws and [place] laws. We do not discriminate on the basis of race, color, national origin, age, disability, sex, sexual orientation, or gender identity.            Thank you!     Thank you for choosing [facility]  for your care. Our goal is always to provide you with excellent care. Hearing back from our patients is one way we can continue to improve our services. Please take a few minutes to complete the written survey that you may receive in the mail after your visit with us. Thank you!             Your Updated Medication List - Protect others around you: Learn how to safely use, store and throw away your medicines at www.disposemymeds.org.          This list is accurate as of 10/12/18  5:11 PM.  Always use your most recent med list.                   Brand Name Dispense Instructions for use Diagnosis    apixaban ANTICOAGULANT 5 MG tablet    ELIQUIS    60 tablet    Take 1 tablet (5 mg) by mouth 2 times daily    New onset a-fib (H)       metoprolol succinate 50 MG 24 hr tablet    TOPROL-XL    30 tablet    Take 1 tablet (50 mg) by mouth daily    HTN  (hypertension), Paroxysmal supraventricular tachycardia (H)       metoprolol tartrate 50 MG tablet    LOPRESSOR          ZANTAC PO           zolpidem 10 MG tablet    AMBIEN    1 tablet    Take tablet by mouth 15 minutes prior to sleep, for Sleep Study    Psychophysiological insomnia

## 2018-11-01 ENCOUNTER — PRE VISIT (OUTPATIENT)
Dept: SLEEP MEDICINE | Facility: CLINIC | Age: 53
End: 2018-11-01

## 2018-11-02 NOTE — TELEPHONE ENCOUNTER
Mr. Esparza is a 53-year-old male who was previously diagnosed with upper airway resistance syndrome on 10/23/2010.  At that time, he weighed 210 pounds.  He had a total of 2 obstructive, 5 centrals, 1 mixed, 7 hypopneas, and 53 RERAs noted, for an RDI of 11.5 and AHI of 2.5.  It appears also that there was excessive transient muscle activity in 2 epochs during REM sleep.  This would be suspicious for REM without atonia. He did have PLM with a PLM index of 6.3.  His PLM arousal index was 3.2.  He was provided with a CPAP which from getting information off the settings does look to be an APAP 5-15 cm of water.  Average pressure being used is 8.  Average leak 0.44.  Residual AHI 3.8.  Apnea index 0.8, hypopnea index 3.0.  It has been used 607 hours and the number of days used 109/3121.  His primary usage has been since July when he was diagnosed with atrial fib.  Average usage on his machine is 7 hours and 4 minutes.  I am unable to get any further data at this time.      Entering bed between 9:00 and 10:00 p.m. on workdays, staying up until around 12 on weekends.  Exits bed at 5:00 a.m. on workdays, 6:00 a.m. on weekends.  He does report that he often can get maybe an hour and a half, maybe 2-3 hours of sleep and then wake up somewhere between 1:30-2:00 a.m.  He will try to fall back asleep.  He will think. He will  his phone.  Sometimes he might watch TV and on occasion he just gets up to work.  This gives him an exceptionally short night on some nights. On weekends, 1-2 nights he will wake up and not be able to return to sleep.  He will use the TV.  He may go into work or start working on projects around his home.  He thinks that the machine is waking him up noise and being restless.  He tries to fall back asleep and sometimes goes elsewhere.  On sleep nights, he gets 5-6 hours, weekends 6-7.  Feels his best if he gets 7.  He does take a nap but it is very rare and he feels better after that.  In general, no  naps throughout the week.  Activities in bed do include using a phone or computer.     It is my impression that Mr. Esparza has several concerns with regard to his sleep.  He has a sleep study originally done in 2010, more than 8 years ago, which really did not indicate obstructive sleep apnea.  He has not felt very comfortable with CPAP as an option; however, he has not had the time to fully evaluate a different type of mask or a different device that could be utilized and the following plan of care has been developed:   1.  Upper airway resistance syndrome with reports of increase in apneic events, loud snoring, particularly worse if he is supine, and a STOP-Bang score of 7/8.  That gives him a high pretest probability for obstructive sleep apnea.  He has been having problems with driving, memory and concentration.  I have ordered an in-lab polysomnogram to occur with his pattern of exceptionally fragmented sleep along with the use of Ambien to improve diagnostic opportunity.  We have discussed all modalities of treatment.  He is heartened to knows that perhaps some mandibular advancement device might be something that he could qualify for.  He is open to most interventions and also knows that there have been significant changes made in CPAP masking and therapy.   2.  Sleep habits:  He is utilizing a significant amount of alcohol on weekends, which is further limiting his total sleep time, admitting to exceptionally poor sleep continuity in the later part of the night when he does awaken secondary to alcohol.  He has been encouraged to cut back so that he is not having any alcohol within 3-4 hours of bedtime. We will support him as he does make this process change.   3.  Likely psychophysiologic insomnia.  He has exceptional anxiety and stress at this point, certainly undergoing both changes in his health with his atrial fib and personal stresses.  He has not spoken with his primary care and is open for me to  provide a copy of his sleep consult to CHERYL MOSS for her evaluation and consideration for possible treatment.  I did tell him as well that cognitive behavioral therapy for insomnia works quite well.  In fact, better than meds; however, the level of stress has to be relatively well managed so that he is able to comply with some of the measures of stimulus control and sleep restriction which is utilized to improve sleep drive and relearn how to fall asleep and stay asleep in a reasonable manner.  He will keep sleep diaries or drop them off as I have asked for his sleep study to be done on a more urgent basis because of his driving problems.     9/10/18Arousal index was increased at 24.8 arousals per hour. Sleep efficiency was normal at 93.7%. Wake after sleep onset was 21.5 minutes. The patient spent 6.2% of total sleep time in Stage N1, 58.0% in Stage N2, 13.0% in Stage N3, and 22.8% in REM. Time in REM supine was 43.0 minutes.        Respiration: Mild supine predominant obstructive sleep apnea.    Events ? The polysomnogram revealed a presence of 5 obstructive, 9 central, and 0 mixed apneas resulting in an apnea index of 2.3 events per hour. There were 68 obstructive hypopneas and 0 central hypopneas resulting in an obstructive hypopnea index of 11.0 and central hypopnea index of 0 events per hour. The combined apnea/hypopnea index was 13.2 events per hour (central apnea/hypopnea index was 1.4 events per hour). The REM AHI was 16.2 events per hour. The supine AHI was 20.0 events per hour. The RERA index was 11.9 events per hour.  The RDI was 25.1 events per hour.    Snoring - was reported as moderately loud and frequent.    Respiratory rate and pattern - was notable for normal respiratory rate and pattern.    Sustained Sleep Associated Hypoventilation - Transcutaneous carbon dioxide monitoring was not used, however significant hypoventilation was not suggested by oximetry.  Sleep Associated Hypoxemia - (Greater  than 5 minutes O2 sat at or below 88%) was not present. Baseline oxygen saturation was 95.2%. Lowest oxygen saturation was 83.3%. Time spent less than or equal to 88% was 1.4 minutes.

## 2018-11-05 DIAGNOSIS — I47.10 PAROXYSMAL SUPRAVENTRICULAR TACHYCARDIA (H): ICD-10-CM

## 2018-11-05 DIAGNOSIS — I10 HYPERTENSION, UNSPECIFIED TYPE: ICD-10-CM

## 2018-11-05 RX ORDER — METOPROLOL SUCCINATE 50 MG/1
50 TABLET, EXTENDED RELEASE ORAL DAILY
Qty: 90 TABLET | Refills: 11 | Status: SHIPPED | OUTPATIENT
Start: 2018-11-05 | End: 2019-01-30

## 2018-11-05 RX ORDER — METOPROLOL TARTRATE 50 MG
TABLET ORAL
Qty: 60 TABLET | OUTPATIENT
Start: 2018-11-05

## 2018-11-20 ENCOUNTER — MYC MEDICAL ADVICE (OUTPATIENT)
Dept: CARDIOLOGY | Facility: CLINIC | Age: 53
End: 2018-11-20

## 2018-11-30 NOTE — TELEPHONE ENCOUNTER
Date: 11/29/2018    Time of Call: 7:19 PM     Diagnosis: reviewed heart monitor with Dr Hendrickson     [ TORB ] Ordering provider: Dr Hendrickson   Order:   Ok to stop apixaban  Follow up with NP in 6 months     Order received by: Simi Tellez RN      Follow-up/additional notes: message sent to pt

## 2019-01-29 DIAGNOSIS — I47.10 PAROXYSMAL SUPRAVENTRICULAR TACHYCARDIA (H): ICD-10-CM

## 2019-01-29 DIAGNOSIS — I10 HYPERTENSION, UNSPECIFIED TYPE: ICD-10-CM

## 2019-01-30 RX ORDER — METOPROLOL SUCCINATE 50 MG/1
50 TABLET, EXTENDED RELEASE ORAL DAILY
Qty: 90 TABLET | Refills: 3 | Status: SHIPPED | OUTPATIENT
Start: 2019-01-30 | End: 2020-02-04

## 2019-07-18 ENCOUNTER — OFFICE VISIT (OUTPATIENT)
Dept: FAMILY MEDICINE | Facility: CLINIC | Age: 54
End: 2019-07-18
Payer: COMMERCIAL

## 2019-07-18 VITALS
DIASTOLIC BLOOD PRESSURE: 91 MMHG | RESPIRATION RATE: 18 BRPM | WEIGHT: 191 LBS | BODY MASS INDEX: 25.9 KG/M2 | OXYGEN SATURATION: 100 % | HEART RATE: 70 BPM | SYSTOLIC BLOOD PRESSURE: 137 MMHG

## 2019-07-18 DIAGNOSIS — K21.9 GERD WITHOUT ESOPHAGITIS: ICD-10-CM

## 2019-07-18 DIAGNOSIS — R20.2 PARESTHESIA: ICD-10-CM

## 2019-07-18 DIAGNOSIS — Z13.29 SCREENING FOR THYROID DISORDER: ICD-10-CM

## 2019-07-18 DIAGNOSIS — Z12.5 SCREENING FOR PROSTATE CANCER: ICD-10-CM

## 2019-07-18 DIAGNOSIS — R91.8 PULMONARY NODULES: ICD-10-CM

## 2019-07-18 DIAGNOSIS — Z13.220 SCREENING FOR HYPERLIPIDEMIA: ICD-10-CM

## 2019-07-18 DIAGNOSIS — I10 BENIGN ESSENTIAL HYPERTENSION: Primary | ICD-10-CM

## 2019-07-18 DIAGNOSIS — I48.91 NEW ONSET A-FIB (H): ICD-10-CM

## 2019-07-18 DIAGNOSIS — Z13.1 SCREENING FOR DIABETES MELLITUS: ICD-10-CM

## 2019-07-18 PROCEDURE — 80061 LIPID PANEL: CPT | Performed by: NURSE PRACTITIONER

## 2019-07-18 PROCEDURE — 80053 COMPREHEN METABOLIC PANEL: CPT | Performed by: NURSE PRACTITIONER

## 2019-07-18 PROCEDURE — 36415 COLL VENOUS BLD VENIPUNCTURE: CPT | Performed by: NURSE PRACTITIONER

## 2019-07-18 PROCEDURE — 82306 VITAMIN D 25 HYDROXY: CPT | Performed by: NURSE PRACTITIONER

## 2019-07-18 PROCEDURE — G0103 PSA SCREENING: HCPCS | Performed by: NURSE PRACTITIONER

## 2019-07-18 PROCEDURE — 84443 ASSAY THYROID STIM HORMONE: CPT | Performed by: NURSE PRACTITIONER

## 2019-07-18 PROCEDURE — 99214 OFFICE O/P EST MOD 30 MIN: CPT | Performed by: NURSE PRACTITIONER

## 2019-07-18 ASSESSMENT — ENCOUNTER SYMPTOMS
ABDOMINAL PAIN: 0
COUGH: 0
SORE THROAT: 0
EYE PAIN: 0
WEAKNESS: 0
HEMATOCHEZIA: 0
HEADACHES: 1
CHILLS: 0
DIZZINESS: 0
DIARRHEA: 0
MYALGIAS: 1
HEMATURIA: 0
PALPITATIONS: 1
FREQUENCY: 0
DYSURIA: 0
CONSTIPATION: 0
FEVER: 0
NAUSEA: 0
HEARTBURN: 1
NERVOUS/ANXIOUS: 1
PARESTHESIAS: 1
ARTHRALGIAS: 1
SHORTNESS OF BREATH: 0
JOINT SWELLING: 0

## 2019-07-18 NOTE — PROGRESS NOTES
"Subjective     Manjinder Esparza is a 54 year old male who presents to clinic today for the following health issues:    Chief Complaint   Patient presents with     Hypertension     Atrial Fib     Numbness     Lung Nodule         Healthy Habits:     Getting at least 3 servings of Calcium per day:  Yes    Bi-annual eye exam:  NO    Dental care twice a year:  Yes    Sleep apnea or symptoms of sleep apnea:  Daytime drowsiness, Excessive snoring and Sleep apnea    Diet:  Low salt    Frequency of exercise:  None    Taking medications regularly:  Yes    Medication side effects:  Muscle aches, Lightheadedness and Other    PHQ-2 Total Score: 2    Additional concerns today:  Yes     Tobacco  Allergies  Meds  Problems  Med Hx  Surg Hx  Fam Hx         A-fib  Last year:  Since that time, he has had prolems with a numb/tingly sensation to his left upper arm.  The area of numbness/tingling is not changing.  His A. fib is controlled.  He sees cardiology for A. fib management and he is due a recheck appointment soon.  He sometimes will have a chest discomfort that he links with his GERD.    Hands fall asleep at night.  Intermittent.  Computer work.  Will happen for a week or so and then it will stop.    Heartburn:  Getting him down.  Ranitidine helps.  He does not take ranitidine regularly, only as needed.    Hot spots on his head that feels similar numbness and tingling to the area on his left upper arm.  Top/middle head.  No scalp irritation or problems.    Anxiety:  He notices it more when he lays down.  He has difficulty turning his brain off at times.    Sleep apnea:  He has an old CPAP but \"I don't like it.\"  A recent sleep study was done and he has a follow up.    Pulmonary nodule:  Found on previous CT.  Never a smoker.  No SOB.  He was instructed to have another CT scan done and he is due at this time.    Blood pressure:  He has been on his metoprolol daily.  He denies any side effects.  He does know that this slows " down his heart rate and it has not changed his ability to exercise and workout.    Review of Systems   Constitutional: Negative for chills and fever.   HENT: Positive for ear pain. Negative for congestion, hearing loss and sore throat.    Eyes: Positive for visual disturbance. Negative for pain.   Respiratory: Negative for cough and shortness of breath.    Cardiovascular: Positive for chest pain and palpitations. Negative for peripheral edema.   Gastrointestinal: Positive for heartburn. Negative for abdominal pain, constipation, diarrhea, hematochezia and nausea.   Genitourinary: Negative for discharge, dysuria, frequency, genital sores, hematuria, impotence and urgency.   Musculoskeletal: Positive for arthralgias and myalgias. Negative for joint swelling.   Skin: Negative for rash.   Neurological: Positive for headaches and paresthesias. Negative for dizziness and weakness.   Psychiatric/Behavioral: Negative for mood changes. The patient is nervous/anxious.           Objective    BP (!) 137/91 (BP Location: Right arm, Patient Position: Sitting, Cuff Size: Adult Regular)   Pulse 70   Resp 18   Wt 86.6 kg (191 lb)   SpO2 100%   BMI 25.90 kg/m    Body mass index is 25.9 kg/m .  Physical Exam   GENERAL: healthy, alert and no distress  EYES: Eyes grossly normal to inspection, PERRL and conjunctivae and sclerae normal  HENT: ear canals and TM's normal, nose and mouth without ulcers or lesions  NECK: no adenopathy, no asymmetry, masses, or scars and thyroid normal to palpation  RESP: lungs clear to auscultation - no rales, rhonchi or wheezes  CV: regular rate and rhythm, normal S1 S2, no S3 or S4, no murmur, click or rub, no peripheral edema and peripheral pulses strong  ABDOMEN: soft, nontender, no hepatosplenomegaly, no masses and bowel sounds normal  MS: no gross musculoskeletal defects noted, no edema.  Bilateral upper extremities negative Tinel's and Phalen signs.  C WMS intact to hands and feet.   SKIN: no  suspicious lesions or rashes.  Skin is warm and dry.  NEURO: Normal strength and tone, mentation intact and speech normal  PSYCH: mentation appears normal, affect normal/bright    Diagnostic Test Results:  Labs reviewed in Epic        Assessment & Plan     (I10) Benign essential hypertension  (primary encounter diagnosis)  Comment:   Plan: Comprehensive metabolic panel        He will continue his metoprolol daily as prescribed.  He is due a cardiology follow-up appointment regarding his A. fib and I anticipate they will  readdress his blood pressure at that appointment.    His metoprolol is managed by cardiology.    (I48.91) New onset a-fib (H)  Comment: History of/managed by cardiology  Plan: Comprehensive metabolic panel, TSH with free T4        reflex        Continue care with cardiology.    (R91.8) Pulmonary nodule, 2mm, RML  Comment: History of  Plan: CT Chest Low Dose Non Contrast        I did go ahead and order a CT scan to follow-up on his pulmonary nodule.  He is to follow-up with a CT scan at earliest convenience.  I did discuss with him that if this next CT scan shows that the nodule  If it is stable in size and is not changing, he will likely not need another CT scan for follow-up.  He agrees and understands and I will wait to see the results of the CT sca for final decision of follow-up.n    (R20.2) Paresthesia  Comment: Uncertain  Plan: Comprehensive metabolic panel, Vitamin D         Deficiency, NEUROLOGY ADULT REFERRAL        Mekhi discussed the several areas on his body that he has the paresthesias including his left upper arm, the top of his head, and his hands are falling asleep.  Because of this, I did do some lab studies and asked him to follow-up with the neurologist for a consultation.  He agrees and thinks this is a great idea.  He will follow-up with Dr. Romero at his earliest convenience.    (K21.9) GERD without esophagitis  Comment:   Plan: ranitidine (ZANTAC) 150 MG tablet        I  did discuss with Manjinder the importance of GERD management, keeping symptoms under control, lifestyle management, and ranitidine.  I did tell him that at times when his GERD is acting up, to take the  Ranitidine twice a day regularly.  I also encouraged him to avoid food triggers.  In the event that his GERD continues, I would recommend that he have an upper GI endoscopy, but this does not appear necessary at this time.  He will let me know if any problems, otherwise we will discuss again his follow-up appoint with me in 1 year.    (Z13.29) Screening for thyroid disorder  Comment:   Plan: TSH with free T4 reflex            (Z13.220) Screening for hyperlipidemia  Comment:   Plan: Lipid panel reflex to direct LDL Fasting        Labs done    (Z12.5) Screening for prostate cancer  Comment:   Plan: PSA, screen        PSA pending    (Z13.1) Screening for diabetes mellitus  Comment:   Plan: Labs pending    See Patient Instructions    Return in about 1 year (around 7/18/2020) for Physical Exam.    JOHNNY Taylor [facility]

## 2019-07-19 LAB
ALBUMIN SERPL-MCNC: 3.7 G/DL (ref 3.4–5)
ALP SERPL-CCNC: 57 U/L (ref 40–150)
ALT SERPL W P-5'-P-CCNC: 24 U/L (ref 0–70)
ANION GAP SERPL CALCULATED.3IONS-SCNC: 7 MMOL/L (ref 3–14)
AST SERPL W P-5'-P-CCNC: 16 U/L (ref 0–45)
BILIRUB SERPL-MCNC: 0.6 MG/DL (ref 0.2–1.3)
BUN SERPL-MCNC: 17 MG/DL (ref 7–30)
CALCIUM SERPL-MCNC: 8.5 MG/DL (ref 8.5–10.1)
CHLORIDE SERPL-SCNC: 112 MMOL/L (ref 94–109)
CHOLEST SERPL-MCNC: 145 MG/DL
CO2 SERPL-SCNC: 23 MMOL/L (ref 20–32)
CREAT SERPL-MCNC: 1.09 MG/DL (ref 0.66–1.25)
DEPRECATED CALCIDIOL+CALCIFEROL SERPL-MC: 37 UG/L (ref 20–75)
GFR SERPL CREATININE-BSD FRML MDRD: 77 ML/MIN/{1.73_M2}
GLUCOSE SERPL-MCNC: 102 MG/DL (ref 70–99)
HDLC SERPL-MCNC: 36 MG/DL
LDLC SERPL CALC-MCNC: 80 MG/DL
NONHDLC SERPL-MCNC: 109 MG/DL
POTASSIUM SERPL-SCNC: 3.9 MMOL/L (ref 3.4–5.3)
PROT SERPL-MCNC: 7.1 G/DL (ref 6.8–8.8)
PSA SERPL-ACNC: 1.35 UG/L (ref 0–4)
SODIUM SERPL-SCNC: 142 MMOL/L (ref 133–144)
TRIGL SERPL-MCNC: 145 MG/DL
TSH SERPL DL<=0.005 MIU/L-ACNC: 2.22 MU/L (ref 0.4–4)

## 2019-07-19 NOTE — RESULT ENCOUNTER NOTE
Cuong Gaming,    This notice let you know the results of your recent lab studies.    Your prostate screening antigen/PSA, thyroid-stimulating hormone/TSH, and vitamin D screening are all negative or normal.    On your comprehensive metabolic panel, things look very good overall.  Your kidney function, liver function, and calcium levels are normal.  With your electrolytes, the chloride level came back slightly high and your blood sugar came back just above the normal limit of 90 night at 102.  These are not overly concerning, but I would recommend the eat a healthy diet, continue your regular aerobic activity, and I will recheck your labs in 1 year.      Let me know if you have any questions.  Otherwise take good care of yourself.  Happy summer!    Liat TURNER CNP

## 2019-08-13 ENCOUNTER — OFFICE VISIT (OUTPATIENT)
Dept: SLEEP MEDICINE | Facility: CLINIC | Age: 54
End: 2019-08-13
Payer: COMMERCIAL

## 2019-08-13 VITALS
HEIGHT: 72 IN | WEIGHT: 193 LBS | SYSTOLIC BLOOD PRESSURE: 136 MMHG | DIASTOLIC BLOOD PRESSURE: 94 MMHG | BODY MASS INDEX: 26.14 KG/M2 | RESPIRATION RATE: 16 BRPM | OXYGEN SATURATION: 98 % | HEART RATE: 69 BPM

## 2019-08-13 DIAGNOSIS — I10 ESSENTIAL HYPERTENSION: ICD-10-CM

## 2019-08-13 DIAGNOSIS — G47.33 OSA (OBSTRUCTIVE SLEEP APNEA): Primary | ICD-10-CM

## 2019-08-13 DIAGNOSIS — E66.3 OVERWEIGHT (BMI 25.0-29.9): ICD-10-CM

## 2019-08-13 PROCEDURE — 99214 OFFICE O/P EST MOD 30 MIN: CPT | Performed by: NURSE PRACTITIONER

## 2019-08-13 ASSESSMENT — MIFFLIN-ST. JEOR: SCORE: 1758.44

## 2019-08-13 NOTE — PROGRESS NOTES
Cc: Patient returns here today in follow up for discussion on obtaining new cpap supplies, and discussing last year's polysomnogram    HPI: History of previous polysomnogram indicating upper airway resistance syndrome..  Baseline symptoms were: Snoring, bed partner needing to sleep separately, paroxysmal atrial fib, and currently blood pressures is running quite high 9/10/2018 PSG revealed mild, supine predominant obstructive sleep apnea with AHI 13.2 events per hour, Supine AHI 20, Normal sleep efficiency with mildly increased arousal index., No abnormal movement activity. REM atonia preserved.  Normal sinus rhythm and rates. Comorbidities: see below, excessive alcohol intake on weekends, stress.  Sleep comorbidities insufficient sleep in past, difficulty with driving  DME: Probable [facility], patient reports it was self-pay as he did not meet an AHI of 5 or greater.  Last supplies/mask: 2008: He has old CPAP that has little use on its.  When seen last year prior to the polysomnogram he had been able to use it for over 7 hours per night, however further details on download were not available.    New concerns/changes in health: Increased concern for moving forward with treatment of obstructive sleep apnea as he and bed partner need to sleep separately, he has atrial fib,    CPAP Review of Systems: the following SYMPTOMS/CONCERNS have been reviewed are negative with exception of the following:     Equipment issues: Uncertain of availability of parts, but he knows he needs a new humidifier and supplies.  He would like to consider a different type of mask than what he was using previously.        SLEEP SCHEDULE: REVIEW IS NEGATIVE WITH EXCEPTION OF THE FOLLOWING:     TST:devoting >=7 or more hours to sleep: Likely at times     Problems falling or staying asleep with cpap: Likely difficulty maintaining sleep at end of night without    Naps:     RLS::     : SLEEP SCHEDULE::   Bedtime: Between 10 and 11 on  workdays                       range: Later on weekends                  sleep Latency:  Wakeups:4:45 wk day, 6 on weekends  Refall  Final wakeup:  Estimated total sleep time approximating 7 hours on workdays, less on weekends    Download today: Machine is not downloadable at this point see visit from 2018      Full report scanned into medical record    Response to therapy: Was not use to mask, and did not give much effort to using the device as he snored and did not have sleep apnea  Willingness to continue he would like to proceed with CPAP initially as it he knows it does cover both snoring and apnea  Allergies:    Allergies   Allergen Reactions     Lisinopril      cough       Medications:    Current Outpatient Medications   Medication Sig Dispense Refill     metoprolol succinate ER (TOPROL-XL) 50 MG 24 hr tablet Take 1 tablet (50 mg) by mouth daily 90 tablet 3     ranitidine (ZANTAC) 150 MG tablet Take 1 tablet (150 mg) by mouth 2 times daily         Problem List:  Patient Active Problem List    Diagnosis Date Noted     Pulmonary nodule, 2mm, RML 07/19/2018     Priority: Medium     2mm, RML       Benign lipomatous neoplasm of skin and subcutaneous tissue of trunk 07/19/2018     Priority: Medium     New onset a-fib (H) 06/28/2018     Priority: Medium     Benign essential hypertension 11/07/2016     Priority: Medium     CARDIOVASCULAR SCREENING; LDL GOAL LESS THAN 160 05/09/2010     Priority: Medium     Per provider          Past Medical/Surgical History:  Past Medical History:   Diagnosis Date     Atrial fibrillation (H)      Gastroesophageal reflux disease      Hypertension      Past Surgical History:   Procedure Laterality Date     ANESTHESIA CARDIOVERSION N/A 6/29/2018    Procedure: ANESTHESIA CARDIOVERSION;  Cardioversion;  Surgeon: GENERIC ANESTHESIA PROVIDER;  Location: [facility] OR     SURGICAL HISTORY OF -       animal bone graft in the left lower jaw           Physical Examination: Original blood pressure was  higher than posted here, this is the recheck which was slightly lower.  Vitals: BP (!) 136/94   Pulse 69   Resp 16   Ht 1.829 m (6')   Wt 87.5 kg (193 lb)   SpO2 98%   BMI 26.18 kg/m    BMI= Body mass index is 26.18 kg/m .         [place] Total Score 8/13/2019   Total score - [place] 16       GENERAL APPEARANCE: healthy, alert and no distress    A/P: 2018 polysomnogram positive for mild obstructive sleep apnea with a positional component.  As patient has old CPAP with out many hours on it as he did not seem to get use to the mask, but is aware of reliability of CPAP with regard to covering both snoring and apnea, (and in the setting of paroxysmal atrial fibrillation and rising blood pressure) he wishes to restart on therapy with gaining new supplies in a water chamber..  He indicates that he did self pay for his CPAP and supplies initially in 2008.      1. CAIO: Treatment options were discussed and patient chooses to go with CPAP, and follow-up in clinic to assist with compliance and response to therapy.  I have given him a path to adapt to CPAP and have written order for him to get a mask fit and supplies updated at [facility].  He will see me back in approximately 7 weeks.    2.  Overweight: Likely little impact with regard to his mild disease  3.  Hypertension:Manjinder to follow up with Primary Care provider regarding elevated blood pressure.  4. Nicotine dependence: Denies    Time spent with patient is 25 minutes, of which >50% was spent in counseling, education and coordination of care.

## 2019-08-13 NOTE — PATIENT INSTRUCTIONS
"MY TREATMENT INFORMATION FOR SLEEP APNEA-  Manjinder Esparza    NP: Niru Banegas [place]  SLEEP CENTER :  [place]    MY CONTACT NUMBER:  [phone]    Manjinder to follow up with Primary Care provider regarding elevated blood pressure.  We did discuss the importance of treating hypertension in the setting of atrial fibrillation.  I recommended following up with Dr. MATA    You should hear from someone by Wed, Thurs, Friday  at the latest.  When you get your equipment, it may help to practice with your cpap when you first get home for work for 10-15 mins-read a book or listen to music.  Then move to the bedside for the night's rest.    Our sleep therapy management group will follow you closely-please call them back when they contact you with questions you have on adapting to cpap.  They are here to help.    Please don't drive if sleepy, it may take awhile till your sleepiness resolves.    I'll see you back in clinic about 7 wks after your set up.    Am I having a sleep study at a sleep center?  Make sure you have an appointment for the study before you leave!    Am I having a home sleep study?  Watch this video:  [email or web address]/watch?v=CteI_[email or web address]&[email or web address]  Please verify your insurance coverage with your insurance carrier    Frequently asked questions:  1. What is Obstructive Sleep Apnea (CAIO)? CAIO is the most common type of sleep apnea. Apnea means, \"without breath.\"  Apnea is most often caused by narrowing or collapse of the upper airway as muscles relax during sleep.   Almost everyone has occasional apneas. Most people with sleep apnea have had brief interruptions at night frequently for many years.  The severity of sleep apnea is related to how frequent and severe the events are.   2. What are the consequences of CAIO? Symptoms include: feeling sleepy during the day, snoring loudly, gasping or stopping of breathing, trouble sleeping, and occasionally morning headaches or " heartburn at night.  Sleepiness can be serious and even increase the risk of falling asleep while driving. Other health consequences may include development of high blood pressure and other cardiovascular disease in persons who are susceptible. Untreated CAIO  can contribute to heart disease, stroke and diabetes.   3. What are the treatment options? In most situations, sleep apnea is a lifelong disease that must be managed with daily therapy. Medications are not effective for sleep apnea and surgery is generally not considered until other therapies have been tried. Your treatment is your choice . Continuous Positive Airway (CPAP) works right away and is the therapy that is effective in nearly everyone. An oral device to hold your jaw forward is usually the next most reliable option. Other options include postioning devices (to keep you off your back), weight loss, and surgery including a tongue pacing device. There is more detail about some of these options below.    Important tips for using CPAP and similar devices   Know your equipment:  CPAP is continuous positive airway pressure that prevents obstructive sleep apnea by keeping the throat from collapsing while you are sleeping. In most cases, the device is  smart  and can slowly self-adjusts if your throat collapses and keeps a record every day of how well you are treated-this information is available to you and your care team.  BPAP is bilevel positive airway pressure that keeps your throat open and also assists each breath with a pressure boost to maintain adequate breathing.  Special kinds of BPAP are used in patients who have inadequate breathing from lung or heart disease. In most cases, the device is  smart  and can slowly self-adjusts to assist breathing. Like CPAP, the device keeps a record of how well you are treated.  Your mask is your connection to the device. You get to choose what feels most comfortable and the staff will help to make sure if fits.  Here: are some examples of the different masks that are available:       Key points to remember on your journey with sleep apnea:  1. Sleep study.  PAP devices often need to be adjusted during a sleep study to show that they are effective and adjusted right.  2. Good tips to remember: Try wearing just the mask during a quiet time during the day so your body adapts to wearing it. A humidifier is recommended for comfort in most cases to prevent drying of your nose and throat. Allergy medication from your provider may help you if you are having nasal congestion.  3. Getting settled-in. It takes more than one night for most of us to get used to wearing a mask. Try wearing just the mask during a quiet time during the day so your body adapts to wearing it. A humidifier is recommended for comfort in most cases. Our team will work with you carefully on the first day and will be in contact within 4 days and again at 2 and 4 weeks for advice and remote device adjustments. Your therapy is evaluated by the device each day.   4. Use it every night. The more you are able to sleep naturally for 7-8 hours, the more likely you will have good sleep and to prevent health risks or symptoms from sleep apnea. Even if you use it 4 hours it helps. Occasionally all of us are unable to use a medical therapy, in sleep apnea, it is not dangerous to miss one night.   5. Communicate. Call our skilled team on the number provided on the first day if your visit for problems that make it difficult to wear the device. Over 2 out of 3 patients can learn to wear the device long-term with help from our team. Remember to call our team or your sleep providers if you are unable to wear the device as we may have other solutions for those who cannot adapt to mask CPAP therapy. It is recommended that you sleep your sleep provider within the first 3 months and yearly after that if you are not having problems.   6. Use it for your health. We encourage use of  CPAP masks during daytime quiet periods to allow your face and brain to adapt to the sensation of CPAP so that it will be a more natural sensation to awaken to at night or during naps. This can be very useful during the first few weeks or months of adapting to CPAP though it does not help medically to wear CPAP during wakefulness and  should not be used as a strategy just to meet guidelines.  7. Take care of your equipment. Make sure you clean your mask and tubing using directions every day and that your filter and mask are replaced as recommended or if they are not working.     BESIDES CPAP, WHAT OTHER THERAPIES ARE THERE?    Positioning Device  Positioning devices are generally used when sleep apnea is mild and only occurs on your back.This example shows a pillow that straps around the waist. It may be appropriate for those whose sleep study shows milder sleep apnea that occurs primarily when lying flat on one's back. Preliminary studies have shown benefit but effectiveness at home may need to be verified by a home sleep test. These devices are generally not covered by medical insurance.  Examples of devices that maintain sleeping on the back to prevent snoring and mild sleep apnea.    Belt type body positioner  Http://Gravy/    Electronic reminder  Http://nightshifttherapy.com/  Http://[email or web address]/      Oral Appliance  What is oral appliance therapy?  An oral appliance device fits on your teeth at night like a retainer used after having braces. The device is made by a specialized dentist and requires several visits over 1-2 months before a manufactured device is made to fit your teeth and is adjusted to prevent your sleep apnea. Once an oral device is working properly, snoring should be improved. A home sleep test may be recommended at that time if to determine whether the sleep apnea is adequately treated.       Some things to remember:  -Oral devices are often, but not always, covered by your  medical insurance. Be sure to check with your insurance provider.   -If you are referred for oral therapy, you will be given a list of specialized dentists to consider or you may choose to visit the Web site of the American Academy of Dental Sleep Medicine  -Oral devices are less likely to work if you have severe sleep apnea or are extremely overweight.     More detailed information  An oral appliance is a small acrylic device that fits over the upper and lower teeth  (similar to a retainer or a mouth guard). This device slightly moves jaw forward, which moves the base of the tongue forward, opens the airway, improves breathing for effective treat snoring and obstructive sleep apnea in perhaps 7 out of 10 people .  The best working devices are custom-made by a dental device  after a mold is made of the teeth 1, 2, 3.  When is an oral appliance indicated?  Oral appliance therapy is recommended as a first-line treatment for patients with primary snoring, mild sleep apnea, and for patients with moderate sleep apnea who prefer appliance therapy to use of CPAP4, 5. Severity of sleep apnea is determined by sleep testing and is based on the number of respiratory events per hour of sleep.   How successful is oral appliance therapy?  The success rate of oral appliance therapy in patients with mild sleep apnea is 75-80% while in patients with moderate sleep apnea it is 50-70%. The chance of success in patients with severe sleep apnea is 40-50%. The research also shows that oral appliances have a beneficial effect on the cardiovascular health of CAIO patients at the same magnitude as CPAP therapy7.  Oral appliances should be a second-line treatment in cases of severe sleep apnea, but if not completely successful then a combination therapy utilizing CPAP plus oral appliance therapy may be effective. Oral appliances tend to be effective in a broad range of patients although studies show that the patients who have the  highest success are females, younger patients, those with milder disease, and less severe obesity. 3, 6.   Finding a dentist that practices dental sleep medicine  Specific training is available through the American Academy of Dental Sleep Medicine for dentists interested in working in the field of sleep. To find a dentist who is educated in the field of sleep and the use of oral appliances, near you, visit the Web site of the American Academy of Dental Sleep Medicine.    References  1. Carmella, et al. Objectively measured vs self-reported compliance during oral appliance therapy for sleep-disordered breathing. Chest 2013; 144(5): 3394-7584.  2. Elissa, et al. Objective measurement of compliance during oral appliance therapy for sleep-disordered breathing. Thorax 2013; 68(1): 91-96.  3. Gavin et al. Mandibular advancement devices in 620 men and women with CAIO and snoring: tolerability and predictors of treatment success. Chest 2004; 125: 8540-9254.  4. Jak, et al. Oral appliances for snoring and CAIO: a review. Sleep 2006; 29: 244-262.  5. Gabriel et al. Oral appliance treatment for CAIO: an update. J Clin Sleep Med 2014; 10(2): 215-227.  6. Federico et al. Predictors of OSAH treatment outcome. J Dent Res 2007; 86: 6373-9850.      Weight Loss:    Weight loss is a long-term strategy that may improve sleep apnea in some patients.    Weight management is a personal decision and the decision should be based on your interest and the potential benefits.  If you are interested in exploring weight loss strategies, the following discussion covers the impact on weight loss on sleep apnea and the approaches that may be successful.    Being overweight does not necessarily mean you will have health consequences.  Those who have BMI over 35 or over 27 with existing medical conditions carries greater risk.   Weight loss decreases severity of sleep apnea in most people with obesity. For those with mild obesity who  have developed snoring with weight gain, even 15-30 pound weight loss can improve and occasionally eliminate sleep apnea.  Structured and life-long dietary and health habits are necessary to lose weight and keep healthier weight levels.     Though there may be significant health benefits from weight loss, long-term weight loss is very difficult to achieve- studies show success with dietary management in less than 10% of people. In addition, substantial weight loss may require years of dietary control and may be difficult if patients have severe obesity. In these cases, surgical management may be considered.  Finally, older individuals who have tolerated obesity without health complications may be less likely to benefit from weight loss strategies.      [unfilled]    Surgery:    Surgery for obstructive sleep apnea is considered generally only when other therapies fail to work. Surgery may be discussed with you if you are having a difficult time tolerating CPAP and or when there is an abnormal structure that requires surgical correction.  Nose and throat surgeries often enlarge the airway to prevent collapse.  Most of these surgeries create pain for 1-2 weeks and up to half of the most common surgeries are not effective throughout life.  You should carefully discuss the benefits and drawbacks to surgery with your sleep provider and surgeon to determine if it is the best solution for you.   More information  Surgery for CAIO is directed at areas that are responsible for narrowing or complete obstruction of the airway during sleep.  There are a wide range of procedures available to enlarge and/or stabilize the airway to prevent blockage of breathing in the three major areas where it can occur: the palate, tongue, and nasal regions.  Successful surgical treatment depends on the accurate identification of the factors responsible for obstructive sleep apnea in each person.  A personalized approach is required because there  is no single treatment that works well for everyone.  Because of anatomic variation, consultation with an examination by a sleep surgeon is a critical first step in determining what surgical options are best for each patient.  In some cases, examination during sedation may be recommended in order to guide the selection of procedures.  Patients will be counseled about risks and benefits as well as the typical recovery course after surgery. Surgery is typically not a cure for a person s CAIO.  However, surgery will often significantly improve one s CAIO severity (termed  success rate ).  Even in the absence of a cure, surgery will decrease the cardiovascular risk associated with OSA7; improve overall quality of life8 (sleepiness, functionality, sleep quality, etc).      Palate Procedures:  Patients with CAIO often have narrowing of their airway in the region of their tonsils and uvula.  The goals of palate procedures are to widen the airway in this region as well as to help the tissues resist collapse.  Modern palate procedure techniques focus on tissue conservation and soft tissue rearrangement, rather than tissue removal.  Often the uvula is preserved in this procedure. Residual sleep apnea is common in patient after pharyngoplasty with an average reduction in sleep apnea events of 33%2.      Tongue Procedures:  ExamWhile patients are awake, the muscles that surround the throat are active and keep this region open for breathing. These muscles relax during sleep, allowing the tongue and other structures to collapse and block breathing.  There are several different tongue procedures available.  Selection of a tongue base procedure depends on characteristics seen on physical exam.  Generally, procedures are aimed at removing bulky tissues in this area or preventing the back of the tongue from falling back during sleep.  Success rates for tongue surgery range from 50-62%3.    Hypoglossal Nerve Stimulation:  Hypoglossal  nerve stimulation has recently received approval from the [place] Food and Drug Administration for the treatment of obstructive sleep apnea.  This is based on research showing that the system was safe and effective in treating sleep apnea6.  Results showed that the median AHI score decreased 68%, from 29.3 to 9.0. This therapy uses an implant system that senses breathing patterns and delivers mild stimulation to airway muscles, which keeps the airway open during sleep.  The system consists of three fully implanted components: a small generator (similar in size to a pacemaker), a breathing sensor, and a stimulation lead.  Using a small handheld remote, a patient turns the therapy on before bed and off upon awakening.    Candidates for this device must be greater than 22 years of age, have moderate to severe CAIO (AHI between 20-65), BMI less than 32, have tried CPAP/oral appliance without success, and have appropriate upper airway anatomy (determined by a sleep endoscopy performed by Dr. Nguyen).    Hypoglossal Nerve Stimulation Pathway:    The sleep surgeon s office will work with the patient through the insurance prior-authorization process (including communications and appeals).    Nasal Procedures:  Nasal obstruction can interfere with nasal breathing during the day and night.  Studies have shown that relief of nasal obstruction can improve the ability of some patients to tolerate positive airway pressure therapy for obstructive sleep apnea1.  Treatment options include medications such as nasal saline, topical corticosteroid and antihistamine sprays, and oral medications such as antihistamines or decongestants. Non-surgical treatments can include external nasal dilators for selected patients. If these are not successful by themselves, surgery can improve the nasal airway either alone or in combination with these other options.      Combination Procedures:  Combination of surgical procedures and other  treatments may be recommended, particularly if patients have more than one area of narrowing or persistent positional disease.  The success rate of combination surgery ranges from 66-80%2,3.    References  1. Uriah ZHONG. The Role of the Nose in Snoring and Obstructive Sleep Apnoea: An Update.  Eur Arch Otorhinolaryngol. 2011; 268: 1365-73.  2.  Berenice SM; Joey JA; Chiquita JR; Pallanch JF; Jeremy MB; Brandi SG; Kady FENG. Surgical modifications of the upper airway for obstructive sleep apnea in adults: a systematic review and meta-analysis. SLEEP 2010;33(10):1198-3130. Juancarlos TAI. Hypopharyngeal surgery in obstructive sleep apnea: an evidence-based medicine review.  Arch Otolaryngol Head Neck Surg. 2006 Feb;132(2):206-13.  3. Roddy YH1, Dony Y, Grant JILLIAN. The efficacy of anatomically based multilevel surgery for obstructive sleep apnea. Otolaryngol Head Neck Surg. 2003 Oct;129(4):327-35.  4. Kezirian E, Goldberg A. Hypopharyngeal Surgery in Obstructive Sleep Apnea: An Evidence-Based Medicine Review. Arch Otolaryngol Head Neck Surg. 2006 Feb;132(2):206-13.  5. Sindhu PJ et al. Upper-Airway Stimulation for Obstructive Sleep Apnea.  N Engl J Med. 2014 Jan 9;370(2):139-49.  6. Otto Y et al. Increased Incidence of Cardiovascular Disease in Middle-aged Men with Obstructive Sleep Apnea. Am J Respir Crit Care Med; 2002 166: 159-165  7. Dhruv EM et al. Studying Life Effects and Effectiveness of Palatopharyngoplasty (SLEEP) study: Subjective Outcomes of Isolated Uvulopalatopharyngoplasty. Otolaryngol Head Neck Surg. 2011; 144: 623-631.              Your BMI is Body mass index is 26.18 kg/m .  Weight management is a personal decision.  If you are interested in exploring weight loss strategies, the following discussion covers the approaches that may be successful. Body mass index (BMI) is one way to tell whether you are at a healthy weight, overweight, or obese. It measures your weight in relation to your height.  A BMI of  18.5 to 24.9 is in the healthy range. A person with a BMI of 25 to 29.9 is considered overweight, and someone with a BMI of 30 or greater is considered obese. More than two-thirds of American adults are considered overweight or obese.  Being overweight or obese increases the risk for further weight gain. Excess weight may lead to heart disease and diabetes.  Creating and following plans for healthy eating and physical activity may help you improve your health.  Weight control is part of healthy lifestyle and includes exercise, emotional health, and healthy eating habits. Careful eating habits lifelong are the mainstay of weight control. Though there are significant health benefits from weight loss, long-term weight loss with diet alone may be very difficult to achieve- studies show long-term success with dietary management in less than 10% of people. Attaining a healthy weight may be especially difficult to achieve in those with severe obesity. In some cases, medications, devices and surgical management might be considered.  What can you do?  If you are overweight or obese and are interested in methods for weight loss, you should discuss this with your provider.     Consider reducing daily calorie intake by 500 calories.     Keep a food journal.     Avoiding skipping meals, consider cutting portions instead.    Diet combined with exercise helps maintain muscle while optimizing fat loss. Strength training is particularly important for building and maintaining muscle mass. Exercise helps reduce stress, increase energy, and improves fitness. Increasing exercise without diet control, however, may not burn enough calories to loose weight.       Start walking three days a week 10-20 minutes at a time    Work towards walking thirty minutes five days a week     Eventually, increase the speed of your walking for 1-2 minutes at time    In addition, we recommend that you review healthy lifestyles and methods for weight loss  available through the National Institutes of Health patient information sites:  http://win.niddk.nih.gov/publications/index.htm    And look into health and wellness programs that may be available through your health insurance provider, employer, local community center, or [employer]ino club.    Weight management plan: Patient was referred to their PCP to discuss a diet and exercise plan.

## 2019-08-13 NOTE — Clinical Note
Manjinder to follow up with Primary Care provider regarding elevated blood pressure.-he continues to run high.  cpap alone may not control his htn in the setting of afib

## 2019-08-22 ENCOUNTER — ANCILLARY PROCEDURE (OUTPATIENT)
Dept: CT IMAGING | Facility: CLINIC | Age: 54
End: 2019-08-22
Attending: NURSE PRACTITIONER
Payer: COMMERCIAL

## 2019-08-22 DIAGNOSIS — R91.8 PULMONARY NODULES: ICD-10-CM

## 2019-08-23 DIAGNOSIS — G47.33 OSA (OBSTRUCTIVE SLEEP APNEA): Primary | ICD-10-CM

## 2019-08-27 NOTE — RESULT ENCOUNTER NOTE
Cuong Dunbar,    The CT scan shows that the small lung nodule present is unchanged and no further work up/follow up is necessary. Let me know if you have any questions.    Liat

## 2019-09-26 ENCOUNTER — OFFICE VISIT (OUTPATIENT)
Dept: NEUROLOGY | Facility: CLINIC | Age: 54
End: 2019-09-26
Payer: COMMERCIAL

## 2019-09-26 VITALS
TEMPERATURE: 96.9 F | DIASTOLIC BLOOD PRESSURE: 85 MMHG | WEIGHT: 193 LBS | BODY MASS INDEX: 26.14 KG/M2 | HEART RATE: 53 BPM | SYSTOLIC BLOOD PRESSURE: 136 MMHG | HEIGHT: 72 IN | OXYGEN SATURATION: 100 %

## 2019-09-26 DIAGNOSIS — M54.2 CERVICALGIA: ICD-10-CM

## 2019-09-26 DIAGNOSIS — R20.0 NUMBNESS AND TINGLING OF LEFT UPPER EXTREMITY: ICD-10-CM

## 2019-09-26 DIAGNOSIS — R20.2 PARESTHESIA OF HAND, BILATERAL: Primary | ICD-10-CM

## 2019-09-26 DIAGNOSIS — H53.121 VISUAL LOSS, TRANSIENT, RIGHT: ICD-10-CM

## 2019-09-26 DIAGNOSIS — R20.2 NUMBNESS AND TINGLING OF LEFT UPPER EXTREMITY: ICD-10-CM

## 2019-09-26 PROCEDURE — 99205 OFFICE O/P NEW HI 60 MIN: CPT | Performed by: PSYCHIATRY & NEUROLOGY

## 2019-09-26 ASSESSMENT — MIFFLIN-ST. JEOR: SCORE: 1758.44

## 2019-09-26 NOTE — PROGRESS NOTES
"INITIAL NEUROLOGY CONSULTATION    DATE OF VISIT: 9/26/2019  CLINIC LOCATION: [facility]  MRN: [id number]  PATIENT NAME: Manjinder Esparza  YOB: 1965    PRIMARY CARE PROVIDER: JOHNNY Taylor CNP     REASON FOR VISIT:   Chief Complaint   Patient presents with     Numbness     in upper arms started about a year ago. vision issues      Neck Pain     also in scalp     HISTORY OF PRESENT ILLNESS:                                                    Mr. Manjinder Esparza is 54 year old right handed male patient with past medical history of paroxysmal atrial fibrillation, hypertension, and GERD, who was seen in consultation today requested by JOHNNY Taylor CNP, for paresthesia.    Per patient's report, he was in his usual state of health until approximately a year ago, when he started to experience left lateral shoulder paresthesia (\"not numbness or tingling, but feeling cold\") after he was diagnosed with atrial fibrillation (was placed on Eliquis for 6 weeks, but later it was stopped by his cardiologist).  Since that time he feels that this symptom improved (less frequent and intense), but still continues 3-4 times per week lasting 1 to 2 minutes.  It could be triggered by increased stress level.     Also has tightness/soreness under his left collarbone and left shoulder blade that is present since his cardioversion. Symptoms are more prominent in the morning.  They are worse with physical activity and stress.  Rubbing the area and working out make them better.    In addition, the patient reports bilateral numbness and tingling of both hands that mainly occur at night but could also be present during the day when he types a lot.  The sensation is similar to when \"arm falls asleep\" and resolves shortly after he shakes his hands or changes position.    The patient also experiences brief episodes of shooting pain in his scalp (left greater than right), and continued " moderate neck pain that radiates down his arms on and off.  He has chest pain that correlates with stress or elevated anxiety.  He also experienced a couple episodes late last year when he felt shooting nerve pain throughout his body.  It did not recur.    Finally, approximately 6 months ago he had transient episode of peripheral vision loss in the right eye that lasted for approximately 3 to 5 minutes.  At that time he covered each eye, and only right eye seemed to be affected.  No recurrence since that time. He denies additional focal neurological symptoms.  He is not on oral anticoagulants or aspirin.    Most recent laboratory evaluation from July 2019 includes CMP notable for slightly elevated chloride of 112 and glucose of 102, normal LDL (80), PSA, TSH (2.22), and vitamin D level (37).    Head CT from 09/1/2018, performed after a fall, was negative for acute intracranial pathology.    No additional useful information is available in Care Everywhere, which is reviewed.    Review of Systems - the patient endorses insomnia, fatigue, hay fever, muscle tenderness, chest pain, irregular heartbeat, anxiety, depression, panic attacks, restlessness, headaches, tinnitus, black stools, chronic diarrhea, heartburn, irritable bowel syndrome, and shortness of breath.  All of them have been previously discussed with other medical providers. Otherwise, he denies any other complaints on 14-point comprehensive review of systems.  PAST MEDICAL/SURGICAL HISTORY:                                                    I personally reviewed patient's past medical and surgical history with the patient at today's visit.  Patient Active Problem List   Diagnosis     CARDIOVASCULAR SCREENING; LDL GOAL LESS THAN 160     Benign essential hypertension     New onset a-fib (H)     Pulmonary nodule, 2mm, RML     Benign lipomatous neoplasm of skin and subcutaneous tissue of trunk     Past Medical History:   Diagnosis Date     Atrial fibrillation  (H)      Gastroesophageal reflux disease      Hypertension      Past Surgical History:   Procedure Laterality Date     ANESTHESIA CARDIOVERSION N/A 6/29/2018    Procedure: ANESTHESIA CARDIOVERSION;  Cardioversion;  Surgeon: GENERIC ANESTHESIA PROVIDER;  Location: [facility] OR     SURGICAL HISTORY OF -       animal bone graft in the left lower jaw     MEDICATIONS:                                                    I personally reviewed patient's medications and allergies with the patient at today's visit:  metoprolol succinate ER (TOPROL-XL) 50 MG 24 hr tablet, Take 1 tablet (50 mg) by mouth daily  ranitidine (ZANTAC) 150 MG tablet, Take 1 tablet (150 mg) by mouth 2 times daily (Patient not taking: Reported on 9/26/2019)    ALLERGIES:                                                      Allergies   Allergen Reactions     Lisinopril      cough     FAMILY/SOCIAL HISTORY:                                                    Family and social history was reviewed with the patient at today's visit.  No family history of neurological disorders, except stroke and dementia.  Never smoker.  10 to 14 cans of beer per week.  Denies recreational drug use.  Single, lives with girlfriend and her children.  Works full-time in finance/operations.  REVIEW OF SYSTEMS:                                                    Patient has completed a [facility] Patient Health History, including a 14-system review, which was personally reviewed, and pertinent positives are listed in HPI. He denies any additional problems on the further questioning.  EXAM:                                                    VITAL SIGNS:   BP (!) 149/90   Pulse 53   Temp 96.9  F (36.1  C) (Oral)   Ht 1.829 m (6')   Wt 87.5 kg (193 lb)   SpO2 100%   BMI 26.18 kg/m    Repeat blood pressure: /85 (BP Location: Right arm, Patient Position: Sitting, Cuff Size: Adult Regular)   Pulse 53   Temp 96.9  F (36.1  C) (Oral)   Ht 1.829 m (6')   Wt 87.5 kg (193  lb)   SpO2 100%   BMI 26.18 kg/m  .  Mini-Cog Assessment:  Mini Cog Assessment  Clock Draw Score: 2 Normal  3 Item Recall: 3 objects recalled  Mini Cog Total Score: 5    General: pt is in NAD, cooperative.  Skin: normal turgor, moist mucous membranes, no lesions/rashes noticed.  HEENT: ATNC, EOMI, PERRL, white sclera, normal conjunctiva, no nystagmus or ptosis. No carotid bruits bilaterally.  Respiratory: lung sounds clear to auscultation bilaterally, no crackles, wheezes, rhonchi. Symmetric lung excursion, no accessory respiratory muscle use.  Cardiovascular: normal S1/S2, no murmurs/rubs/gallops.   Abdomen: Not distended.  : deferred.    Neurological:  Mental: alert, follows commands, Mini Cog Total Score: 5/5 with 3/3 on memory recall, no aphasia or dysarthria. Fund of knowledge is appropriate for age.  Cranial Nerves:  CN II: visual acuity - able to accurately count fingers with each eye. Visual fields intact, fundi: discs sharp, no papilledema and normal vessels bilaterally.  CN III, IV, VI: EOM intact, pupils equal and reactive  CN V: facial sensation nl  CN VII: face symmetric, no facial droop  CN VIII: hearing normal  CN IX: palate elevation symmetric, uvula at midline  CN XI SCM normal, shoulder shrug nl  CN XII: tongue midline  Motor: Strength: 5/5 in all major groups of all extremities. Normal tone. No abnormal movements. No pronator drift b/l.  Phalen's and Tinel's tests are negative bilaterally.  Reflexes: Triceps, biceps, brachioradialis, patellar, and achilles reflexes normal and symmetric. No clonus noted. Toes are down-going b/l.   Sensory: temperature, light touch, pinprick, and vibration intact. Romberg: negative.  Coordination: FNF and heel-shin tests intact b/l.   Gait:  Normal, able to tandem, toe, and heel walk.  No tenderness to palpation over the occipital nerves bilaterally.  DATA:   LABS/IMAGING/OTHER STUDIES: I reviewed pertinent medical records, including personal review of head CT  images and Care Everywhere, as detailed in the history of present illness.  ASSESSMENT AND PLAN:      ASSESSMENT: Manjinder Esparza is a 54 year old male patient with past medical history of atrial fibrillation, hypertension, and GERD, who presents with the constellation of symptoms, including limb paresthesias, pain at several locations, and transient loss of peripheral vision 6 months ago.    We had a prolonged discussion with the patient regarding his presenting complaints.  His neurological exam today non-focal, including the areas of concern.    I reviewed with the patient that his bilateral palmar paresthesias might be related to carpal tunnel syndrome versus cervical radiculopathy.  The paresthesia of the left lateral shoulder might be due to axillary neuropathy versus less likely brachial plexopathy (given the discomfort on the left collarbone) or cervical radiculopathy (given the continued cervicalgia with intermittent paresthesias to both upper extremity).  EMG will be helpful for further diagnostic clarification.    His tightness and discomfort under the left shoulder blade is likely musculoskeletal.    Shooting pains radiating up his scalp might be related to bilateral occipital neuralgias versus cervicogenic headaches given continued neck discomfort/pain.  I would like to obtain cervical spine MRI to evaluate for cervical radiculopathy that could account for his symptoms.    Finally, his transient episode of peripheral vision loss in the right eye that lasted for 3 to 5 minutes might be due to TIA versus transient ischemia of the retinal arteries of the right eye given his prior history of atrial fibrillation.  I advised the patient to start taking aspirin 81 mg daily to prevent future strokes and urged him to discuss with his cardiologist the need for prolonged heart monitoring (up to 1 year) and the need to restart anticoagulation based on results.  His LDL is within normal range (80).    Manjinder kurt  follow up with Primary Care provider regarding elevated blood pressure.     DIAGNOSES:    ICD-10-CM    1. Paresthesia of hand, bilateral R20.2 EMG   2. Numbness and tingling of left upper extremity R20.0 EMG    R20.2    3. Visual loss, transient, right H53.121 MR Brain w/o Contrast   4. Cervicalgia M54.2 MR Cervical Spine w/o Contrast     PLAN: At today's visit we thoroughly discussed various diagnostic possibilities for patient's symptoms, necessary evaluation, and the plan, which includes:  Orders Placed This Encounter   Procedures     MR Brain w/o Contrast     MR Cervical Spine w/o Contrast     EMG     We discussed that his transient peripheral vision loss symptoms might be related to atrial fibrillation, and he should start taking 81 mg of aspirin daily to reduce the risk of stroke.  It would be also recommended to discuss longer cardiac monitoring (up to 1 year) to evaluate for possibility of rare paroxysmal atrial fibrillation.    Symptoms and signs of stroke were discussed.  The patient clearly understands to call 911 with any SUDDEN onset of weakness, vision loss, speech problems, numbness, or severe headache of unknown cause.  More information was provided for home review.  Additional recommendations after the work-up.    Next follow-up appointment is in the next 4-6 weeks or earlier if needed.    Total Time:  85 minutes with > 50% spent counseling the patient on stated above assessment and recommendations, including nature of the diagnosis, needed w/u, and proposed plan.  Additional time was used to answer questions regarding patient's symptoms, my recommendations, and the plan.    Jose Romero MD  / Neurology  [place]  (Chart documentation was completed in part with Dragon voice-recognition software. Even though reviewed, some grammatical, spelling, and word errors may remain.)

## 2019-09-26 NOTE — LETTER
"    9/26/2019         RE: Manjinder Esparza  [id number]  [place] [address]        Dear Colleague,    Thank you for referring your patient, Manjinder Esparza, to the [facility]. Please see a copy of my visit note below.    INITIAL NEUROLOGY CONSULTATION    DATE OF VISIT: 9/26/2019  CLINIC LOCATION: [facility]  MRN: [id number]  PATIENT NAME: Manjinder Esparza  YOB: 1965    PRIMARY CARE PROVIDER: JOHNNY Taylor CNP     REASON FOR VISIT:   Chief Complaint   Patient presents with     Numbness     in upper arms started about a year ago. vision issues      Neck Pain     also in scalp     HISTORY OF PRESENT ILLNESS:                                                    Mr. Manjinder Esparza is 54 year old right handed male patient with past medical history of paroxysmal atrial fibrillation, hypertension, and GERD, who was seen in consultation today requested by JOHNNY Taylor CNP, for paresthesia.    Per patient's report, he was in his usual state of health until approximately a year ago, when he started to experience left lateral shoulder paresthesia (\"not numbness or tingling, but feeling cold\") after he was diagnosed with atrial fibrillation (was placed on Eliquis for 6 weeks, but later it was stopped by his cardiologist).  Since that time he feels that this symptom improved (less frequent and intense), but still continues 3-4 times per week lasting 1 to 2 minutes.  It could be triggered by increased stress level.     Also has tightness/soreness under his left collarbone and left shoulder blade that is present since his cardioversion. Symptoms are more prominent in the morning.  They are worse with physical activity and stress.  Rubbing the area and working out make them better.    In addition, the patient reports bilateral numbness and tingling of both hands that mainly occur at night but could also be present during the day when he types " "a lot.  The sensation is similar to when \"arm falls asleep\" and resolves shortly after he shakes his hands or changes position.    The patient also experiences brief episodes of shooting pain in his scalp (left greater than right), and continued moderate neck pain that radiates down his arms on and off.  He has chest pain that correlates with stress or elevated anxiety.  He also experienced a couple episodes late last year when he felt shooting nerve pain throughout his body.  It did not recur.    Finally, approximately 6 months ago he had transient episode of peripheral vision loss in the right eye that lasted for approximately 3 to 5 minutes.  At that time he covered each eye, and only right eye seemed to be affected.  No recurrence since that time. He denies additional focal neurological symptoms.  He is not on oral anticoagulants or aspirin.    Most recent laboratory evaluation from July 2019 includes CMP notable for slightly elevated chloride of 112 and glucose of 102, normal LDL (80), PSA, TSH (2.22), and vitamin D level (37).    Head CT from 09/1/2018, performed after a fall, was negative for acute intracranial pathology.    No additional useful information is available in Care Everywhere, which is reviewed.    Review of Systems - the patient endorses insomnia, fatigue, hay fever, muscle tenderness, chest pain, irregular heartbeat, anxiety, depression, panic attacks, restlessness, headaches, tinnitus, black stools, chronic diarrhea, heartburn, irritable bowel syndrome, and shortness of breath.  All of them have been previously discussed with other medical providers. Otherwise, he denies any other complaints on 14-point comprehensive review of systems.  PAST MEDICAL/SURGICAL HISTORY:                                                    I personally reviewed patient's past medical and surgical history with the patient at today's visit.  Patient Active Problem List   Diagnosis     CARDIOVASCULAR SCREENING; LDL " GOAL LESS THAN 160     Benign essential hypertension     New onset a-fib (H)     Pulmonary nodule, 2mm, RML     Benign lipomatous neoplasm of skin and subcutaneous tissue of trunk     Past Medical History:   Diagnosis Date     Atrial fibrillation (H)      Gastroesophageal reflux disease      Hypertension      Past Surgical History:   Procedure Laterality Date     ANESTHESIA CARDIOVERSION N/A 6/29/2018    Procedure: ANESTHESIA CARDIOVERSION;  Cardioversion;  Surgeon: GENERIC ANESTHESIA PROVIDER;  Location: [facility] OR     SURGICAL HISTORY OF -       animal bone graft in the left lower jaw     MEDICATIONS:                                                    I personally reviewed patient's medications and allergies with the patient at today's visit:  metoprolol succinate ER (TOPROL-XL) 50 MG 24 hr tablet, Take 1 tablet (50 mg) by mouth daily  ranitidine (ZANTAC) 150 MG tablet, Take 1 tablet (150 mg) by mouth 2 times daily (Patient not taking: Reported on 9/26/2019)    ALLERGIES:                                                      Allergies   Allergen Reactions     Lisinopril      cough     FAMILY/SOCIAL HISTORY:                                                    Family and social history was reviewed with the patient at today's visit.  No family history of neurological disorders, except stroke and dementia.  Never smoker.  10 to 14 cans of beer per week.  Denies recreational drug use.  Single, lives with girlfriend and her children.  Works full-time in finance/operations.  REVIEW OF SYSTEMS:                                                    Patient has completed a [facility] Patient Health History, including a 14-system review, which was personally reviewed, and pertinent positives are listed in HPI. He denies any additional problems on the further questioning.  EXAM:                                                    VITAL SIGNS:   BP (!) 149/90   Pulse 53   Temp 96.9  F (36.1  C) (Oral)   Ht 1.829 m (6')    Wt 87.5 kg (193 lb)   SpO2 100%   BMI 26.18 kg/m     Repeat blood pressure: /85 (BP Location: Right arm, Patient Position: Sitting, Cuff Size: Adult Regular)   Pulse 53   Temp 96.9  F (36.1  C) (Oral)   Ht 1.829 m (6')   Wt 87.5 kg (193 lb)   SpO2 100%   BMI 26.18 kg/m   .  Mini-Cog Assessment:  Mini Cog Assessment  Clock Draw Score: 2 Normal  3 Item Recall: 3 objects recalled  Mini Cog Total Score: 5    General: pt is in NAD, cooperative.  Skin: normal turgor, moist mucous membranes, no lesions/rashes noticed.  HEENT: ATNC, EOMI, PERRL, white sclera, normal conjunctiva, no nystagmus or ptosis. No carotid bruits bilaterally.  Respiratory: lung sounds clear to auscultation bilaterally, no crackles, wheezes, rhonchi. Symmetric lung excursion, no accessory respiratory muscle use.  Cardiovascular: normal S1/S2, no murmurs/rubs/gallops.   Abdomen: Not distended.  : deferred.    Neurological:  Mental: alert, follows commands, Mini Cog Total Score: 5/5 with 3/3 on memory recall, no aphasia or dysarthria. Fund of knowledge is appropriate for age.  Cranial Nerves:  CN II: visual acuity - able to accurately count fingers with each eye. Visual fields intact, fundi: discs sharp, no papilledema and normal vessels bilaterally.  CN III, IV, VI: EOM intact, pupils equal and reactive  CN V: facial sensation nl  CN VII: face symmetric, no facial droop  CN VIII: hearing normal  CN IX: palate elevation symmetric, uvula at midline  CN XI SCM normal, shoulder shrug nl  CN XII: tongue midline  Motor: Strength: 5/5 in all major groups of all extremities. Normal tone. No abnormal movements. No pronator drift b/l.  Phalen's and Tinel's tests are negative bilaterally.  Reflexes: Triceps, biceps, brachioradialis, patellar, and achilles reflexes normal and symmetric. No clonus noted. Toes are down-going b/l.   Sensory: temperature, light touch, pinprick, and vibration intact. Romberg: negative.  Coordination: FNF and heel-shin  tests intact b/l.   Gait:  Normal, able to tandem, toe, and heel walk.  No tenderness to palpation over the occipital nerves bilaterally.  DATA:   LABS/IMAGING/OTHER STUDIES: I reviewed pertinent medical records, including personal review of head CT images and Care Everywhere, as detailed in the history of present illness.  ASSESSMENT AND PLAN:      ASSESSMENT: Manjinder Esparza is a 54 year old male patient with past medical history of atrial fibrillation, hypertension, and GERD, who presents with the constellation of symptoms, including limb paresthesias, pain at several locations, and transient loss of peripheral vision 6 months ago.    We had a prolonged discussion with the patient regarding his presenting complaints.  His neurological exam today non-focal, including the areas of concern.    I reviewed with the patient that his bilateral palmar paresthesias might be related to carpal tunnel syndrome versus cervical radiculopathy.  The paresthesia of the left lateral shoulder might be due to axillary neuropathy versus less likely brachial plexopathy (given the discomfort on the left collarbone) or cervical radiculopathy (given the continued cervicalgia with intermittent paresthesias to both upper extremity).  EMG will be helpful for further diagnostic clarification.    His tightness and discomfort under the left shoulder blade is likely musculoskeletal.    Shooting pains radiating up his scalp might be related to bilateral occipital neuralgias versus cervicogenic headaches given continued neck discomfort/pain.  I would like to obtain cervical spine MRI to evaluate for cervical radiculopathy that could account for his symptoms.    Finally, his transient episode of peripheral vision loss in the right eye that lasted for 3 to 5 minutes might be due to TIA versus transient ischemia of the retinal arteries of the right eye given his prior history of atrial fibrillation.  I advised the patient to start taking aspirin 81  mg daily to prevent future strokes and urged him to discuss with his cardiologist the need for prolonged heart monitoring (up to 1 year) and the need to restart anticoagulation based on results.  His LDL is within normal range (80).    Manjinder to follow up with Primary Care provider regarding elevated blood pressure.     DIAGNOSES:    ICD-10-CM    1. Paresthesia of hand, bilateral R20.2 EMG   2. Numbness and tingling of left upper extremity R20.0 EMG    R20.2    3. Visual loss, transient, right H53.121 MR Brain w/o Contrast   4. Cervicalgia M54.2 MR Cervical Spine w/o Contrast     PLAN: At today's visit we thoroughly discussed various diagnostic possibilities for patient's symptoms, necessary evaluation, and the plan, which includes:  Orders Placed This Encounter   Procedures     MR Brain w/o Contrast     MR Cervical Spine w/o Contrast     EMG     We discussed that his transient peripheral vision loss symptoms might be related to atrial fibrillation, and he should start taking 81 mg of aspirin daily to reduce the risk of stroke.  It would be also recommended to discuss longer cardiac monitoring (up to 1 year) to evaluate for possibility of rare paroxysmal atrial fibrillation.    Symptoms and signs of stroke were discussed.  The patient clearly understands to call 911 with any SUDDEN onset of weakness, vision loss, speech problems, numbness, or severe headache of unknown cause.  More information was provided for home review.  Additional recommendations after the work-up.    Next follow-up appointment is in the next 4-6 weeks or earlier if needed.    Total Time:  85 minutes with > 50% spent counseling the patient on stated above assessment and recommendations, including nature of the diagnosis, needed w/u, and proposed plan.  Additional time was used to answer questions regarding patient's symptoms, my recommendations, and the plan.    Jose Romero MD  / Neurology  [place]  (Chart documentation was  completed in part with Dragon voice-recognition software. Even though reviewed, some grammatical, spelling, and word errors may remain.)            Again, thank you for allowing me to participate in the care of your patient.        Sincerely,        Jose Romero MD

## 2019-09-26 NOTE — PATIENT INSTRUCTIONS
AFTER VISIT SUMMARY (AVS):    At today's visit we thoroughly discussed various diagnostic possibilities for your symptoms, necessary evaluation, and the plan, which includes:  Orders Placed This Encounter   Procedures     MR Brain w/o Contrast     MR Cervical Spine w/o Contrast     EMG     We discussed that your transient peripheral vision loss symptoms might be related to atrial fibrillation and you should start taking 81 mg of aspirin daily to reduce risk of stroke.  It would be also recommended to discuss longer cardiac monitoring (up to 1 year) to evaluate for possibility of rare paroxysmal atrial fibrillation.    Symptoms and signs of stroke were discussed.  You should call 911 with any SUDDEN onset of weakness, vision loss, speech problems, numbness, or severe headache of unknown cause.  More information was provided for home review.  Additional recommendations after the work-up.    Next follow-up appointment is in the next 4-6 weeks or earlier if needed.    Please do not hesitate to call me with any questions or concerns.    Thanks.    Patient Education     Symptoms of a Stroke    During a stroke, blood stops flowing to part of the brain. This can damage areas in the brain that control the rest of the body. Call 911 and get help right away if any of these symptoms come on suddenly, even if the symptoms don t last.  Know the symptoms of a stroke    Weakness. You may feel a sudden weakness, tingling, or a loss of feeling on one side of your face or body including your arm or leg.     Vision problems. You may have sudden double vision or trouble seeing in one or both eyes.    Speech problems. You may have sudden trouble talking, slurred speech, or problems understanding others.    Headache. You may have a sudden, severe headache.    Movement problems. You may have sudden trouble walking, dizziness, a feeling of spinning, a loss of balance, a feeling of falling, or blackouts.    Seizure. You may also have a  seizure with a large or hemorrhagic stroke.   Remember: If you have any of these symptoms, call 911 and your doctor as soon as possible.  F.A.S.T. is an easy way to remember the signs of a stroke. When you see these signs, you will know that you need to call 911 fast.   F.A.S.T. stands for:    F is for face drooping. One side of the face is drooping or numb. When the person smiles, the smile is uneven.    A is for arm weakness. One arm is weak or numb. When the person lifts both arms at the same time, one arm may drift downward.    S is for speech difficulty. You may notice slurred speech or difficulty speaking. The person can't repeat a simple sentence correctly when asked.    T is for time to dial 911. If someone shows any of these symptoms, even if they go away, call 911 right away. Make note of the time the symptoms first appeared.  Date Last Reviewed: 2/1/2018 2000-2018 [employer]. [address] [place] [address]. All rights reserved. This information is not intended as a substitute for professional medical care. Always follow your healthcare professional's instructions.

## 2019-11-03 ENCOUNTER — HEALTH MAINTENANCE LETTER (OUTPATIENT)
Age: 54
End: 2019-11-03

## 2020-02-02 DIAGNOSIS — I10 HYPERTENSION, UNSPECIFIED TYPE: ICD-10-CM

## 2020-02-02 DIAGNOSIS — I47.10 PAROXYSMAL SUPRAVENTRICULAR TACHYCARDIA (H): ICD-10-CM

## 2020-02-04 RX ORDER — METOPROLOL SUCCINATE 50 MG/1
50 TABLET, EXTENDED RELEASE ORAL DAILY
Qty: 90 TABLET | Refills: 0 | Status: SHIPPED | OUTPATIENT
Start: 2020-02-04 | End: 2020-05-08

## 2020-02-04 NOTE — TELEPHONE ENCOUNTER
METOPROLOL ER SUCCINATE 50MG TABS  Last Written Prescription Date:  1/30/2019  Last Fill Quantity: 90,   # refills: 3  Last Office Visit : 9/6/2018  Future Office visit:  None    Routing refill request to provider for review/approval because:  Office Visit & Labs Due    90 Day Alycia sent to pharm  Clinic Coordinator Notified    Pretty Malloy RN  Central Triage Red Flags/Med Refills

## 2020-03-19 NOTE — PATIENT INSTRUCTIONS
"CT Chest:  [phone] to schedule.    Patient Education     Paraesthesias  Paraesthesia is a burning or prickling sensation that is sometimes felt in the hands, arms, legs or feet. It can also occur in other parts of the body. It can also feel like tingling or numbness, skin crawling, or itching. The feeling is not comfortable, but it is not painful. (The \"pins and needles\" feeling that happens when a foot or hand \"falls asleep\" is a temporary paraesthesia.)  Paraesthesias that last or come and go may be caused by medical issues that need to be treated. These include stroke, a bulging disk pressing on a nerve, a trapped nerve, vitamin deficiencies, uncontrolled diabetes, alcohol abuse, or even certain medicines.  Tests are often done. These tests may include blood tests, X-ray, CT (computerized tomography) scan, nerve conduction studies (NCS), or a muscle test (electromyography). Depending on the cause, treatment may include physical therapy.  Home care    Tell your healthcare provider about all medicines you take. This includes prescription and over-the-counter medicines, vitamins, and herbs. Ask if any of the medicines may be causing your problems. Don't make any changes to prescription medicines without talking to your healthcare provider first.    You may be prescribed medicines to help relieve the tingling feeling or for pain. Take all medicines as directed.    A numb hand or foot may be more prone to injury. To help protect it:  ? Always use oven mitts.  ? Test water with an unaffected hand or foot.  ? Use caution when trimming nails. File sharp areas.  ? Wear shoes that fit well to avoid pressure points, blisters, and ulcers.  ? Inspect your hands and feet carefully (including the soles of your feet and between your toes) daily. If you see red areas, sores, or other problems, tell your healthcare provider.  Follow-up care  Follow up with your doctor, or as advised. You may need further testing or " evaluation.  When to seek medical advice  Call your healthcare provider right away if any of the following occur:    Numbness or weakness of the face, one arm, or one leg    Slurred speech, confusion, trouble speaking, walking, or seeing    Severe headache, fainting spell, dizziness, or seizure    Chest, arm, neck, or upper back pain    Loss of bladder or bowel control    Open wound with redness, swelling, or pus  Date Last Reviewed: 4/1/2018 2000-2018 [employer]. [address] [place], [place] [id number]. All rights reserved. This information is not intended as a substitute for professional medical care. Always follow your healthcare professional's instructions.             Preventive Health Recommendations  Male Ages 50 - 64    Yearly exam:             See your health care provider every year in order to  o   Review health changes.   o   Discuss preventive care.    o   Review your medicines if your doctor has prescribed any.     Have a cholesterol test every 5 years, or more frequently if you are at risk for high cholesterol/heart disease.     Have a diabetes test (fasting glucose) every three years. If you are at risk for diabetes, you should have this test more often.     Have a colonoscopy at age 50, or have a yearly FIT test (stool test). These exams will check for colon cancer.      Talk with your health care provider about whether or not a prostate cancer screening test (PSA) is right for you.    You should be tested each year for STDs (sexually transmitted diseases), if you re at risk.     Shots: Get a flu shot each year. Get a tetanus shot every 10 years.     Nutrition:    Eat at least 5 servings of fruits and vegetables daily.     Eat whole-grain bread, whole-wheat pasta and brown rice instead of white grains and rice.     Get adequate Calcium and Vitamin D.     Lifestyle    Exercise for at least 150 minutes a week (30 minutes a day, 5 days a week). This will help you control your weight  and prevent disease.     Limit alcohol to one drink per day.     No smoking.     Wear sunscreen to prevent skin cancer.     See your dentist every six months for an exam and cleaning.     See your eye doctor every 1 to 2 years.     WILMA [place]

## 2020-05-05 DIAGNOSIS — I10 HYPERTENSION, UNSPECIFIED TYPE: ICD-10-CM

## 2020-05-05 DIAGNOSIS — I47.10 PAROXYSMAL SUPRAVENTRICULAR TACHYCARDIA (H): ICD-10-CM

## 2020-05-07 ENCOUNTER — TELEPHONE (OUTPATIENT)
Dept: CARDIOLOGY | Facility: CLINIC | Age: 55
End: 2020-05-07

## 2020-05-07 NOTE — TELEPHONE ENCOUNTER
BREEM to convert appointment into video/telelphone visit with Dr. Hendrickson on 5/14. Left call back number.

## 2020-05-08 RX ORDER — METOPROLOL SUCCINATE 50 MG/1
TABLET, EXTENDED RELEASE ORAL
Qty: 30 TABLET | Refills: 0 | Status: SHIPPED | OUTPATIENT
Start: 2020-05-08 | End: 2020-05-14

## 2020-05-14 ENCOUNTER — VIRTUAL VISIT (OUTPATIENT)
Dept: CARDIOLOGY | Facility: CLINIC | Age: 55
End: 2020-05-14
Attending: INTERNAL MEDICINE
Payer: COMMERCIAL

## 2020-05-14 DIAGNOSIS — R20.2 PARESTHESIAS: ICD-10-CM

## 2020-05-14 DIAGNOSIS — I10 HYPERTENSION, UNSPECIFIED TYPE: ICD-10-CM

## 2020-05-14 DIAGNOSIS — R00.2 PALPITATIONS: ICD-10-CM

## 2020-05-14 DIAGNOSIS — I48.0 PAROXYSMAL ATRIAL FIBRILLATION (H): Primary | ICD-10-CM

## 2020-05-14 DIAGNOSIS — I47.10 PAROXYSMAL SUPRAVENTRICULAR TACHYCARDIA (H): ICD-10-CM

## 2020-05-14 PROCEDURE — 99214 OFFICE O/P EST MOD 30 MIN: CPT | Mod: 95 | Performed by: INTERNAL MEDICINE

## 2020-05-14 RX ORDER — METOPROLOL SUCCINATE 50 MG/1
TABLET, EXTENDED RELEASE ORAL
Qty: 90 TABLET | Refills: 3 | Status: SHIPPED | OUTPATIENT
Start: 2020-05-14 | End: 2021-05-20

## 2020-05-14 ASSESSMENT — PAIN SCALES - GENERAL: PAINLEVEL: NO PAIN (0)

## 2020-05-14 NOTE — PATIENT INSTRUCTIONS
You were seen in the Electrophysiology Clinic today by: Dr. Tr Hendrickson MD    Plan:     Medication Changes:     None. I have sent a refill for your Metoprolol to your pharmacy.    Labs/Tests Needed:    Recommend following up with Neurology or your primary care doctor regarding the blurry vision.    Follow up visit:    6 months w/ Dr. Hendrickson.    Further Instructions:    Let us know should you have further atrial fibrillation episodes. We may need to consider starting a blood thinner if they re-occur or become more frequent.      Your Care Team:  [facility]   Telephone Number     Irena Sheriff RN [phone]     For scheduling appts or procedures:    Gabby Bishop   [phone]   For the Device Clinic (Pacemakers, ICDs, Loop Recorders)    During business hours: [phone]  After business hours:   [phone]- select option 4 and ask for job code [phone].       Cardiovascular Clinic:   [address] [place] [address]. [place] [address]      As always, Thank you for trusting us with your health care needs!

## 2020-05-14 NOTE — PROGRESS NOTES
"Manjinder Esparza is a 54 year old male who is being evaluated via a billable video visit.      The patient has been notified of following:     \"This video visit will be conducted via a call between you and your physician/provider. We have found that certain health care needs can be provided without the need for an in-person physical exam.  This service lets us provide the care you need with a video conversation.  If a prescription is necessary we can send it directly to your pharmacy.  If lab work is needed we can place an order for that and you can then stop by our lab to have the test done at a later time.    Video visits are billed at different rates depending on your insurance coverage.  Please reach out to your insurance provider with any questions.    If during the course of the call the physician/provider feels a video visit is not appropriate, you will not be charged for this service.\"    Patient has given verbal consent for Video visit? Yes    How would you like to obtain your AVS? Isaelharderick    Patient would like the video invitation sent by: Other e-mail: [email or web address]    Will anyone else be joining your video visit? No    HPI:       54 year old male with a history of hypertension and recent admission for afib presents for f/u.  He was last seen in this clinic in September 2018.      His original A. fib resulted in his undergoing a FRANCISCO JAVIER/Cardioversion which resulted in conversion to NSR.  Lab work unremarkable, trop negative x1. Echo EF 55-60%, otherwise I just need to work with normal. [id number] - 1 (HTN). He was started on 5 mg PO Apixaban BID and Metoprolol 50 mg PO BID. His Ziopatch showed no sustained afib -  3 runs of NSVT that did not correlate to patient's symptoms of palpitations.     Since his last follow-up Manjinder has been seen by neurology later in September 2018 for several issues related to paresthesias and also to transient visual disturbance.  The neurology consultant advised him to " be on aspirin considering the possibility of a TIA or vascular embolic event.  The role of continued anticoagulation was raised as well.    Since his visit in this clinic and with neurology he has not had any recurrence of sustained atrial fibrillation but does have some sense of irregular heartbeats which are brief and terminate quickly.  He is no longer taking apixaban.  He does continue his metoprolol 50 mg long-acting once per day.    Manjinder never did start using the daily aspirin recommended by neurology.  He has not had any recurrence of the localized visual field defect.  He does however note several neurologic symptoms which may merit some further evaluation.  First she indicates that he continues to have a numbness in his hands and feet particularly occurring at night.  The neurology evaluation had been for paresthesias and one wonders whether he may be experiencing some neuropathy of unknown determine etiology.  He also notes some visual blurring when he wakes up from sleep and it seems to take some time to clear.    Patient does have a remote history in his family of diabetes in his mother at older age.  He is not known himself to be diabetic but this is not been formally evaluated as best I can tell.  I recommended that he see his family physician and perhaps have a glucose tolerance test.  He may need further reevaluation as well for his peripheral neuropathic symptoms.  I rather doubt that these are vascular in origin.  Recommend voiced understanding of these recommendations.    In terms of his ongoing medications I agree with his continuing metoprolol 50 mg long-acting once per day.  We will call his pharmacy as he is down to his last pill.    Patient had no new cardiovascular complaints apart from those described above.  He will contact us if there are any status changes otherwise I would plan to see him in about 6 months time.        PAST MEDICAL HISTORY:  Past Medical History:   Diagnosis Date      Atrial fibrillation (H)      Gastroesophageal reflux disease      Hypertension        CURRENT MEDICATIONS:  Current Outpatient Medications   Medication Sig Dispense Refill     metoprolol succinate ER (TOPROL-XL) 50 MG 24 hr tablet TAKE 1 TABLET BY MOUTH DAILY. DO NOT CRUSH. MAY SPLIT IN HALF ALONG SCORE LINE 30 tablet 0     ranitidine (ZANTAC) 150 MG tablet Take 1 tablet (150 mg) by mouth 2 times daily (Patient not taking: Reported on 9/26/2019)         PAST SURGICAL HISTORY:  Past Surgical History:   Procedure Laterality Date     ANESTHESIA CARDIOVERSION N/A 6/29/2018    Procedure: ANESTHESIA CARDIOVERSION;  Cardioversion;  Surgeon: GENERIC ANESTHESIA PROVIDER;  Location: [facility] OR     SURGICAL HISTORY OF -       animal bone graft in the left lower jaw       ALLERGIES:     Allergies   Allergen Reactions     Lisinopril      cough       FAMILY HISTORY:  Family History   Problem Relation Age of Onset     Diabetes Mother         type 2     Hypertension Mother      Cancer - colorectal Mother      Circulatory Mother         varicose veins     Eye Disorder Mother         cataracts     Hypertension Father      Prostate Cancer Father      Arthritis Father         in his knees     Lipids Father      Eye Disorder Maternal Grandmother         cataracts     Cancer Maternal Grandmother      Cancer Maternal Grandfather         lung     Cerebrovascular Disease Paternal Grandmother      Allergies Other         self, developing allergies      Gastrointestinal Disease Other         self, may have ibs     Lipids Brother      Prostate Cancer Brother          SOCIAL HISTORY:  Social History     Tobacco Use     Smoking status: Never Smoker     Smokeless tobacco: Never Used   Substance Use Topics     Alcohol use: Yes     Alcohol/week: 6.0 - 9.0 standard drinks     Types: 6 - 9 Cans of beer per week     Comment: 10-14 drinks a week      Drug use: No       ROS:   Constitutional: No fever, chills, or sweats. Weight stable.   ENT: No visual  disturbance such as he had experienced previously but he does have the blurring noted in HPI.  No ear ache, epistaxis, sore throat.   Cardiovascular: As per HPI.   Respiratory: No cough, hemoptysis.    GI: No nausea, vomiting, hematemesis, melena.   : No hematuria.   Integument: Negative.   Psychiatric: Negative.   Hematologic:  Easy bruising, no easy bleeding.  Neuro: Negative apart from the symptoms noted in HPI.   Endocrinology: No significant heat or cold intolerance   Musculoskeletal: No myalgia.    Exam:  There were no vitals taken for this visit.  GENERAL APPEARANCE: healthy, alert and no distress    RESPIRATORY:no rales, rhonchi or wheezes, no use of accessory muscles, no retractions, respirations are unlabored, normal respiratory rate    NEURO: alert and oriented to person/place/time, normal speech, gait and affect    PSYCH: Normal thought patterns and no abnormality detected    Labs:  CBC RESULTS:   Lab Results   Component Value Date    WBC 6.9 10/01/2018    RBC 4.54 10/01/2018    HGB 14.2 10/01/2018    HCT 40.7 10/01/2018    MCV 90 10/01/2018    MCH 31.3 10/01/2018    MCHC 34.9 10/01/2018    RDW 12.7 10/01/2018     10/01/2018       BMP RESULTS:  Lab Results   Component Value Date     07/18/2019    POTASSIUM 3.9 07/18/2019    CHLORIDE 112 (H) 07/18/2019    CO2 23 07/18/2019    ANIONGAP 7 07/18/2019     (H) 07/18/2019    BUN 17 07/18/2019    CR 1.09 07/18/2019    GFRESTIMATED 77 07/18/2019    GFRESTBLACK 89 07/18/2019    JESUS 8.5 07/18/2019        INR RESULTS:  Lab Results   Component Value Date    INR 1.18 (H) 07/16/2018    INR 1.16 (H) 06/29/2018       Procedures:  PULMONARY FUNCTION TESTS:   No flowsheet data found.      ECHOCARDIOGRAM:   No new findings available      Assessment and Plan:    1.  Atrial ectopy with past history of sustained atrial fibrillation but no recurrence since 2018.  Patient is low risk based on chads-vas score.  I discussed the role of aspirin and  anticoagulants.  He does not appear to be a candidate for anticoagulation on a long-term basis at this time but we will follow going forward if he has more A. fib episodes    2.  Peripheral paresthesias and visual blurring of uncertain etiology.  I have recommended that he see his family physician and perhaps undergo assessment for diabetes given his family history.  He had seen neurology previously regarding the paresthesias and this may need an update    3.  No new cardiovascular symptoms.  We will continue metoprolol with which she seems comfortable.  A renewal of his prescription will be provided.    Plan:    1.  Renew prescription for metoprolol long-acting 50 mg once a day at [facility] in [place] on [address]    2.  Clinic follow-up in 6 months time or earlier if new symptoms arise    Video ON 2:10 PM  Video OFF 2:23 PM  Patient at HOME,   Clinic [facility]  Elapsed 13 Min  Platform D[employer]imity    I very much appreciated the opportunity to see and assess Manjinder Esparza in the clinic today. Please do not hesitate to contact my office if you have any questions or concerns.      Tr Hendrickson MD  Cardiac Arrhythmia Service  [facility]  [phone]      [address]

## 2020-08-11 ENCOUNTER — MYC REFILL (OUTPATIENT)
Dept: CARDIOLOGY | Facility: CLINIC | Age: 55
End: 2020-08-11

## 2020-08-11 DIAGNOSIS — I47.10 PAROXYSMAL SUPRAVENTRICULAR TACHYCARDIA (H): ICD-10-CM

## 2020-08-11 DIAGNOSIS — I10 HYPERTENSION, UNSPECIFIED TYPE: ICD-10-CM

## 2020-08-11 RX ORDER — METOPROLOL SUCCINATE 50 MG/1
TABLET, EXTENDED RELEASE ORAL
Qty: 90 TABLET | Refills: 3 | Status: CANCELLED | OUTPATIENT
Start: 2020-08-11

## 2020-09-09 ENCOUNTER — OFFICE VISIT (OUTPATIENT)
Dept: FAMILY MEDICINE | Facility: CLINIC | Age: 55
End: 2020-09-09
Payer: COMMERCIAL

## 2020-09-09 VITALS
OXYGEN SATURATION: 99 % | TEMPERATURE: 98.4 F | WEIGHT: 194.8 LBS | BODY MASS INDEX: 26.38 KG/M2 | HEART RATE: 68 BPM | RESPIRATION RATE: 16 BRPM | DIASTOLIC BLOOD PRESSURE: 86 MMHG | SYSTOLIC BLOOD PRESSURE: 126 MMHG | HEIGHT: 72 IN

## 2020-09-09 DIAGNOSIS — I48.0 PAROXYSMAL A-FIB (H): ICD-10-CM

## 2020-09-09 DIAGNOSIS — Z12.5 SCREENING PSA (PROSTATE SPECIFIC ANTIGEN): ICD-10-CM

## 2020-09-09 DIAGNOSIS — Z13.6 SCREENING FOR CARDIOVASCULAR CONDITION: ICD-10-CM

## 2020-09-09 DIAGNOSIS — Z00.00 ROUTINE GENERAL MEDICAL EXAMINATION AT A HEALTH CARE FACILITY: Primary | ICD-10-CM

## 2020-09-09 PROCEDURE — 90471 IMMUNIZATION ADMIN: CPT | Performed by: FAMILY MEDICINE

## 2020-09-09 PROCEDURE — 90750 HZV VACC RECOMBINANT IM: CPT | Performed by: FAMILY MEDICINE

## 2020-09-09 PROCEDURE — 80053 COMPREHEN METABOLIC PANEL: CPT | Performed by: FAMILY MEDICINE

## 2020-09-09 PROCEDURE — 80061 LIPID PANEL: CPT | Performed by: FAMILY MEDICINE

## 2020-09-09 PROCEDURE — 99214 OFFICE O/P EST MOD 30 MIN: CPT | Mod: 25 | Performed by: FAMILY MEDICINE

## 2020-09-09 PROCEDURE — 99396 PREV VISIT EST AGE 40-64: CPT | Mod: 25 | Performed by: FAMILY MEDICINE

## 2020-09-09 PROCEDURE — 36415 COLL VENOUS BLD VENIPUNCTURE: CPT | Performed by: FAMILY MEDICINE

## 2020-09-09 PROCEDURE — 93000 ELECTROCARDIOGRAM COMPLETE: CPT | Performed by: FAMILY MEDICINE

## 2020-09-09 PROCEDURE — G0103 PSA SCREENING: HCPCS | Performed by: FAMILY MEDICINE

## 2020-09-09 RX ORDER — ASPIRIN 81 MG/1
81 TABLET, CHEWABLE ORAL DAILY
COMMUNITY

## 2020-09-09 ASSESSMENT — ENCOUNTER SYMPTOMS
PARESTHESIAS: 1
FREQUENCY: 0
HEARTBURN: 1
HEMATOCHEZIA: 0
CHILLS: 0
DIARRHEA: 1
HEADACHES: 1
JOINT SWELLING: 0
NAUSEA: 1
HEMATURIA: 0
MYALGIAS: 0
DIZZINESS: 0
SHORTNESS OF BREATH: 1
NERVOUS/ANXIOUS: 0
FEVER: 0
COUGH: 0
SORE THROAT: 0
ARTHRALGIAS: 0
CONSTIPATION: 0
PALPITATIONS: 1
ABDOMINAL PAIN: 0
DYSURIA: 0
WEAKNESS: 0
EYE PAIN: 0

## 2020-09-09 ASSESSMENT — MIFFLIN-ST. JEOR: SCORE: 1761.61

## 2020-09-09 NOTE — PROGRESS NOTES
SUBJECTIVE:   CC: Manjinder Esparza is an 55 year old male who presents for preventative health visit.     Healthy Habits:     Getting at least 3 servings of Calcium per day:  Yes    Bi-annual eye exam:  Yes    Dental care twice a year:  Yes    Sleep apnea or symptoms of sleep apnea:  Daytime drowsiness, Excessive snoring and Sleep apnea    Diet:  Low fat/cholesterol and Carbohydrate counting    Frequency of exercise:  4-5 days/week    Duration of exercise:  30-45 minutes    Taking medications regularly:  Yes    Medication side effects:  Other    PHQ-2 Total Score: 1    Additional concerns today:  Yes    Chest Pain      Onset: since of last year ongoing     Description (location/character/radiation/duration): left side chest pain     Intensity:  mild    Accompanying signs and symptoms:        Shortness of breath: YES- middle of the night        Sweating: no        Nausea/vomitting: YES- nausea       Palpitations: YES       Other (fevers/chills/cough/heartburn/lightheadedness): YES- heartburn and occasional light headed     History (similar episodes/previous evaluation): previous ekg done     Precipitating or alleviating factors:       Worse with exertion: no feels better        Worse with breathing: no        Related to eating: YES       Better with burping: no     Therapies tried and outcome: None      Today's PHQ-2 Score:   PHQ-2 ( 1999 Pfizer) 9/9/2020   Q1: Little interest or pleasure in doing things 1   Q2: Feeling down, depressed or hopeless 0   PHQ-2 Score 1   Q1: Little interest or pleasure in doing things Several days   Q2: Feeling down, depressed or hopeless Not at all   PHQ-2 Score 1     Abuse: Current or Past(Physical, Sexual or Emotional)- No  Do you feel safe in your environment? Yes    Social History     Tobacco Use     Smoking status: Never Smoker     Smokeless tobacco: Never Used   Substance Use Topics     Alcohol use: Yes     Alcohol/week: 6.0 - 9.0 standard drinks     Types: 6 - 9 Cans of beer per  week     Comment: 10-14 drinks a week      If you drink alcohol do you typically have >3 drinks per day or >7 drinks per week? Yes    Alcohol Use 9/9/2020   Prescreen: >3 drinks/day or >7 drinks/week? -   Prescreen: >3 drinks/day or >7 drinks/week? -   AUDIT SCORE  6     AUDIT - Alcohol Use Disorders Identification Test - Reproduced from the World Health Organization Audit 2001 (Second Edition) 9/9/2020   1.  How often do you have a drink containing alcohol? 2 to 4 times a month   2.  How many drinks containing alcohol do you have on a typical day when you are drinking? 5 or 6   3.  How often do you have five or more drinks on one occasion? Monthly   4.  How often during the last year have you found that you were not able to stop drinking once you had started? Never   5.  How often during the last year have you failed to do what was normally expected of you because of drinking? Never   6.  How often during the last year have you needed a first drink in the morning to get yourself going after a heavy drinking session? Never   7.  How often during the last year have you had a feeling of guilt or remorse after drinking? Never   8.  How often during the last year have you been unable to remember what happened the night before because of your drinking? Never   9.  Have you or someone else been injured because of your drinking? No   10. Has a relative, friend, doctor or other health care worker been concerned about your drinking or suggested you cut down? No   TOTAL SCORE 6     Last PSA:   PSA   Date Value Ref Range Status   07/18/2019 1.35 0 - 4 ug/L Final     Comment:     Assay Method:  Chemiluminescence using Siemens Vista analyzer     Reviewed orders with patient. 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    Has portable EKG machine through work and noticed abnormal ekg at home. ([employer] machine)   Talked to cardiologist virtually and they were not concerned.   Feels on left side of chest. Feels stress, muscles are playing some role. Had angiogram in the past.  Workout - feels fine and no pain. Works out 3-4 times per week.   Notices more in the morning and notices with stress. Some days feels really well and some days not so much. Previous ER work and overnight observation - was fine.   Wants to stay away from anxiety or stress drugs. Will consider therapy and will contact old therapist.     Review of Systems   Constitutional: Negative for chills and fever.   HENT: Negative for congestion, ear pain, hearing loss and sore throat.    Eyes: Positive for visual disturbance. Negative for pain.   Respiratory: Positive for shortness of breath. Negative for cough.    Cardiovascular: Positive for chest pain and palpitations. Negative for peripheral edema.   Gastrointestinal: Positive for diarrhea, heartburn and nausea. Negative for abdominal pain, constipation and hematochezia.   Genitourinary: Negative for discharge, dysuria, frequency, genital sores, hematuria, impotence and urgency.   Musculoskeletal: Negative for arthralgias, joint swelling and myalgias.   Skin: Negative for rash.   Neurological: Positive for headaches and paresthesias. Negative for dizziness and weakness.   Psychiatric/Behavioral: Negative for mood changes. The patient is not nervous/anxious.      OBJECTIVE:   /86 (BP Location: Right arm, Patient Position: Sitting, Cuff Size: Adult Regular)   Pulse 68   Temp 98.4  F (36.9  C) (Oral)   Resp 16   Ht 1.829 m (6')   Wt 88.4 kg (194 lb 12.8 oz)   SpO2 99%   BMI 26.42 kg/m      Physical Exam  GENERAL: healthy, alert and no distress  EYES: Eyes grossly normal to inspection, PERRL and conjunctivae and sclerae normal  HENT:  ear canals and TM's normal, nose and mouth without ulcers or lesions, mild left ear wax  NECK: no adenopathy, no asymmetry, masses, or scars and thyroid normal to palpation  RESP: lungs clear to auscultation - no rales, rhonchi or wheezes  CV: regular rate and rhythm, normal S1 S2, no S3 or S4, no murmur, click or rub, no peripheral edema and peripheral pulses strong  ABDOMEN: soft, nontender, no hepatosplenomegaly, no masses and bowel sounds normal   (male): normal male genitalia without lesions or urethral discharge, no hernia, left scrotal sac mild diffuse fluid collection   MS: no gross musculoskeletal defects noted, no edema  SKIN: left posterior shoulder blade lipoma  NEURO: Normal strength and tone, mentation intact and speech normal  PSYCH: mentation appears normal, affect normal/bright     ASSESSMENT/PLAN:   1. Routine general medical examination at a health care facility   got his flu shot outside. Will get shingrix today.     2. Paroxysmal A-fib (H)  With persistent chest pain.  Was recently seen by cardiologist.  Symptoms are still persistent.  He admits some anxiety and I suspect many of his symptoms could be exacerbated by psychosomatic pain.  He has had negative angiogram in 2018.  He is seeing currently cardiologist but I recommended him to follow-up with cardiologist and see if he needs any other testing.  His EKG looks stable to me except for bradycardia from metoprolol I supspect.  He has a home EKG monitor and his out side EKG also looks fine to me.   - EKG 12-lead complete w/read - Clinics  - Comprehensive metabolic panel (BMP + Alb, Alk Phos, ALT, AST, Total. Bili, TP)    3. Screening for cardiovascular condition     - Lipid panel reflex to direct LDL Fasting    4. Screening PSA (prostate specific antigen)     - PSA, screen    COUNSELING:   Reviewed preventive health counseling, as reflected in patient instructions  Special attention given to:        Regular exercise       Healthy  diet/nutrition       Vision screening       Hearing screening       Colon cancer screening       Prostate cancer screening    Estimated body mass index is 26.18 kg/m  as calculated from the following:    Height as of 9/26/19: 1.829 m (6').    Weight as of 9/26/19: 87.5 kg (193 lb).     Weight management plan: Discussed healthy diet and exercise guidelines    He reports that he has never smoked. He has never used smokeless tobacco.      Counseling Resources:  ATP IV Guidelines  Pooled Cohorts Equation Calculator  FRAX Risk Assessment  ICSI Preventive Guidelines  Dietary Guidelines for Americans, 2010  USDA's MyPlate  ASA Prophylaxis  Lung CA Screening    Lemuel Swan MD, MD  [facility]

## 2020-09-09 NOTE — NURSING NOTE
Prior to immunization administration, verified patients identity using patient s name and date of birth. Please see Immunization Activity for additional information.     Screening Questionnaire for Adult Immunization    Are you sick today?   No   Do you have allergies to medications, food, a vaccine component or latex?   No   Have you ever had a serious reaction after receiving a vaccination?   No   Do you have a long-term health problem with heart, lung, kidney, or metabolic disease (e.g., diabetes), asthma, a blood disorder, no spleen, complement component deficiency, a cochlear implant, or a spinal fluid leak?  Are you on long-term aspirin therapy?   No   Do you have cancer, leukemia, HIV/AIDS, or any other immune system problem?   No   Do you have a parent, brother, or sister with an immune system problem?   No   In the past 3 months, have you taken medications that affect  your immune system, such as prednisone, other steroids, or anticancer drugs; drugs for the treatment of rheumatoid arthritis, Crohn s disease, or psoriasis; or have you had radiation treatments?   No   Have you had a seizure, or a brain or other nervous system problem?   No   During the past year, have you received a transfusion of blood or blood    products, or been given immune (gamma) globulin or antiviral drug?   No   For women: Are you pregnant or is there a chance you could become       pregnant during the next month?   No   Have you received any vaccinations in the past 4 weeks?   No     Immunization questionnaire answers were all negative.        Per orders of Dr. Swan, injection of shingrix given by Fariba Espinoza. Patient instructed to remain in clinic for 15 minutes afterwards, and to report any adverse reaction to me immediately.       Screening performed by Fariba Espinoza on 9/9/2020 at 1:38 PM.

## 2020-09-10 LAB
ALBUMIN SERPL-MCNC: 3.8 G/DL (ref 3.4–5)
ALP SERPL-CCNC: 51 U/L (ref 40–150)
ALT SERPL W P-5'-P-CCNC: 28 U/L (ref 0–70)
ANION GAP SERPL CALCULATED.3IONS-SCNC: 8 MMOL/L (ref 3–14)
AST SERPL W P-5'-P-CCNC: 20 U/L (ref 0–45)
BILIRUB SERPL-MCNC: 0.8 MG/DL (ref 0.2–1.3)
BUN SERPL-MCNC: 21 MG/DL (ref 7–30)
CALCIUM SERPL-MCNC: 8.7 MG/DL (ref 8.5–10.1)
CHLORIDE SERPL-SCNC: 108 MMOL/L (ref 94–109)
CHOLEST SERPL-MCNC: 174 MG/DL
CO2 SERPL-SCNC: 23 MMOL/L (ref 20–32)
CREAT SERPL-MCNC: 1.17 MG/DL (ref 0.66–1.25)
GFR SERPL CREATININE-BSD FRML MDRD: 70 ML/MIN/{1.73_M2}
GLUCOSE SERPL-MCNC: 94 MG/DL (ref 70–99)
HDLC SERPL-MCNC: 51 MG/DL
LDLC SERPL CALC-MCNC: 94 MG/DL
NONHDLC SERPL-MCNC: 123 MG/DL
POTASSIUM SERPL-SCNC: 4.1 MMOL/L (ref 3.4–5.3)
PROT SERPL-MCNC: 7.3 G/DL (ref 6.8–8.8)
PSA SERPL-ACNC: 1.25 UG/L (ref 0–4)
SODIUM SERPL-SCNC: 139 MMOL/L (ref 133–144)
TRIGL SERPL-MCNC: 147 MG/DL

## 2020-09-29 ENCOUNTER — TELEPHONE (OUTPATIENT)
Dept: CARDIOLOGY | Facility: CLINIC | Age: 55
End: 2020-09-29

## 2020-09-29 NOTE — TELEPHONE ENCOUNTER
Called pt and left voicemail letting him know that the correct Cardiac Event Monitor with patches will be over nighted to him. Also asked Manjinder to please send the current monitor that he has back to [facility]. Spoke to James davila from [facility] and he said he will order the correct monitor and a new order is not needed.     Samson

## 2020-10-12 ENCOUNTER — TELEPHONE (OUTPATIENT)
Dept: CARDIOLOGY | Facility: CLINIC | Age: 55
End: 2020-10-12

## 2020-10-12 NOTE — TELEPHONE ENCOUNTER
Left two messages with pt to reschedule 12/3 appt with Dr. Hendrickson.     Per protocol, appt was cancelled and a letter was sent to get appt rescheduled.    [facility] only does virtual visits for now.

## 2020-11-06 DIAGNOSIS — I48.0 PAROXYSMAL ATRIAL FIBRILLATION (H): ICD-10-CM

## 2020-12-09 ENCOUNTER — TRANSFERRED RECORDS (OUTPATIENT)
Dept: HEALTH INFORMATION MANAGEMENT | Facility: CLINIC | Age: 55
End: 2020-12-09

## 2021-01-28 ENCOUNTER — VIRTUAL VISIT (OUTPATIENT)
Dept: CARDIOLOGY | Facility: CLINIC | Age: 56
End: 2021-01-28
Attending: INTERNAL MEDICINE
Payer: COMMERCIAL

## 2021-01-28 DIAGNOSIS — I48.0 PAROXYSMAL ATRIAL FIBRILLATION (H): Primary | ICD-10-CM

## 2021-01-28 NOTE — LETTER
1/28/2021      RE: Manjinder Esparza  [id number] [facility] [address]       Dear Colleague,    Thank you for the opportunity to participate in the care of your patient, Manjinder Esparza, at the [facility]. Please see a copy of my visit note below.      No Show        Please do not hesitate to contact me if you have any questions/concerns.     Sincerely,     Tr Hendrickson MD

## 2021-02-18 ENCOUNTER — DOCUMENTATION ONLY (OUTPATIENT)
Dept: HOME HEALTH SERVICES | Facility: CLINIC | Age: 56
End: 2021-02-18

## 2021-02-18 NOTE — PROGRESS NOTES
Patient walked into [facility] [place] showroom on February 18, 2021 to inquire about getting a replacement CPAP and the differences between the CPAP machines we carry.  Provided sheet with both Resmed Airsense 10 and Joseluis RespirCorimmuns Dreamstation machines and the differences between the two.  Informed patient that he will need to have an appointment with provider as physician notes are required to billing insurance for a replacement device.  Patient understood.    Patient also inquired about travel CPAPs.  Let patient know the price range of the travel machines and that they are not covered by insurance.  Also informed patient that it is not recommended to use in place of a standard CPAP.    Patient asked if we could calibrate his old machine.  Told patient that we can program his old device to pressure settings provider will write in replacement CPAP order.   Patient's old device (Resemd S8) is no longer serviced by [employer], so we would not be able to send it in for calibration.

## 2021-02-19 DIAGNOSIS — G47.33 OSA (OBSTRUCTIVE SLEEP APNEA): Primary | ICD-10-CM

## 2021-04-28 ENCOUNTER — IMMUNIZATION (OUTPATIENT)
Dept: NURSING | Facility: CLINIC | Age: 56
End: 2021-04-28
Payer: COMMERCIAL

## 2021-04-28 PROCEDURE — 91300 PR COVID VAC PFIZER DIL RECON 30 MCG/0.3 ML IM: CPT

## 2021-04-28 PROCEDURE — 0001A PR COVID VAC PFIZER DIL RECON 30 MCG/0.3 ML IM: CPT

## 2021-05-19 ENCOUNTER — IMMUNIZATION (OUTPATIENT)
Dept: NURSING | Facility: CLINIC | Age: 56
End: 2021-05-19
Attending: INTERNAL MEDICINE
Payer: COMMERCIAL

## 2021-05-19 DIAGNOSIS — I47.10 PAROXYSMAL SUPRAVENTRICULAR TACHYCARDIA (H): ICD-10-CM

## 2021-05-19 DIAGNOSIS — I10 HYPERTENSION, UNSPECIFIED TYPE: ICD-10-CM

## 2021-05-19 PROCEDURE — 0002A PR COVID VAC PFIZER DIL RECON 30 MCG/0.3 ML IM: CPT

## 2021-05-19 PROCEDURE — 91300 PR COVID VAC PFIZER DIL RECON 30 MCG/0.3 ML IM: CPT

## 2021-05-20 RX ORDER — METOPROLOL SUCCINATE 50 MG/1
TABLET, EXTENDED RELEASE ORAL
Qty: 90 TABLET | Refills: 0 | Status: SHIPPED | OUTPATIENT
Start: 2021-05-20 | End: 2021-07-15

## 2021-05-20 NOTE — TELEPHONE ENCOUNTER
Patient no-showed appt with Dr. Hendrickson 1/28/21. Prior to that patient completed a virtual visit on 5/14/20 with a recommended follow up of Nov '20.    patient was provided with a one time 90 day supply of his Metoprolol.  I am sending a mychart note to patient asking him to make an appt so that we can continue to fill his medication. Sharita Cabrera RN on 5/20/2021 at 12:21 PM

## 2021-07-15 ENCOUNTER — MYC MEDICAL ADVICE (OUTPATIENT)
Dept: CARDIOLOGY | Facility: CLINIC | Age: 56
End: 2021-07-15

## 2021-07-15 DIAGNOSIS — I10 HYPERTENSION, UNSPECIFIED TYPE: ICD-10-CM

## 2021-07-15 DIAGNOSIS — I47.10 PAROXYSMAL SUPRAVENTRICULAR TACHYCARDIA (H): ICD-10-CM

## 2021-07-15 RX ORDER — METOPROLOL SUCCINATE 50 MG/1
TABLET, EXTENDED RELEASE ORAL
Qty: 90 TABLET | Refills: 3 | Status: SHIPPED | OUTPATIENT
Start: 2021-07-15 | End: 2022-06-29

## 2021-07-27 ENCOUNTER — NURSE TRIAGE (OUTPATIENT)
Dept: NURSING | Facility: CLINIC | Age: 56
End: 2021-07-27

## 2021-07-27 ENCOUNTER — OFFICE VISIT (OUTPATIENT)
Dept: URGENT CARE | Facility: URGENT CARE | Age: 56
End: 2021-07-27
Payer: COMMERCIAL

## 2021-07-27 VITALS
WEIGHT: 195 LBS | HEART RATE: 90 BPM | OXYGEN SATURATION: 97 % | HEIGHT: 72 IN | BODY MASS INDEX: 26.41 KG/M2 | SYSTOLIC BLOOD PRESSURE: 142 MMHG | DIASTOLIC BLOOD PRESSURE: 91 MMHG

## 2021-07-27 DIAGNOSIS — R07.89 CHEST DISCOMFORT: ICD-10-CM

## 2021-07-27 DIAGNOSIS — R00.0 TACHYCARDIA: Primary | ICD-10-CM

## 2021-07-27 LAB
ERYTHROCYTE [SEDIMENTATION RATE] IN BLOOD BY WESTERGREN METHOD: 4 MM/HR (ref 0–20)
TROPONIN I SERPL-MCNC: <0.015 UG/L (ref 0–0.04)

## 2021-07-27 PROCEDURE — 36415 COLL VENOUS BLD VENIPUNCTURE: CPT | Performed by: FAMILY MEDICINE

## 2021-07-27 PROCEDURE — 93000 ELECTROCARDIOGRAM COMPLETE: CPT | Performed by: FAMILY MEDICINE

## 2021-07-27 PROCEDURE — 84484 ASSAY OF TROPONIN QUANT: CPT | Performed by: FAMILY MEDICINE

## 2021-07-27 PROCEDURE — 99214 OFFICE O/P EST MOD 30 MIN: CPT | Performed by: FAMILY MEDICINE

## 2021-07-27 PROCEDURE — 85652 RBC SED RATE AUTOMATED: CPT | Performed by: FAMILY MEDICINE

## 2021-07-27 ASSESSMENT — MIFFLIN-ST. JEOR: SCORE: 1757.51

## 2021-07-27 NOTE — TELEPHONE ENCOUNTER
Manjinder calls to ask if he can head to  for an EKG.   Plans to go to [place].    Reports history of a-fib.  Mild chest pain, some back pain.  Heart rate 85-90, baseline 60.    States this is his usual symptoms when he has a-fib episodes.    Per protocol, advised OV today. Care advice reviewed. Patient verbalizes understanding and will go to [facility].     Leena Do RN/[place] Nurse Advisor        Reason for Disposition    Patient wants to be seen    Additional Information    Negative: Passed out (i.e., fainted, collapsed and was not responding)    Negative: Shock suspected (e.g., cold/pale/clammy skin, too weak to stand, low BP, rapid pulse)    Negative: Difficult to awaken or acting confused (e.g., disoriented, slurred speech)    Negative: Visible sweat on face or sweat dripping down face    Negative: Unable to walk, or can only walk with assistance (e.g., requires support)    Negative: Received SHOCK from implantable cardiac defibrillator and has persisting symptoms (i.e., palpitations, lightheadedness)    Negative: Dizziness, lightheadedness, or weakness and heart beating very rapidly (e.g., > 140 / minute)    Negative: Dizziness, lightheadedness, or weakness and heart beating very slowly (e.g., < 50 / minute)    Negative: Sounds like a life-threatening emergency to the triager    Negative: Difficulty breathing    Negative: Dizziness, lightheadedness, or weakness    Negative: Heart beating very rapidly (e.g., > 140 / minute) and present now (Exception: during exercise)    Negative: Heart beating very slowly (e.g., < 50 / minute) (Exception: athlete)    Negative: New or worsened shortness of breath with activity (dyspnea on exertion)    Negative: Patient sounds very sick or weak to the triager    Negative: Wearing a 'Holter monitor' or 'cardiac event monitor'    Negative: Received SHOCK from implantable cardiac defibrillator (and now feels well)    Negative: Heart beating very rapidly (e.g., > 140 /  minute) and not present now (Exception: during exercise)    Negative: Skipped or extra beat(s) and increases with exercise or exertion    Negative: Skipped or extra beat(s) and occurs 4 or more times per minute    Negative: History of heart disease (i.e., heart attack, bypass surgery, angina, angioplasty)    Negative: Age > 60 years    Negative: Taking water pill (i.e., diuretic) or heart medication (e.g., digoxin)    Protocols used: HEART RATE AND HEARTBEAT [id number]

## 2021-07-27 NOTE — PROGRESS NOTES
Chief Complaint   Patient presents with     Urgent Care     Pt in clinic to have eval for rapid heart rate.     Tachycardia       Medical Decision Making:    ASSESMENT AND PLAN   Manjinder was seen today for urgent care and tachycardia.    Diagnoses and all orders for this visit:    Tachycardia  -     EKG 12-lead complete w/read - Clinics    Chest discomfort  -     ESR: Erythrocyte sedimentation rate; Future  -     Troponin I; Future  -     ESR: Erythrocyte sedimentation rate  -     Troponin I      All labs within normal limits  Pt notified about labs results     Tylenol, Fluids and Rest  Was with patient to try Prilosec to help with the symptoms of GI discomfort .  I reviewed with patient if symptoms do not get better and if chest pain comes back should follow-up in the ER.  I have reviewed the nursing notes.    Differential Diagnosis:  Cardiac: Acute MI  Chest wall Pain  Pleurisy  GERD  Palpitations  Atrial Fibrillation  Pericarditis  Panic/Anxiety    Review of the result(s) of each unique test -    X-Ray was not done.    Time  spent on the date of the encounter doing chart review, review of test results, interpretation of tests, patient visit and documentation     If not improving or if conditions worsens over the next 12-24 hours, go to the Emergency Department    see orders in Epic  Pt verbalized and agreed with the plan and is aware of the worsening symptoms for which would need to follow up .  Pt was stable during time of discharge from the clinic     SUBJECTIVE     Manjinder Esparza is a 56 year old male presenting with a chief complaint of    Chief Complaint   Patient presents with     Urgent Care     Pt in clinic to have eval for rapid heart rate.     Tachycardia     Cardiac Event    Symptom Onset: 3 day(s) ago.  Timing of illness: gradual onset.  Current and Associated symptoms: chest discomfort and palpitations.  Character: sharp.  Severity mild.  Location: left chest.  Radiation: none.  Associated  Symptoms: none.  Exacerbated by: nothing.  Relieved by: exercising .  Cardiac risk factors: h/o afib 3yrs back was on blood thinner  Is currently on metoprolol and aspirin.  3 days back he was in a party with friends and drank more than what he normally does and did feel some upper epigastric area discomfort with some irritation in that area.  Then he did feel better but then again yesterday he went for a wedding where he did have more drinks and following which started noticing similar symptoms and felt like his heart was beating fast.  He does have a monitor which did not show any A. fib he denies any shortness of breath or any acute chest pain denies any chest heaviness symptoms currently.              Past Medical History:   Diagnosis Date     Atrial fibrillation (H)      Gastroesophageal reflux disease      Hypertension      Current Outpatient Medications   Medication Sig Dispense Refill     aspirin (ASA) 81 MG chewable tablet Take 81 mg by mouth daily       metoprolol succinate ER (TOPROL-XL) 50 MG 24 hr tablet TAKE 1 TABLET BY MOUTH DAILY. DO NOT CRUSH. MAY SPLIT IN HALF ALONG SCORE LINE 90 tablet 3     ranitidine (ZANTAC) 150 MG tablet Take 1 tablet (150 mg) by mouth 2 times daily (Patient not taking: Reported on 9/26/2019)       Social History     Tobacco Use     Smoking status: Never Smoker     Smokeless tobacco: Never Used   Substance Use Topics     Alcohol use: Yes     Alcohol/week: 6.0 - 9.0 standard drinks     Types: 6 - 9 Cans of beer per week     Comment: 10-14 drinks a week      Family History   Problem Relation Age of Onset     Diabetes Mother         type 2     Hypertension Mother      Cancer - colorectal Mother      Circulatory Mother         varicose veins     Eye Disorder Mother         cataracts     Hypertension Father      Prostate Cancer Father      Arthritis Father         in his knees     Lipids Father      Eye Disorder Maternal Grandmother         cataracts     Cancer Maternal Grandmother       Cancer Maternal Grandfather         lung     Cerebrovascular Disease Paternal Grandmother      Allergies Other         self, developing allergies      Gastrointestinal Disease Other         self, may have ibs     Lipids Brother      Prostate Cancer Brother          ROS:    10 point ROS of systems including Constitutional, Eyes, Respiratory,Gastroenterology, Genitourinary, Integumentary, Muscularskeletal, Psychiatric ,neurological were all negative except for pertinent positives noted in my HPI         OBJECTIVE:    BP (!) 142/91   Pulse 90   Ht 1.829 m (6')   Wt 88.5 kg (195 lb)   SpO2 97%   BMI 26.45 kg/m    GENERAL APPEARANCE: healthy, alert and no distress  EYES: EOMI,  PERRL, conjunctiva clear  HENT: ear canals and TM's normal.  Nose and mouth without ulcers, erythema or lesions  NECK: supple, nontender, no lymphadenopathy  RESP: lungs clear to auscultation - no rales, rhonchi or wheezes  CV: regular rates and rhythm, normal S1 S2, no murmur noted  ABDOMEN:  soft, nontender, no HSM or masses and bowel sounds normal  SKIN: no suspicious lesions or rashes  PSYCH: mentation appears normal  Physical Exam      (Note was completed, in part, with [employer] voice-recognition software. Documentation reviewed, but some grammatical, spelling, and word errors may remain.)  Vicki Dickinson MD on 7/27/2021 at 7:40 PM

## 2021-07-28 ENCOUNTER — MYC MEDICAL ADVICE (OUTPATIENT)
Dept: CARDIOLOGY | Facility: CLINIC | Age: 56
End: 2021-07-28

## 2021-07-28 DIAGNOSIS — I48.0 PAROXYSMAL ATRIAL FIBRILLATION (H): Primary | ICD-10-CM

## 2021-07-28 DIAGNOSIS — I10 BENIGN ESSENTIAL HYPERTENSION: ICD-10-CM

## 2021-07-28 DIAGNOSIS — I47.10 PAROXYSMAL SUPRAVENTRICULAR TACHYCARDIA (H): ICD-10-CM

## 2021-07-30 ENCOUNTER — MYC MEDICAL ADVICE (OUTPATIENT)
Dept: CARDIOLOGY | Facility: CLINIC | Age: 56
End: 2021-07-30

## 2021-08-09 ENCOUNTER — HOSPITAL ENCOUNTER (OUTPATIENT)
Dept: NUCLEAR MEDICINE | Facility: CLINIC | Age: 56
Setting detail: NUCLEAR MEDICINE
End: 2021-08-09
Attending: INTERNAL MEDICINE
Payer: COMMERCIAL

## 2021-08-09 ENCOUNTER — HOSPITAL ENCOUNTER (OUTPATIENT)
Dept: CARDIOLOGY | Facility: CLINIC | Age: 56
Discharge: HOME | End: 2021-08-09
Attending: INTERNAL MEDICINE | Admitting: INTERNAL MEDICINE
Payer: COMMERCIAL

## 2021-08-09 ENCOUNTER — LAB (OUTPATIENT)
Dept: LAB | Facility: CLINIC | Age: 56
End: 2021-08-09

## 2021-08-09 DIAGNOSIS — I48.0 PAROXYSMAL ATRIAL FIBRILLATION (H): ICD-10-CM

## 2021-08-09 DIAGNOSIS — I10 BENIGN ESSENTIAL HYPERTENSION: ICD-10-CM

## 2021-08-09 DIAGNOSIS — I47.10 PAROXYSMAL SUPRAVENTRICULAR TACHYCARDIA (H): ICD-10-CM

## 2021-08-09 LAB
ALBUMIN SERPL-MCNC: 4.2 G/DL (ref 3.4–5)
ALP SERPL-CCNC: 51 U/L (ref 40–150)
ALT SERPL W P-5'-P-CCNC: 24 U/L (ref 0–70)
ANION GAP SERPL CALCULATED.3IONS-SCNC: 5 MMOL/L (ref 3–14)
AST SERPL W P-5'-P-CCNC: 19 U/L (ref 0–45)
BILIRUB SERPL-MCNC: 0.8 MG/DL (ref 0.2–1.3)
BUN SERPL-MCNC: 15 MG/DL (ref 7–30)
CALCIUM SERPL-MCNC: 8.8 MG/DL (ref 8.5–10.1)
CHLORIDE BLD-SCNC: 112 MMOL/L (ref 94–109)
CO2 SERPL-SCNC: 26 MMOL/L (ref 20–32)
CREAT SERPL-MCNC: 1.1 MG/DL (ref 0.66–1.25)
CV STRESS MAX HR HE: 101
GFR SERPL CREATININE-BSD FRML MDRD: 75 ML/MIN/1.73M2
GLUCOSE BLD-MCNC: 106 MG/DL (ref 70–99)
POTASSIUM BLD-SCNC: 3.7 MMOL/L (ref 3.4–5.3)
PROT SERPL-MCNC: 7.3 G/DL (ref 6.8–8.8)
RATE PRESSURE PRODUCT: NORMAL
SODIUM SERPL-SCNC: 143 MMOL/L (ref 133–144)
STRESS ECHO BASELINE DIASTOLIC HE: 89
STRESS ECHO BASELINE HR: 57
STRESS ECHO BASELINE SYSTOLIC BP: 131
STRESS ECHO CALCULATED PERCENT HR: 61 %
STRESS ECHO LAST STRESS DIASTOLIC BP: 77
STRESS ECHO LAST STRESS SYSTOLIC BP: 130
STRESS ECHO TARGET HR: 165

## 2021-08-09 PROCEDURE — 93016 CV STRESS TEST SUPVJ ONLY: CPT | Performed by: INTERNAL MEDICINE

## 2021-08-09 PROCEDURE — A9502 TC99M TETROFOSMIN: HCPCS | Performed by: INTERNAL MEDICINE

## 2021-08-09 PROCEDURE — 93017 CV STRESS TEST TRACING ONLY: CPT

## 2021-08-09 PROCEDURE — 78452 HT MUSCLE IMAGE SPECT MULT: CPT

## 2021-08-09 PROCEDURE — 36415 COLL VENOUS BLD VENIPUNCTURE: CPT | Performed by: PATHOLOGY

## 2021-08-09 PROCEDURE — 78452 HT MUSCLE IMAGE SPECT MULT: CPT | Mod: 26 | Performed by: INTERNAL MEDICINE

## 2021-08-09 PROCEDURE — 93018 CV STRESS TEST I&R ONLY: CPT | Performed by: INTERNAL MEDICINE

## 2021-08-09 PROCEDURE — 80053 COMPREHEN METABOLIC PANEL: CPT | Performed by: PATHOLOGY

## 2021-08-09 PROCEDURE — 250N000011 HC RX IP 250 OP 636: Performed by: INTERNAL MEDICINE

## 2021-08-09 PROCEDURE — 343N000001 HC RX 343: Performed by: INTERNAL MEDICINE

## 2021-08-09 RX ORDER — ALBUTEROL SULFATE 90 UG/1
2 AEROSOL, METERED RESPIRATORY (INHALATION) EVERY 5 MIN PRN
Status: DISCONTINUED | OUTPATIENT
Start: 2021-08-09 | End: 2021-08-10 | Stop reason: HOSPADM

## 2021-08-09 RX ORDER — CAFFEINE CITRATE 20 MG/ML
60 SOLUTION INTRAVENOUS
Status: DISCONTINUED | OUTPATIENT
Start: 2021-08-09 | End: 2021-08-10 | Stop reason: HOSPADM

## 2021-08-09 RX ORDER — ACYCLOVIR 200 MG/1
0-1 CAPSULE ORAL
Status: DISCONTINUED | OUTPATIENT
Start: 2021-08-09 | End: 2021-08-10 | Stop reason: HOSPADM

## 2021-08-09 RX ORDER — AMINOPHYLLINE 25 MG/ML
50-100 INJECTION, SOLUTION INTRAVENOUS
Status: DISCONTINUED | OUTPATIENT
Start: 2021-08-09 | End: 2021-08-10 | Stop reason: HOSPADM

## 2021-08-09 RX ORDER — REGADENOSON 0.08 MG/ML
0.4 INJECTION, SOLUTION INTRAVENOUS ONCE
Status: COMPLETED | OUTPATIENT
Start: 2021-08-09 | End: 2021-08-09

## 2021-08-09 RX ADMIN — REGADENOSON 0.4 MG: 0.08 INJECTION, SOLUTION INTRAVENOUS at 13:58

## 2021-08-09 RX ADMIN — TETROFOSMIN 41.5 MCI.: 1.38 INJECTION, POWDER, LYOPHILIZED, FOR SOLUTION INTRAVENOUS at 14:00

## 2021-08-09 RX ADMIN — TETROFOSMIN 10.9 MCI.: 1.38 INJECTION, POWDER, LYOPHILIZED, FOR SOLUTION INTRAVENOUS at 12:54

## 2021-08-12 ENCOUNTER — VIRTUAL VISIT (OUTPATIENT)
Dept: CARDIOLOGY | Facility: CLINIC | Age: 56
End: 2021-08-12
Attending: INTERNAL MEDICINE
Payer: COMMERCIAL

## 2021-08-12 DIAGNOSIS — R07.89 CHEST WALL PAIN: Primary | ICD-10-CM

## 2021-08-12 DIAGNOSIS — I48.0 PAROXYSMAL ATRIAL FIBRILLATION (H): ICD-10-CM

## 2021-08-12 PROCEDURE — 99215 OFFICE O/P EST HI 40 MIN: CPT | Mod: GT | Performed by: INTERNAL MEDICINE

## 2021-08-12 NOTE — LETTER
8/12/2021      RE: Manjinder Esparza  [address] [place] [address]       Dear Colleague,    Thank you for the opportunity to participate in the care of your patient, Manjinder Esparza, at the [facility]. Please see a copy of my visit note below.    Manjinder Esparza is a 56 year old who is being evaluated via a billable video visit.      How would you like to obtain your AVS? MyChart  If the video visit is dropped, the invitation should be resent by: Text to cell phone: [phone]  Will anyone else be joining your video visit? No      Vitals - Patient Reported  Weight (Patient Reported): 87.1 kg (192 lb)  Pain Score: Mild Pain (2) (No SOB)    Vitals were taken and medications reconciled.    Khris Nunez, EMT  4:27 PM    HPI:     54 year old male with a history of hypertension and atrial fibrillation.  His atrial fibrillation appears to be infrequent and he is no longer anticoagulated given his age and absence of underlying structural heart disease.      He has  been complaining of some chest discomfort at rest which he ranks is a 2-3 out of 10 and which disappears or improves with working out.  Mr. Esparza is frequently doing push-ups (up to 100/day) and does note pain over the left pectoralis major muscle. He is certain it is muscular related and  points to a very localized region left of the sternum.  Why the pain disappears with physical exertion is unclear.      We recently had a Lexiscan which was normal.  He does have imaging which shows normal heart size and function with some evidence for calcification in the aorta for which he was very nervous.  We discussed this and I reassured him.    Mr. Esparza does have some complaints of fatigue during the day and believes that metoprolol is at fault.  We agreed that he will cut his metoprolol in half (25 mg daily) and if that does not improve then we may switch him to diltiazem  in the near future.  I asked him to contact us after he has made the reduction in the metoprolol dose and try to double it for a couple weeks.    Recent laboratory data is summarized briefly below    ECG 7/27/21: JUANY Scalesan undertaken on August 9, 2021  The nuclear stress test is negative for inducible myocardial ischemia or infarction.     LVEDv 72ml. LVESv 20ml. LV EF 72%.     There is no prior study for comparison.    PAST MEDICAL HISTORY:  Past Medical History:   Diagnosis Date     Atrial fibrillation (H)      Gastroesophageal reflux disease      Hypertension        CURRENT MEDICATIONS:  Current Outpatient Medications   Medication Sig Dispense Refill     aspirin (ASA) 81 MG chewable tablet Take 81 mg by mouth daily       Glucosamine Sulfate 1000 MG TABS Take 1,500 mg by mouth       metoprolol succinate ER (TOPROL-XL) 50 MG 24 hr tablet TAKE 1 TABLET BY MOUTH DAILY. DO NOT CRUSH. MAY SPLIT IN HALF ALONG SCORE LINE 90 tablet 3     ranitidine (ZANTAC) 150 MG tablet Take 1 tablet (150 mg) by mouth 2 times daily (Patient not taking: Reported on 9/26/2019)         PAST SURGICAL HISTORY:  Past Surgical History:   Procedure Laterality Date     ANESTHESIA CARDIOVERSION N/A 6/29/2018    Procedure: ANESTHESIA CARDIOVERSION;  Cardioversion;  Surgeon: GENERIC ANESTHESIA PROVIDER;  Location: [facility] OR     SURGICAL HISTORY OF -       animal bone graft in the left lower jaw       ALLERGIES:     Allergies   Allergen Reactions     Lisinopril      cough       FAMILY HISTORY:  Family History   Problem Relation Age of Onset     Diabetes Mother         type 2     Hypertension Mother      Cancer - colorectal Mother      Circulatory Mother         varicose veins     Eye Disorder Mother         cataracts     Hypertension Father      Prostate Cancer Father      Arthritis Father         in his knees     Lipids Father      Eye Disorder Maternal Grandmother         cataracts     Cancer Maternal Grandmother      Cancer Maternal Grandfather          lung     Cerebrovascular Disease Paternal Grandmother      Allergies Other         self, developing allergies      Gastrointestinal Disease Other         self, may have ibs     Lipids Brother      Prostate Cancer Brother        SOCIAL HISTORY:  Social History     Tobacco Use     Smoking status: Never Smoker     Smokeless tobacco: Never Used   Substance Use Topics     Alcohol use: Yes     Alcohol/week: 6.0 - 9.0 standard drinks     Types: 6 - 9 Cans of beer per week     Comment: 10-14 drinks a week      Drug use: No       ROS:   Constitutional: No fever, chills, or sweats. Weight stable.   ENT: No visual disturbance,.   Cardiovascular: As per HPI.   Respiratory: No cough, hemoptysis.    GI: No nausea, vomiting, hematemesis, melena, or hematochezia.   : No hematuria.   Integument: Negative.   Psychiatric: Negative.   Hematologic:  Easy bruising, no easy bleeding.  Neuro: Negative.   Endocrinology: No significant heat or cold intolerance   Musculoskeletal: Chest pain as described in HPI.    Exam:  There were no vitals taken for this visit.  GENERAL APPEARANCE: healthy, alert and no distress  HEENT: no icterus, no central cyanosis  NECK:JVP not elevated  RESPIRATORY:no rales, rhonchi or wheezes, no use of accessory muscles, no retractions, respirations are unlabored, normal respiratory rate  NEURO: alert and oriented to person/place/time, normal speech,and affect  SKIN: no ecchymoses, no rashes    Labs:  CBC RESULTS:   Lab Results   Component Value Date    WBC 6.9 10/01/2018    RBC 4.54 10/01/2018    HGB 14.2 10/01/2018    HCT 40.7 10/01/2018    MCV 90 10/01/2018    MCH 31.3 10/01/2018    MCHC 34.9 10/01/2018    RDW 12.7 10/01/2018     10/01/2018       BMP RESULTS:  Lab Results   Component Value Date     08/09/2021     09/09/2020    POTASSIUM 3.7 08/09/2021    POTASSIUM 4.1 09/09/2020    CHLORIDE 112 (H) 08/09/2021    CHLORIDE 108 09/09/2020    CO2 26 08/09/2021    CO2 23 09/09/2020     ANIONGAP 5 08/09/2021    ANIONGAP 8 09/09/2020     (H) 08/09/2021    GLC 94 09/09/2020    BUN 15 08/09/2021    BUN 21 09/09/2020    CR 1.10 08/09/2021    CR 1.17 09/09/2020    GFRESTIMATED 75 08/09/2021    GFRESTIMATED 70 09/09/2020    GFRESTBLACK 81 09/09/2020    JESUS 8.8 08/09/2021    JESUS 8.7 09/09/2020        INR RESULTS:  Lab Results   Component Value Date    INR 1.18 (H) 07/16/2018    INR 1.16 (H) 06/29/2018       Procedures:  PULMONARY FUNCTION TESTS:   No flowsheet data found.      ECHOCARDIOGRAM:   See Lexiscan data above    Assessment and Plan:  1.  Infrequent atrial fibrillation-we did review the fact that he may require anticoagulation later in life based on [id number]  2.  Chest pain which is almost certainly musculoskeletal in nature-worse at rest and improved with physical activity.  Not cardiac in origin but why a musculoskeletal pain improves with activity is unclear  3.  Imaging showing aortic calcification present    Plan  1.  Reassurance regarding cardiac and A. fib status at present  2.  Follow-up in 1 years time but call if symptoms are not improved (see #3 below)  3.  Possible fatigue related to metoprolol dosing.  Should the patient not find benefit by reducing the dose in half we may wish to switch him to diltiazem    Total elapsed time today with chart review, face-to-face and documentation 45 minutes    Video on 5: 00 p.m.-off 5: 20 p.m.   platform [employer]  Patient at home; clinic [facility] heart        CC  SELF, REFERRED        Please do not hesitate to contact me if you have any questions/concerns.     Sincerely,     Tr Hendrickson MD

## 2021-09-12 ENCOUNTER — HEALTH MAINTENANCE LETTER (OUTPATIENT)
Age: 56
End: 2021-09-12

## 2021-09-16 ENCOUNTER — OFFICE VISIT (OUTPATIENT)
Dept: FAMILY MEDICINE | Facility: CLINIC | Age: 56
End: 2021-09-16
Payer: COMMERCIAL

## 2021-09-16 ENCOUNTER — ANCILLARY PROCEDURE (OUTPATIENT)
Dept: GENERAL RADIOLOGY | Facility: CLINIC | Age: 56
End: 2021-09-16
Attending: FAMILY MEDICINE
Payer: COMMERCIAL

## 2021-09-16 VITALS
OXYGEN SATURATION: 98 % | SYSTOLIC BLOOD PRESSURE: 147 MMHG | DIASTOLIC BLOOD PRESSURE: 92 MMHG | WEIGHT: 198 LBS | HEART RATE: 57 BPM | HEIGHT: 71 IN | RESPIRATION RATE: 16 BRPM | TEMPERATURE: 98 F | BODY MASS INDEX: 27.72 KG/M2

## 2021-09-16 DIAGNOSIS — Z11.59 ENCOUNTER FOR HEPATITIS C SCREENING TEST FOR LOW RISK PATIENT: ICD-10-CM

## 2021-09-16 DIAGNOSIS — R07.89 STERNUM PAIN: ICD-10-CM

## 2021-09-16 DIAGNOSIS — Z23 NEEDS FLU SHOT: ICD-10-CM

## 2021-09-16 DIAGNOSIS — Z00.00 ROUTINE GENERAL MEDICAL EXAMINATION AT A HEALTH CARE FACILITY: Primary | ICD-10-CM

## 2021-09-16 DIAGNOSIS — R20.2 NUMBNESS AND TINGLING IN LEFT ARM: ICD-10-CM

## 2021-09-16 DIAGNOSIS — G47.33 OSA (OBSTRUCTIVE SLEEP APNEA): ICD-10-CM

## 2021-09-16 DIAGNOSIS — Z12.5 SCREENING FOR PROSTATE CANCER: ICD-10-CM

## 2021-09-16 DIAGNOSIS — Z12.11 SCREEN FOR COLON CANCER: ICD-10-CM

## 2021-09-16 DIAGNOSIS — I10 BENIGN ESSENTIAL HYPERTENSION: ICD-10-CM

## 2021-09-16 DIAGNOSIS — R20.0 NUMBNESS AND TINGLING IN LEFT ARM: ICD-10-CM

## 2021-09-16 DIAGNOSIS — H93.13 TINNITUS, BILATERAL: ICD-10-CM

## 2021-09-16 DIAGNOSIS — R42 DIZZINESS: ICD-10-CM

## 2021-09-16 DIAGNOSIS — F10.90 HEAVY ALCOHOL USE: ICD-10-CM

## 2021-09-16 DIAGNOSIS — Z23 NEED FOR SHINGLES VACCINE: ICD-10-CM

## 2021-09-16 LAB
BASOPHILS # BLD AUTO: 0.1 10E3/UL (ref 0–0.2)
BASOPHILS NFR BLD AUTO: 1 %
EOSINOPHIL # BLD AUTO: 0.2 10E3/UL (ref 0–0.7)
EOSINOPHIL NFR BLD AUTO: 3 %
ERYTHROCYTE [DISTWIDTH] IN BLOOD BY AUTOMATED COUNT: 11.8 % (ref 10–15)
HCT VFR BLD AUTO: 43.2 % (ref 40–53)
HCV AB SERPL QL IA: NONREACTIVE
HGB BLD-MCNC: 14.6 G/DL (ref 13.3–17.7)
IMM GRANULOCYTES # BLD: 0 10E3/UL
IMM GRANULOCYTES NFR BLD: 1 %
LYMPHOCYTES # BLD AUTO: 1.8 10E3/UL (ref 0.8–5.3)
LYMPHOCYTES NFR BLD AUTO: 31 %
MCH RBC QN AUTO: 30.7 PG (ref 26.5–33)
MCHC RBC AUTO-ENTMCNC: 33.8 G/DL (ref 31.5–36.5)
MCV RBC AUTO: 91 FL (ref 78–100)
MONOCYTES # BLD AUTO: 0.7 10E3/UL (ref 0–1.3)
MONOCYTES NFR BLD AUTO: 12 %
NEUTROPHILS # BLD AUTO: 3.2 10E3/UL (ref 1.6–8.3)
NEUTROPHILS NFR BLD AUTO: 53 %
PLATELET # BLD AUTO: 203 10E3/UL (ref 150–450)
RBC # BLD AUTO: 4.76 10E6/UL (ref 4.4–5.9)
WBC # BLD AUTO: 6 10E3/UL (ref 4–11)

## 2021-09-16 PROCEDURE — 36415 COLL VENOUS BLD VENIPUNCTURE: CPT | Performed by: FAMILY MEDICINE

## 2021-09-16 PROCEDURE — 90472 IMMUNIZATION ADMIN EACH ADD: CPT | Performed by: FAMILY MEDICINE

## 2021-09-16 PROCEDURE — 99396 PREV VISIT EST AGE 40-64: CPT | Mod: 25 | Performed by: FAMILY MEDICINE

## 2021-09-16 PROCEDURE — 90471 IMMUNIZATION ADMIN: CPT | Performed by: FAMILY MEDICINE

## 2021-09-16 PROCEDURE — 86803 HEPATITIS C AB TEST: CPT | Performed by: FAMILY MEDICINE

## 2021-09-16 PROCEDURE — 72040 X-RAY EXAM NECK SPINE 2-3 VW: CPT | Performed by: RADIOLOGY

## 2021-09-16 PROCEDURE — 90750 HZV VACC RECOMBINANT IM: CPT | Performed by: FAMILY MEDICINE

## 2021-09-16 PROCEDURE — 80050 GENERAL HEALTH PANEL: CPT | Performed by: FAMILY MEDICINE

## 2021-09-16 PROCEDURE — 90682 RIV4 VACC RECOMBINANT DNA IM: CPT | Performed by: FAMILY MEDICINE

## 2021-09-16 PROCEDURE — G0103 PSA SCREENING: HCPCS | Performed by: FAMILY MEDICINE

## 2021-09-16 PROCEDURE — 99214 OFFICE O/P EST MOD 30 MIN: CPT | Mod: 25 | Performed by: FAMILY MEDICINE

## 2021-09-16 ASSESSMENT — ENCOUNTER SYMPTOMS
EYE PAIN: 0
WEAKNESS: 0
HEADACHES: 1
CONSTIPATION: 0
SHORTNESS OF BREATH: 0
HEMATOCHEZIA: 0
FREQUENCY: 0
JOINT SWELLING: 0
ABDOMINAL PAIN: 0
COUGH: 1
SORE THROAT: 0
CHILLS: 0
NAUSEA: 1
NERVOUS/ANXIOUS: 0
DIZZINESS: 1
PALPITATIONS: 1
PARESTHESIAS: 1
HEMATURIA: 0
DIARRHEA: 0
HEARTBURN: 1
DYSURIA: 0
FEVER: 0
MYALGIAS: 0
ARTHRALGIAS: 0

## 2021-09-16 ASSESSMENT — MIFFLIN-ST. JEOR: SCORE: 1755.25

## 2021-09-16 NOTE — PROGRESS NOTES
"SUBJECTIVE:   CC: Manjinder Esparza is an 56 year old male who presents for preventative health visit.     Sternum pain  He's had \"electical shock\" pain of his left upper sternal border associated with pain to the touch and some swelling, onset when was doing more intense exercise (push ups).     Dizziness  Intermittent, sporadic, with no triggers noted, not interfering with activities.  No falls, syncope or near syncope noted.  Not associated with chest pain or sob.    Left arm numbness and tingling  Also noticed after starting more intense exercise, described as a cold feeling from his left shoulder down past his left arm, not associated with reduction of ROM , not aggravated by lifting his arm over his head, and no hand  weakness.    Elevated blood pressure  BP Readings from Last 3 Encounters:   09/16/21 (!) 147/92   07/27/21 (!) 142/91   09/09/20 126/86    currently on metoprolol for afib, takes in the morning, has been on stable dose for 5 years.  Lisinopril made him cough.  He's been checking every morning, first value high, usually higher with repeat, 120's/80's usually atome    CAIO  He's had sleep apnea for 10 years, using cpap for 1 year, reports sleeping well    Tinnitus  Worsening recently, he reports some decreased hearing      Patient has been advised of split billing requirements and indicates understanding: Yes  Healthy Habits:     Getting at least 3 servings of Calcium per day:  Yes    Bi-annual eye exam:  Yes    Dental care twice a year:  Yes    Sleep apnea or symptoms of sleep apnea:  Excessive snoring and Sleep apnea    Diet:  Regular (no restrictions) and Low salt    Frequency of exercise:  4-5 days/week    Duration of exercise:  30-45 minutes    Taking medications regularly:  No    Medication side effects:  Not applicable    PHQ-2 Total Score: 0    Additional concerns today:  Yes    Today's PHQ-2 Score:   PHQ-2 ( 1999 [employer]) 9/16/2021   Q1: Little interest or pleasure in doing things 0   Q2: " Feeling down, depressed or hopeless 0   PHQ-2 Score 0   Q1: Little interest or pleasure in doing things Not at all   Q2: Feeling down, depressed or hopeless Not at all   PHQ-2 Score 0       Abuse: Current or Past(Physical, Sexual or Emotional)- No  Do you feel safe in your environment? Yes    Have you ever done Advance Care Planning? (For example, a Health Directive, POLST, or a discussion with a medical provider or your loved ones about your wishes): Yes, patient states has an Advance Care Planning document and will bring a copy to the clinic.    Social History     Tobacco Use     Smoking status: Never Smoker     Smokeless tobacco: Never Used   Substance Use Topics     Alcohol use: Yes     Alcohol/week: 6.0 - 9.0 standard drinks     Types: 6 - 9 Cans of beer per week     Comment: 10-14 drinks a week      If you drink alcohol do you typically have >3 drinks per day or >7 drinks per week? Yes    Alcohol Use 9/16/2021   Prescreen: >3 drinks/day or >7 drinks/week? Yes   Prescreen: >3 drinks/day or >7 drinks/week? -   AUDIT SCORE  6     AUDIT - Alcohol Use Disorders Identification Test - Reproduced from the World Health Organization Audit 2001 (Second Edition) 9/16/2021   1.  How often do you have a drink containing alcohol? 2 to 3 times a week   2.  How many drinks containing alcohol do you have on a typical day when you are drinking? 3 or 4   3.  How often do you have five or more drinks on one occasion? Monthly   4.  How often during the last year have you found that you were not able to stop drinking once you had started? Never   5.  How often during the last year have you failed to do what was normally expected of you because of drinking? Never   6.  How often during the last year have you needed a first drink in the morning to get yourself going after a heavy drinking session? Never   7.  How often during the last year have you had a feeling of guilt or remorse after drinking? Never   8.  How often during the last  year have you been unable to remember what happened the night before because of your drinking? Never   9.  Have you or someone else been injured because of your drinking? No   10. Has a relative, friend, doctor or other health care worker been concerned about your drinking or suggested you cut down? No   TOTAL SCORE 6       Last PSA:   PSA   Date Value Ref Range Status   09/09/2020 1.25 0 - 4 ug/L Final     Comment:     Assay Method:  Chemiluminescence using Siemens Vista analyzer       Reviewed orders with patient. Reviewed health maintenance and updated orders accordingly - Yes  BP Readings from Last 3 Encounters:   09/16/21 (!) 147/92   07/27/21 (!) 142/91   09/09/20 126/86    Wt Readings from Last 3 Encounters:   09/16/21 89.8 kg (198 lb)   07/27/21 88.5 kg (195 lb)   09/09/20 88.4 kg (194 lb 12.8 oz)                  Patient Active Problem List   Diagnosis     CARDIOVASCULAR SCREENING; LDL GOAL LESS THAN 160     Benign essential hypertension     Paroxysmal A-fib (H)     Pulmonary nodule, 2mm, RML     Benign lipomatous neoplasm of skin and subcutaneous tissue of trunk     CAIO (obstructive sleep apnea)     Dizziness     Numbness and tingling in left arm     Heavy alcohol use     Past Surgical History:   Procedure Laterality Date     ANESTHESIA CARDIOVERSION N/A 6/29/2018    Procedure: ANESTHESIA CARDIOVERSION;  Cardioversion;  Surgeon: GENERIC ANESTHESIA PROVIDER;  Location: [facility] OR     SURGICAL HISTORY OF -       animal bone graft in the left lower jaw       Social History     Tobacco Use     Smoking status: Never Smoker     Smokeless tobacco: Never Used   Substance Use Topics     Alcohol use: Yes     Alcohol/week: 6.0 - 9.0 standard drinks     Types: 6 - 9 Cans of beer per week     Comment: 10-14 drinks a week      Family History   Problem Relation Age of Onset     Diabetes Mother         type 2     Hypertension Mother      Cancer - colorectal Mother 65     Circulatory Mother         varicose veins     Cataracts  Mother         cataracts     Atrial fibrillation Mother      Hypertension Father      Prostate Cancer Father      Arthritis Father         in his knees     Lipids Father      Lipids Brother      Prostate Cancer Brother      Eye Disorder Maternal Grandmother         cataracts     Cancer Maternal Grandmother      Cancer Maternal Grandfather         lung     Cerebrovascular Disease Paternal Grandmother      Allergies Other         self, developing allergies      Gastrointestinal Disease Other         self, may have ibs         Current Outpatient Medications   Medication Sig Dispense Refill     aspirin (ASA) 81 MG chewable tablet Take 81 mg by mouth daily       Glucosamine Sulfate 1000 MG TABS Take 1,500 mg by mouth       metoprolol succinate ER (TOPROL-XL) 50 MG 24 hr tablet TAKE 1 TABLET BY MOUTH DAILY. DO NOT CRUSH. MAY SPLIT IN HALF ALONG SCORE LINE 90 tablet 3     Allergies   Allergen Reactions     Lisinopril Cough     Recent Labs   Lab Test 08/09/21  1551 09/09/20  1226 07/18/19  1638 07/18/19  1638 10/01/18  1416 07/19/18  1208 07/16/18  1728 07/10/18  1130 06/28/18  1829 06/28/18  0930   A1C  --   --   --   --   --   --   --  5.4  --   --    LDL  --  94  --  80  --  75  --   --   --   --    HDL  --  51  --  36*  --  44  --   --   --   --    TRIG  --  147  --  145  --  113  --   --   --   --    ALT 24 28  --  24  --   --    < >  --   --  22   CR 1.10 1.17   < > 1.09   < >  --    < >  --    < > 1.22   GFRESTIMATED 75 70   < > 77   < >  --    < >  --    < > 62   GFRESTBLACK  --  81  --  89   < >  --    < >  --    < > 75   POTASSIUM 3.7 4.1   < > 3.9   < >  --    < >  --    < > 4.1   TSH  --   --   --  2.22  --   --   --   --   --  1.94    < > = values in this interval not displayed.        Reviewed and updated as needed this visit by clinical staff  Tobacco  Allergies  Meds  Problems  Med Hx  Surg Hx  Fam Hx  Soc Hx          Reviewed and updated as needed this visit by Provider     Problems  Med Hx          "      Review of Systems   Constitutional: Negative for chills and fever.   HENT: Negative for congestion, ear pain, hearing loss and sore throat.    Eyes: Negative for pain and visual disturbance.   Respiratory: Positive for cough. Negative for shortness of breath.    Cardiovascular: Positive for chest pain and palpitations. Negative for peripheral edema.   Gastrointestinal: Positive for heartburn and nausea. Negative for abdominal pain, constipation, diarrhea and hematochezia.   Genitourinary: Negative for discharge, dysuria, frequency, genital sores, hematuria, impotence and urgency.   Musculoskeletal: Negative for arthralgias, joint swelling and myalgias.   Skin: Negative for rash.   Neurological: Positive for dizziness, headaches and paresthesias. Negative for weakness.   Psychiatric/Behavioral: Negative for mood changes. The patient is not nervous/anxious.          OBJECTIVE:   BP (!) 147/92 (BP Location: Right arm, Patient Position: Chair, Cuff Size: Adult Regular)   Pulse 57   Temp 98  F (36.7  C) (Tympanic)   Resp 16   Ht 1.803 m (5' 11\")   Wt 89.8 kg (198 lb)   SpO2 98%   BMI 27.62 kg/m      Physical Exam  GENERAL: healthy, alert and no distress  EYES: Eyes grossly normal to inspection, PERRL and conjunctivae and sclerae normal  HENT: ear canals and TM's normal, nose and mouth without ulcers or lesions  NECK: no adenopathy, no asymmetry, masses, or scars and thyroid normal to palpation  RESP: lungs clear to auscultation - no rales, rhonchi or wheezes  CV: regular rate and rhythm, normal S1 S2, no S3 or S4, no murmur, click or rub, no peripheral edema and peripheral pulses strong  ABDOMEN: soft, nontender, no hepatosplenomegaly, no masses and bowel sounds normal  MS: no gross musculoskeletal defects noted, no edema  SKIN: no suspicious lesions or rashes  NEURO: Normal strength and tone, mentation intact and speech normal  PSYCH: mentation appears normal, affect normal/bright    Diagnostic Test " Results:  Labs reviewed in Epic  Results for orders placed or performed in visit on 09/16/21 (from the past 24 hour(s))   CBC with platelets and differential    Narrative    The following orders were created for panel order CBC with platelets and differential.  Procedure                               Abnormality         Status                     ---------                               -----------         ------                     CBC with platelets and d...[[id number]]                      Final result                 Please view results for these tests on the individual orders.   CBC with platelets and differential   Result Value Ref Range    WBC Count 6.0 4.0 - 11.0 10e3/uL    RBC Count 4.76 4.40 - 5.90 10e6/uL    Hemoglobin 14.6 13.3 - 17.7 g/dL    Hematocrit 43.2 40.0 - 53.0 %    MCV 91 78 - 100 fL    MCH 30.7 26.5 - 33.0 pg    MCHC 33.8 31.5 - 36.5 g/dL    RDW 11.8 10.0 - 15.0 %    Platelet Count 203 150 - 450 10e3/uL    % Neutrophils 53 %    % Lymphocytes 31 %    % Monocytes 12 %    % Eosinophils 3 %    % Basophils 1 %    % Immature Granulocytes 1 %    Absolute Neutrophils 3.2 1.6 - 8.3 10e3/uL    Absolute Lymphocytes 1.8 0.8 - 5.3 10e3/uL    Absolute Monocytes 0.7 0.0 - 1.3 10e3/uL    Absolute Eosinophils 0.2 0.0 - 0.7 10e3/uL    Absolute Basophils 0.1 0.0 - 0.2 10e3/uL    Absolute Immature Granulocytes 0.0 <=0.0 10e3/uL       ASSESSMENT/PLAN:   (Z00.00) Routine general medical examination at a health care facility  (primary encounter diagnosis)    (R07.89) Sternum pain  Comment: consistent with costochrondritis and with completed full cardiac workup  Plan: manage with nsaids, ice or heat and rest as needed    (I10) Benign essential hypertension  Comment: new diagnosis today, with normal BP at home, on BB already, I recommended increasing or adding medication today, he prefers to monitor at home and let me know if BP not at goal at home.  Plan: recheck 3 months.    (R20.0,  R20.2) Numbness and tingling in  "left arm  New, previously undiagnosed problem with uncertain prognosis and additional work-up planned.   Comment: exam not consistent with vascular steal, rotator cuff injury or impingement syndrome. Neuropathy/nerve impingement a possiblity. Obtain xray, consider referral to Ortho.  Plan: XR Cervical Spine 2/3 Views            (R42) Dizziness  Comment: New, previously undiagnosed problem with uncertain prognosis and additional work-up planned.   Obtain labs as below and follow up if symptoms worsening  Plan: Comprehensive metabolic panel (BMP + Alb, Alk         Phos, ALT, AST, Total. Bili, TP), CBC with         platelets and differential, TSH with free T4         reflex            (G47.33) CAIO (obstructive sleep apnea)  Comment: stable      (Z78.9) Heavy alcohol use  Comment: noted today, will address next visit  Plan:   Liver Function Studies - Recent Labs   Lab Test 08/09/21  1551   PROTTOTAL 7.3   ALBUMIN 4.2   BILITOTAL 0.8   ALKPHOS 51   AST 19   ALT 24     Normal lfts    (H93.13) Tinnitus, bilateral  Comment: see referral  Plan: Adult Audiology Referral            (Z12.11) Screen for colon cancer  Plan: Adult Gastro Ref - Procedure Only    (Z11.59) Encounter for hepatitis C screening test for low risk patient  Plan: Hepatitis C Screen Reflex to HCV RNA Quant and         Genotype      (Z23) Need for shingles vaccine  Plan: ZOSTER VACCINE RECOMBINANT ADJUVANTED IM NJX           (Z12.5) Screening for prostate cancer  Plan: PSA, screen      (Z23) Needs flu shot  Plan: INFLUENZA QUAD, RECOMBINANT, P-FREE (RIV4)         (FLUBLOK)    Patient has been advised of split billing requirements and indicates understanding: Yes  COUNSELING:   Reviewed preventive health counseling, as reflected in patient instructions    Estimated body mass index is 27.62 kg/m  as calculated from the following:    Height as of this encounter: 1.803 m (5' 11\").    Weight as of this encounter: 89.8 kg (198 lb).     Weight management plan: " Discussed healthy diet and exercise guidelines    He reports that he has never smoked. He has never used smokeless tobacco.      Counseling Resources:  ATP IV Guidelines  Pooled Cohorts Equation Calculator  FRAX Risk Assessment  ICSI Preventive Guidelines  Dietary Guidelines for Americans, 2010  USDA's MyPlate  ASA Prophylaxis  Lung CA Screening    Liat Bonilla MD  [facility]

## 2021-09-16 NOTE — NURSING NOTE
Prior to immunization administration, verified patients identity using patient s name and date of birth. Please see Immunization Activity for additional information.     Screening Questionnaire for Adult Immunization    Are you sick today?   No   Do you have allergies to medications, food, a vaccine component or latex?   No   Have you ever had a serious reaction after receiving a vaccination?   No   Do you have a long-term health problem with heart, lung, kidney, or metabolic disease (e.g., diabetes), asthma, a blood disorder, no spleen, complement component deficiency, a cochlear implant, or a spinal fluid leak?  Are you on long-term aspirin therapy?   Yes   Do you have cancer, leukemia, HIV/AIDS, or any other immune system problem?   No   Do you have a parent, brother, or sister with an immune system problem?   No   In the past 3 months, have you taken medications that affect  your immune system, such as prednisone, other steroids, or anticancer drugs; drugs for the treatment of rheumatoid arthritis, Crohn s disease, or psoriasis; or have you had radiation treatments?   No   Have you had a seizure, or a brain or other nervous system problem?   No   During the past year, have you received a transfusion of blood or blood    products, or been given immune (gamma) globulin or antiviral drug?   No   For women: Are you pregnant or is there a chance you could become       pregnant during the next month?   No   Have you received any vaccinations in the past 4 weeks?   No     Immunization questionnaire was positive for at least one answer.  Notified fred.        Per orders of Dr. ibarra, injection of shingrix, flublock  given by Suha Landrum. Patient instructed to remain in clinic for 15 minutes afterwards, and to report any adverse reaction to me immediately.     Suha Landrum on 9/16/2021 at 9:43 AM  Screening performed by Suha Landrum on 9/16/2021 at 9:42 AM.

## 2021-09-16 NOTE — PATIENT INSTRUCTIONS
Preventive Health Recommendations  Male Ages 50 - 64    Yearly exam:             See your health care provider every year in order to  o   Review health changes.   o   Discuss preventive care.    o   Review your medicines if your doctor has prescribed any.     Have a cholesterol test every 5 years, or more frequently if you are at risk for high cholesterol/heart disease.     Have a diabetes test (fasting glucose) every three years. If you are at risk for diabetes, you should have this test more often.     Have a colonoscopy at age 50, or have a yearly FIT test (stool test). These exams will check for colon cancer.      Talk with your health care provider about whether or not a prostate cancer screening test (PSA) is right for you.    You should be tested each year for STDs (sexually transmitted diseases), if you re at risk.     Shots: Get a flu shot each year. Get a tetanus shot every 10 years.     Nutrition:    Eat at least 5 servings of fruits and vegetables daily.     Eat whole-grain bread, whole-wheat pasta and brown rice instead of white grains and rice.     Get adequate Calcium and Vitamin D.     Lifestyle    Exercise for at least 150 minutes a week (30 minutes a day, 5 days a week). This will help you control your weight and prevent disease.     Limit alcohol to one drink per day.     No smoking.     Wear sunscreen to prevent skin cancer.     See your dentist every six months for an exam and cleaning.     See your eye doctor every 1 to 2 years.    Patient Education     Discharge Instructions for High Blood Pressure (Hypertension)  You have been diagnosed with high blood pressure (hypertension). This means the force of blood against your artery walls is too strong. It also means your heart is working hard to move blood. High blood pressure usually has no symptoms, but over time, it can cause serious health problems. High blood pressure raises your risk for heart attack, stroke, heart disease, heart failure,  kidney disease, and vision loss. With help from your doctor, you can manage your blood pressure and protect your health.  Blood pressure measurements are given as 2 numbers. Systolic blood pressure is the upper number. This is the pressure when the heart contracts or pumps. Diastolic blood pressure is the lower number. This is the pressure when the heart relaxes between beats.  Blood pressure is categorized as normal, elevated, or stage 1 or stage 2 high blood pressure:    Normal blood pressure is systolic of less than 120 and diastolic of less than 80 (120/80) at rest    Elevated blood pressure is systolic of 120 to 129 and diastolic less than 80 at rest    Stage 1 high blood pressure is systolic is 130 to 139 or diastolic between 80 to 89 at rest    Stage 2 high blood pressure is when systolic is 140 or higher or the diastolic is 90 or higher at rest  Taking medicine    Learn to measure your own blood pressure. Keep a record of your results. Ask your doctor which readings mean that you need medical attention.    Take your blood pressure medicine exactly as directed. Don t skip doses. Missing doses can cause your blood pressure to get out of control.    If you do miss a dose (or doses) check with your healthcare provider about what to do.    Don't take medicines that contain heart stimulants, including over-the-counter medicines. Check for warnings about high blood pressure on the label. Ask the pharmacist before purchasing something you haven't used before    Check with your doctor or pharmacist before taking a decongestant such as pseudoephedrine or phenylephrine. Some decongestants can worsen high blood pressure.    Lifestyle changes    Stay at a healthy weight. Get help to lose any extra pounds (kilograms). Often times meeting with a dietitian can help you identify changes that can be made to your diet to help with weight loss.    Cut back on salt.  ? Limit canned, dried, packaged, and fast foods.  ? Don t add  salt to your food at the table.  ? Season foods with herbs instead of salt when you cook.  ? Request no added salt when you go to a restaurant.  ? The American Heart Association (AHA) says the ideal amount of sodium is no more than 1,500 mg a day.  But because Americans eat so much salt, you can make a positive change by cutting back to even 2,300 mg of sodium a day (1 teaspoon).     Follow the DASH (Dietary Approaches to Stop Hypertension) eating plan. This plan recommends vegetables, fruits, whole gains, and other heart healthy foods.    Eat food rich in potassium.    Begin an exercise program. Ask your healthcare provider how to get started. The AHA recommends aerobic exercise 3 to 4 times a week for an average of 40 minutes at a time to lower blood pressure, with your provider's approval. Simple activities such as walking or gardening can help.    Break the smoking habit. Enroll in a stop-smoking program to improve your chances of success. Ask your healthcare provider about programs and medicines to help you stop smoking.    Limit drinks that contain caffeine such as coffee, black or green tea, and cola to 2 per day.    Never take stimulants such as amphetamines or cocaine. These drugs can be deadly for someone with high blood pressure.    Control your stress. Learn ways to manage stress.    Limit alcohol to no more than 1 drink a day for women and 2 drinks a day for men.    Follow-up care  Make a follow-up appointment as directed.  When to call your healthcare provider  Call your healthcare provider right away if you have any of the following:    Chest pain or shortness of breath ( call 911)    Moderate to severe headache    Weakness in the muscles of your face, arms, or legs    Trouble speaking    Extreme drowsiness    Confusion    Fainting or dizziness    Pulsating or rushing sound in your ears    Unexplained nosebleed    Weakness, tingling, or numbness of your face, arms, or legs    Change in vision    Blood  pressure measured at home that is greater than 180/110  [employer] last reviewed this educational content on 8/1/2019 2000-2021 [employer]. All rights reserved. This information is not intended as a substitute for professional medical care. Always follow your healthcare professional's instructions.

## 2021-09-17 ENCOUNTER — MYC MEDICAL ADVICE (OUTPATIENT)
Dept: FAMILY MEDICINE | Facility: CLINIC | Age: 56
End: 2021-09-17

## 2021-09-17 DIAGNOSIS — M50.30 DDD (DEGENERATIVE DISC DISEASE), CERVICAL: Primary | ICD-10-CM

## 2021-09-17 LAB
ALBUMIN SERPL-MCNC: 3.9 G/DL (ref 3.4–5)
ALP SERPL-CCNC: 51 U/L (ref 40–150)
ALT SERPL W P-5'-P-CCNC: 32 U/L (ref 0–70)
ANION GAP SERPL CALCULATED.3IONS-SCNC: 4 MMOL/L (ref 3–14)
AST SERPL W P-5'-P-CCNC: 41 U/L (ref 0–45)
BILIRUB SERPL-MCNC: 0.7 MG/DL (ref 0.2–1.3)
BUN SERPL-MCNC: 17 MG/DL (ref 7–30)
CALCIUM SERPL-MCNC: 8.5 MG/DL (ref 8.5–10.1)
CHLORIDE BLD-SCNC: 110 MMOL/L (ref 94–109)
CO2 SERPL-SCNC: 26 MMOL/L (ref 20–32)
CREAT SERPL-MCNC: 1.17 MG/DL (ref 0.66–1.25)
GFR SERPL CREATININE-BSD FRML MDRD: 70 ML/MIN/1.73M2
GLUCOSE BLD-MCNC: 99 MG/DL (ref 70–99)
POTASSIUM BLD-SCNC: 4.5 MMOL/L (ref 3.4–5.3)
PROT SERPL-MCNC: 7.1 G/DL (ref 6.8–8.8)
PSA SERPL-MCNC: 1.39 UG/L (ref 0–4)
SODIUM SERPL-SCNC: 140 MMOL/L (ref 133–144)
TSH SERPL DL<=0.005 MIU/L-ACNC: 1.56 MU/L (ref 0.4–4)

## 2021-09-17 NOTE — RESULT ENCOUNTER NOTE
You do have disc disease of your neck, which might be causing your arm and shoulder symptoms. You could follow up on this with an orthopedic specialist, or start physical therapy to see if this helps. Please let me know if you'd like a referral!    Sincerely,  Dr. Liat Bonilla MD  9/17/2021

## 2021-09-19 DIAGNOSIS — Z11.59 ENCOUNTER FOR SCREENING FOR OTHER VIRAL DISEASES: ICD-10-CM

## 2021-09-20 ENCOUNTER — HOSPITAL ENCOUNTER (OUTPATIENT)
Facility: CLINIC | Age: 56
End: 2021-09-20
Attending: INTERNAL MEDICINE | Admitting: INTERNAL MEDICINE
Payer: COMMERCIAL

## 2021-09-21 NOTE — RESULT ENCOUNTER NOTE
Hello!  It was a pleasure to see you in clinic!  Thank you for getting labs done. Everything looks normal, which is good news.    Your cbc, or complete blood count, which measures red blood cells (to check for anemia and other vitamin deficiencies) and white blood cells (to check for infection and leukemia) is normal, which is great!  Your platelets, which reflect liver function and ability to clot, are normal as well.     Your screening test was negative for Hepatitis C, which is great news! You have NOT been infected with this liver disease.  You do not need any further testing for Hepatitis C.     Your PSA (prostate cancer blood test)  is normal.     Your screening test was negative for Hepatitis C, which is great news! You have NOT been infected with this liver disease.  You do not need any further testing for Hepatitis C.     The testing of your blood sugar, kidney function, liver function and electrolytes was normal.     Keep up the good work!    Sincerely,  Dr. Liat Bonilla MD  9/21/2021

## 2021-09-22 DIAGNOSIS — Z11.59 ENCOUNTER FOR SCREENING FOR OTHER VIRAL DISEASES: ICD-10-CM

## 2021-10-04 ENCOUNTER — OFFICE VISIT (OUTPATIENT)
Dept: AUDIOLOGY | Facility: CLINIC | Age: 56
End: 2021-10-04
Payer: COMMERCIAL

## 2021-10-04 DIAGNOSIS — H93.13 TINNITUS, BILATERAL: ICD-10-CM

## 2021-10-04 DIAGNOSIS — H90.3 SENSORINEURAL HEARING LOSS (SNHL) OF BOTH EARS: Primary | ICD-10-CM

## 2021-10-04 PROCEDURE — 92550 TYMPANOMETRY & REFLEX THRESH: CPT | Performed by: AUDIOLOGIST

## 2021-10-04 PROCEDURE — 99207 PR NO CHARGE LOS: CPT | Performed by: AUDIOLOGIST

## 2021-10-04 PROCEDURE — 92557 COMPREHENSIVE HEARING TEST: CPT | Performed by: AUDIOLOGIST

## 2021-10-04 NOTE — PROGRESS NOTES
AUDIOLOGY REPORT    SUBJECTIVE:  Manjinder Esparza is a 56 year old male who was seen in the Audiology Clinic at the [facility] for audiologic evaluation, referred by Liat Bonilla M.D. The patient reports bilateral tinnitus and slight lightheadedness for approximately 8 weeks. The patient denies otalgia, aural fullness, and otorrhea.  The patient notes difficulty with communication in a variety of listening situations.      OBJECTIVE:  Abuse Screening:  Do you feel unsafe at home or work/school? No  Do you feel threatened by someone? No  Does anyone try to keep you from having contact with others, or doing things outside of your home? No  Physical signs of abuse present? No       Otoscopic exam indicates partial obstruction with cerumen bilaterally and ears are clear of cerumen bilaterally     Pure Tone Thresholds assessed using conventional audiometry with good  reliability from 250-8000 Hz bilaterally using insert and circumaural earphones     RIGHT:  normal sloping to moderate sensorineural hearing loss    LEFT:    normal sloping to moderate sensorineural hearing loss    Tympanogram:    RIGHT: normal eardrum mobility    LEFT:   normal eardrum mobility    Reflexes (reported by stimulus ear):  RIGHT: Ipsilateral is present at normal levels  RIGHT: Contralateral is present at normal levels  LEFT:   Ipsilateral is present at normal levels  LEFT:   Contralateral is present at normal levels      Speech Reception Threshold:    RIGHT: 10 dB HL    LEFT:   10 dB HL  Word Recognition Score:     RIGHT: 100% at 50 dB HL using NU-6 recorded word list.    LEFT:   100% at 50 dB HL using NU-6 recorded word list.      ASSESSMENT:     ICD-10-CM    1. Sensorineural hearing loss (SNHL) of both ears  H90.3 Cmprhn Audiometry Thrshld Eval & Speech Recog (74654)     Tymps / Reflex   ([id number])   2. Tinnitus, bilateral  H93.13 Adult Audiology Referral       Today s results were discussed with the patient in  detail.     PLAN:  Patient was counseled regarding hearing loss and impact on communication. Patient is a candidate for amplification at this time.  It is recommended that the patient be evaluated by ENT prior to discussing amplification options.  Please call this clinic with questions regarding these results or recommendations.        Gabriela Vargas.  Doctor of Audiology  [place] License # [id number]

## 2021-11-16 NOTE — TELEPHONE ENCOUNTER
FUTURE VISIT INFORMATION      FUTURE VISIT INFORMATION:    Date: 12/21/21    Time: 8:2 AM    Location: [facility]  REFERRAL INFORMATION:    Referring provider: Kate Vargas    Referring providers clinic: [facility] - Audiology    Reason for visit/diagnosis: Tinnitus    RECORDS REQUESTED FROM:       Clinic name Comments Records Status Imaging Status   [facility] 10/4/21 - AUD OV with Kate Vargas [facility] 9/16/21 - PCC OV with Dr. Burden  9/26/19 - NEURO OV with Dr. Romero [facility]    [facility] - [facility] 10/4/21 - Audiogram [facility]18 - CT Head [facility] PACs

## 2021-12-21 ENCOUNTER — PRE VISIT (OUTPATIENT)
Dept: OTOLARYNGOLOGY | Facility: CLINIC | Age: 56
End: 2021-12-21

## 2021-12-21 ENCOUNTER — OFFICE VISIT (OUTPATIENT)
Dept: OTOLARYNGOLOGY | Facility: CLINIC | Age: 56
End: 2021-12-21
Attending: FAMILY MEDICINE
Payer: COMMERCIAL

## 2021-12-21 VITALS — WEIGHT: 190 LBS | BODY MASS INDEX: 25.73 KG/M2 | HEIGHT: 72 IN

## 2021-12-21 DIAGNOSIS — R26.89 IMBALANCE: ICD-10-CM

## 2021-12-21 DIAGNOSIS — H93.13 TINNITUS, BILATERAL: ICD-10-CM

## 2021-12-21 DIAGNOSIS — H90.3 BILATERAL SENSORINEURAL HEARING LOSS: ICD-10-CM

## 2021-12-21 DIAGNOSIS — M26.609 TMJ (TEMPOROMANDIBULAR JOINT SYNDROME): ICD-10-CM

## 2021-12-21 DIAGNOSIS — H61.23 BILATERAL IMPACTED CERUMEN: Primary | ICD-10-CM

## 2021-12-21 PROCEDURE — 99205 OFFICE O/P NEW HI 60 MIN: CPT | Mod: 25 | Performed by: REGISTERED NURSE

## 2021-12-21 PROCEDURE — 92504 EAR MICROSCOPY EXAMINATION: CPT | Performed by: REGISTERED NURSE

## 2021-12-21 ASSESSMENT — MIFFLIN-ST. JEOR: SCORE: 1734.83

## 2021-12-21 ASSESSMENT — PAIN SCALES - GENERAL: PAINLEVEL: NO PAIN (0)

## 2021-12-21 NOTE — NURSING NOTE
Chief Complaint   Patient presents with     Consult     Tinnitus, Bilateral       Height 1.829 m (6'), weight 86.2 kg (190 lb).    Adriana Swanson, EMT

## 2021-12-21 NOTE — LETTER
Hearing Aid Medical Clearance    Manjinder NADEEM SanchezEsparza  December 21, 2021        This patient has received a medical examination and may be considered a suitable candidate for a hearing aid.         Provider:__________________________________________________                                        Zee Stroud DNP, JOHNNY, CNP.

## 2021-12-21 NOTE — PROGRESS NOTES
"  Otolaryngology Clinic  December 21, 2021    Chief Complaint:   Bilateral tinnitus      History of Present Illness:   Manjinder Esparza is a 56 year old male who presents today for evaluation of bilateral tinnitus.  Patient reports new onset of bilateral tinnitus starting in the spring 2021.  Reports tinnitus as a constant high-pitched ringing that is worse in quiet environments.  Does note a pulsing in ear when he lies down at night.  There is no pulsating when sitting up.  Patient does note a history of grinding and clenching with possible TMJ.    He has also noticed a gradual worsening in hearing.  Reports that, when watching TV, conversations seem jumbled.  Also has difficulty with softly spoken people and background noise.  Family history of hearing loss with both father and mother.  Also has a brother who is worn hearing aids his whole life.    Reports new onset of imbalance in the spring as well.  Reports that when he is tired he \"feels off\" and does not have good hand/eye coordination.  Will have intermittent dizziness when working out.  Patient does have a history of A. fib and hypertension with recent cardiac work-up that was normal.    Finally, he reports history of bilateral cerumen impaction requiring ear cleanings.  Cannot recall last time ears were cleaned.     Patient denies any otalgia, otorrhea, facial numbness/weakness, history of frequent ear infections, or ear surgeries.     Past Medical History:  Past Medical History:   Diagnosis Date     Atrial fibrillation (H)      Gastroesophageal reflux disease      Hypertension      Past Surgical History:  Past Surgical History:   Procedure Laterality Date     ANESTHESIA CARDIOVERSION N/A 6/29/2018    Procedure: ANESTHESIA CARDIOVERSION;  Cardioversion;  Surgeon: GENERIC ANESTHESIA PROVIDER;  Location: [facility] OR     SURGICAL HISTORY OF -       animal bone graft in the left lower jaw     Medications:  Current Outpatient Medications   Medication Sig Dispense " Refill     aspirin (ASA) 81 MG chewable tablet Take 81 mg by mouth daily       Glucosamine Sulfate 1000 MG TABS Take 1,500 mg by mouth       metoprolol succinate ER (TOPROL-XL) 50 MG 24 hr tablet TAKE 1 TABLET BY MOUTH DAILY. DO NOT CRUSH. MAY SPLIT IN HALF ALONG SCORE LINE 90 tablet 3     Allergies:  Allergies   Allergen Reactions     Lisinopril Cough      Social History:  Social History     Tobacco Use     Smoking status: Never Smoker     Smokeless tobacco: Never Used   Substance Use Topics     Alcohol use: Yes     Alcohol/week: 6.0 - 9.0 standard drinks     Types: 6 - 9 Cans of beer per week     Comment: 10-14 drinks a week      Drug use: No     ROS: 10 point ROS neg other than the symptoms noted above in the HPI.    Physical Exam:    Ht 1.829 m (6')   Wt 86.2 kg (190 lb)   BMI 25.77 kg/m       Constitutional:  The patient was unaccompanied, well-groomed, and in no acute distress.     Neurologic: Alert and oriented x 3.  CN's III-XII within normal limits.  Voice normal.    Psychiatric: The patient's affect was calm, cooperative, and appropriate.     Communication:  Normal; communicates verbally, normal voice quality.    Respiratory: Breathing comfortably without stridor or exertion of accessory muscles.    Ears: Pinnae and tragus non-tender.  EAC's and TM's were clear.        Otologic microscope exam:    Patient's ear pathology required use of the binocular microscope for the purpose of cleaning and improving visualization in order to assure a more accurate diagnostic evaluation.    Right ear was examined under the microscope.    Cerumen impaction noted.  It was cleaned with right angle hook and suction. Normal appearing TM, nicely aerated middle ear space.     Left ear was also examined under the microscope.  Cerumen impaction noted.  It was cleaned with right angle hook and suction. Normal appearing TM, nicely aerated middle ear space.       Audiogram: 10/4/2021- data independently reviewed  Normal sloping to  moderate rising to mild sensorineural hearing loss    % @ 50 dB  Acoustic Reflexes: present in all conditions  Tympanograms: type A bilaterally          Assessment and Plan:  1. Bilateral Sensorineural Hearing Loss  Reviewed audiogram with patient.  Audiogram shows bilateral sensorineural hearing loss.  Reviewed criteria of hearing aid candidacy with patient.  Patient is a hearing aid candidate and should consider amplification. Provided patient with copy of audiogram and letter of medical clearance.  Recommend that patient return to clinic in 1 year with audiogram if he does not pursue hearing aids at this time.  2. Tinnitus, bilateral  Patient with new onset of bilateral tinnitus.  Reviewed relationship of tinnitus and hearing loss with patient.  Patient's bilateral sensorineural hearing loss is likely contributing to tinnitus symptoms.  Discussed other factors that contribute to tinnitus including stress, anxiety, lack of sleep, and TMJ.  Also reviewed with patient tinnitus management strategies including tinnitus masking and mindfulness/cognitive behavioral strategies.    3. TMJ  Patient with tenderness of bilateral temporomandibular joints likely due to to history of grinding/clenching.  This may also be contributing to tinnitus symptoms.  Reviewed TMJ management including soft diet, warm compresses, NSAIDs, and awareness of clenching/grinding.  Patient will work on conservative management.  Offered TMJ referral.  Patient will contact clinic if needed.    4. Impacted cerumen, bilaterally  Patient with history of bilateral cerumen impaction.  Patient's ears were cleaned today under microscopy.  Recommend patient continue to monitor and schedule repeat cleaning in 1 year.    5. Imbalance  Patient with symptoms of imbalance/dizziness that do not appear to be related to a peripheral/ear cause.  Offered physical therapy referral for balance therapy.  Patient would like to continue to monitor and will contact  clinic if referral is needed.    Zee Stroud DNP, APRN, CNP  Otolaryngology  Head & Neck Surgery  [phone]    60 minutes spent on the date of the encounter doing chart review, history and exam, documentation and further activities per the note

## 2021-12-21 NOTE — LETTER
"12/21/2021       RE: Manjinder Esparza  [address] [address] [place] [address]     Dear Colleague,    Thank you for referring your patient, Manjinder Esparza, to the [facility] [place] at [facility]. Please see a copy of my visit note below.      Otolaryngology Clinic  December 21, 2021    Chief Complaint:   Bilateral tinnitus      History of Present Illness:   Manjinder Esparza is a 56 year old male who presents today for evaluation of bilateral tinnitus.  Patient reports new onset of bilateral tinnitus starting in the spring 2021.  Reports tinnitus as a constant high-pitched ringing that is worse in quiet environments.  Does note a pulsing in ear when he lies down at night.  There is no pulsating when sitting up.  Patient does note a history of grinding and clenching with possible TMJ.    He has also noticed a gradual worsening in hearing.  Reports that, when watching TV, conversations seem jumbled.  Also has difficulty with softly spoken people and background noise.  Family history of hearing loss with both father and mother.  Also has a brother who is worn hearing aids his whole life.    Reports new onset of imbalance in the spring as well.  Reports that when he is tired he \"feels off\" and does not have good hand/eye coordination.  Will have intermittent dizziness when working out.  Patient does have a history of A. fib and hypertension with recent cardiac work-up that was normal.    Finally, he reports history of bilateral cerumen impaction requiring ear cleanings.  Cannot recall last time ears were cleaned.     Patient denies any otalgia, otorrhea, facial numbness/weakness, history of frequent ear infections, or ear surgeries.     Past Medical History:  Past Medical History:   Diagnosis Date     Atrial fibrillation (H)      Gastroesophageal reflux disease      Hypertension      Past Surgical History:  Past Surgical History: "   Procedure Laterality Date     ANESTHESIA CARDIOVERSION N/A 6/29/2018    Procedure: ANESTHESIA CARDIOVERSION;  Cardioversion;  Surgeon: GENERIC ANESTHESIA PROVIDER;  Location: [facility] OR     SURGICAL HISTORY OF -       animal bone graft in the left lower jaw     Medications:  Current Outpatient Medications   Medication Sig Dispense Refill     aspirin (ASA) 81 MG chewable tablet Take 81 mg by mouth daily       Glucosamine Sulfate 1000 MG TABS Take 1,500 mg by mouth       metoprolol succinate ER (TOPROL-XL) 50 MG 24 hr tablet TAKE 1 TABLET BY MOUTH DAILY. DO NOT CRUSH. MAY SPLIT IN HALF ALONG SCORE LINE 90 tablet 3     Allergies:  Allergies   Allergen Reactions     Lisinopril Cough      Social History:  Social History     Tobacco Use     Smoking status: Never Smoker     Smokeless tobacco: Never Used   Substance Use Topics     Alcohol use: Yes     Alcohol/week: 6.0 - 9.0 standard drinks     Types: 6 - 9 Cans of beer per week     Comment: 10-14 drinks a week      Drug use: No     ROS: 10 point ROS neg other than the symptoms noted above in the HPI.    Physical Exam:    Ht 1.829 m (6')   Wt 86.2 kg (190 lb)   BMI 25.77 kg/m       Constitutional:  The patient was unaccompanied, well-groomed, and in no acute distress.     Neurologic: Alert and oriented x 3.  CN's III-XII within normal limits.  Voice normal.    Psychiatric: The patient's affect was calm, cooperative, and appropriate.     Communication:  Normal; communicates verbally, normal voice quality.    Respiratory: Breathing comfortably without stridor or exertion of accessory muscles.    Ears: Pinnae and tragus non-tender.  EAC's and TM's were clear.        Otologic microscope exam:    Patient's ear pathology required use of the binocular microscope for the purpose of cleaning and improving visualization in order to assure a more accurate diagnostic evaluation.    Right ear was examined under the microscope.    Cerumen impaction noted.  It was cleaned with right angle  hook and suction. Normal appearing TM, nicely aerated middle ear space.     Left ear was also examined under the microscope.  Cerumen impaction noted.  It was cleaned with right angle hook and suction. Normal appearing TM, nicely aerated middle ear space.       Audiogram: 10/4/2021- data independently reviewed  Normal sloping to moderate rising to mild sensorineural hearing loss    % @ 50 dB  Acoustic Reflexes: present in all conditions  Tympanograms: type A bilaterally          Assessment and Plan:  1. Bilateral Sensorineural Hearing Loss  Reviewed audiogram with patient.  Audiogram shows bilateral sensorineural hearing loss.  Reviewed criteria of hearing aid candidacy with patient.  Patient is a hearing aid candidate and should consider amplification. Provided patient with copy of audiogram and letter of medical clearance.  Recommend that patient return to clinic in 1 year with audiogram if he does not pursue hearing aids at this time.  2. Tinnitus, bilateral  Patient with new onset of bilateral tinnitus.  Reviewed relationship of tinnitus and hearing loss with patient.  Patient's bilateral sensorineural hearing loss is likely contributing to tinnitus symptoms.  Discussed other factors that contribute to tinnitus including stress, anxiety, lack of sleep, and TMJ.  Also reviewed with patient tinnitus management strategies including tinnitus masking and mindfulness/cognitive behavioral strategies.    3. TMJ  Patient with tenderness of bilateral temporomandibular joints likely due to to history of grinding/clenching.  This may also be contributing to tinnitus symptoms.  Reviewed TMJ management including soft diet, warm compresses, NSAIDs, and awareness of clenching/grinding.  Patient will work on conservative management.  Offered TMJ referral.  Patient will contact clinic if needed.    4. Impacted cerumen, bilaterally  Patient with history of bilateral cerumen impaction.  Patient's ears were cleaned today under  microscopy.  Recommend patient continue to monitor and schedule repeat cleaning in 1 year.    5. Imbalance  Patient with symptoms of imbalance/dizziness that do not appear to be related to a peripheral/ear cause.  Offered physical therapy referral for balance therapy.  Patient would like to continue to monitor and will contact clinic if referral is needed.    Zee Stroud DNP, APRN, CNP  Otolaryngology  Head & Neck Surgery  [phone]    60 minutes spent on the date of the encounter doing chart review, history and exam, documentation and further activities per the note      Again, thank you for allowing me to participate in the care of your patient.      Sincerely,    Viky Stroud, NP

## 2022-02-13 DIAGNOSIS — Z11.59 ENCOUNTER FOR SCREENING FOR OTHER VIRAL DISEASES: Primary | ICD-10-CM

## 2022-03-10 ENCOUNTER — LAB (OUTPATIENT)
Dept: LAB | Facility: CLINIC | Age: 57
End: 2022-03-10
Attending: SURGERY
Payer: COMMERCIAL

## 2022-03-10 DIAGNOSIS — Z11.59 ENCOUNTER FOR SCREENING FOR OTHER VIRAL DISEASES: ICD-10-CM

## 2022-03-10 PROCEDURE — U0005 INFEC AGEN DETEC AMPLI PROBE: HCPCS

## 2022-03-10 PROCEDURE — U0003 INFECTIOUS AGENT DETECTION BY NUCLEIC ACID (DNA OR RNA); SEVERE ACUTE RESPIRATORY SYNDROME CORONAVIRUS 2 (SARS-COV-2) (CORONAVIRUS DISEASE [COVID-19]), AMPLIFIED PROBE TECHNIQUE, MAKING USE OF HIGH THROUGHPUT TECHNOLOGIES AS DESCRIBED BY CMS-2020-01-R: HCPCS

## 2022-03-11 ENCOUNTER — HOSPITAL ENCOUNTER (OUTPATIENT)
Facility: CLINIC | Age: 57
Discharge: HOME | End: 2022-03-11
Attending: SURGERY | Admitting: SURGERY
Payer: COMMERCIAL

## 2022-03-11 VITALS
HEIGHT: 72 IN | SYSTOLIC BLOOD PRESSURE: 118 MMHG | WEIGHT: 198 LBS | RESPIRATION RATE: 16 BRPM | BODY MASS INDEX: 26.82 KG/M2 | TEMPERATURE: 97.4 F | HEART RATE: 59 BPM | OXYGEN SATURATION: 96 % | DIASTOLIC BLOOD PRESSURE: 89 MMHG

## 2022-03-11 LAB
COLONOSCOPY: NORMAL
SARS-COV-2 RNA RESP QL NAA+PROBE: NEGATIVE

## 2022-03-11 PROCEDURE — G0105 COLORECTAL SCRN; HI RISK IND: HCPCS | Performed by: SURGERY

## 2022-03-11 PROCEDURE — G0500 MOD SEDAT ENDO SERVICE >5YRS: HCPCS | Performed by: SURGERY

## 2022-03-11 PROCEDURE — G0105 COLORECTAL SCRN; HI RISK IND: HCPCS | Mod: 33 | Performed by: SURGERY

## 2022-03-11 PROCEDURE — G0500 MOD SEDAT ENDO SERVICE >5YRS: HCPCS | Mod: 33 | Performed by: SURGERY

## 2022-03-11 PROCEDURE — 45378 DIAGNOSTIC COLONOSCOPY: CPT | Performed by: SURGERY

## 2022-03-11 PROCEDURE — 250N000011 HC RX IP 250 OP 636: Performed by: SURGERY

## 2022-03-11 RX ORDER — ATROPINE SULFATE 0.4 MG/ML
0.4 AMPUL (ML) INJECTION
Status: DISCONTINUED | OUTPATIENT
Start: 2022-03-11 | End: 2022-03-11 | Stop reason: HOSPADM

## 2022-03-11 RX ORDER — ONDANSETRON 2 MG/ML
4 INJECTION INTRAMUSCULAR; INTRAVENOUS
Status: DISCONTINUED | OUTPATIENT
Start: 2022-03-11 | End: 2022-03-11 | Stop reason: HOSPADM

## 2022-03-11 RX ORDER — NALOXONE HYDROCHLORIDE 0.4 MG/ML
0.4 INJECTION, SOLUTION INTRAMUSCULAR; INTRAVENOUS; SUBCUTANEOUS
Status: DISCONTINUED | OUTPATIENT
Start: 2022-03-11 | End: 2022-03-11 | Stop reason: HOSPADM

## 2022-03-11 RX ORDER — ONDANSETRON 4 MG/1
4 TABLET, ORALLY DISINTEGRATING ORAL EVERY 6 HOURS PRN
Status: DISCONTINUED | OUTPATIENT
Start: 2022-03-11 | End: 2022-03-11 | Stop reason: HOSPADM

## 2022-03-11 RX ORDER — LOSARTAN POTASSIUM 25 MG/1
25 TABLET ORAL DAILY
COMMUNITY
End: 2023-11-06

## 2022-03-11 RX ORDER — PROCHLORPERAZINE MALEATE 10 MG
10 TABLET ORAL EVERY 6 HOURS PRN
Status: DISCONTINUED | OUTPATIENT
Start: 2022-03-11 | End: 2022-03-11 | Stop reason: HOSPADM

## 2022-03-11 RX ORDER — FENTANYL CITRATE 0.05 MG/ML
50-100 INJECTION, SOLUTION INTRAMUSCULAR; INTRAVENOUS
Status: COMPLETED | OUTPATIENT
Start: 2022-03-11 | End: 2022-03-11

## 2022-03-11 RX ORDER — NALOXONE HYDROCHLORIDE 0.4 MG/ML
0.2 INJECTION, SOLUTION INTRAMUSCULAR; INTRAVENOUS; SUBCUTANEOUS
Status: DISCONTINUED | OUTPATIENT
Start: 2022-03-11 | End: 2022-03-11 | Stop reason: HOSPADM

## 2022-03-11 RX ORDER — FENTANYL CITRATE 0.05 MG/ML
25-50 INJECTION, SOLUTION INTRAMUSCULAR; INTRAVENOUS
Status: DISCONTINUED | OUTPATIENT
Start: 2022-03-11 | End: 2022-03-11 | Stop reason: HOSPADM

## 2022-03-11 RX ORDER — FLUMAZENIL 0.1 MG/ML
0.2 INJECTION, SOLUTION INTRAVENOUS
Status: DISCONTINUED | OUTPATIENT
Start: 2022-03-11 | End: 2022-03-11 | Stop reason: HOSPADM

## 2022-03-11 RX ORDER — SIMETHICONE 40MG/0.6ML
133 SUSPENSION, DROPS(FINAL DOSAGE FORM)(ML) ORAL
Status: DISCONTINUED | OUTPATIENT
Start: 2022-03-11 | End: 2022-03-11 | Stop reason: HOSPADM

## 2022-03-11 RX ORDER — EPINEPHRINE 1 MG/ML
0.1 INJECTION, SOLUTION INTRAMUSCULAR; SUBCUTANEOUS
Status: DISCONTINUED | OUTPATIENT
Start: 2022-03-11 | End: 2022-03-11 | Stop reason: HOSPADM

## 2022-03-11 RX ORDER — ONDANSETRON 2 MG/ML
4 INJECTION INTRAMUSCULAR; INTRAVENOUS EVERY 6 HOURS PRN
Status: DISCONTINUED | OUTPATIENT
Start: 2022-03-11 | End: 2022-03-11 | Stop reason: HOSPADM

## 2022-03-11 RX ORDER — LIDOCAINE 40 MG/G
CREAM TOPICAL
Status: DISCONTINUED | OUTPATIENT
Start: 2022-03-11 | End: 2022-03-11 | Stop reason: HOSPADM

## 2022-03-11 RX ADMIN — FENTANYL CITRATE 100 MCG: 50 INJECTION INTRAMUSCULAR; INTRAVENOUS at 09:12

## 2022-03-11 RX ADMIN — MIDAZOLAM 2 MG: 1 INJECTION INTRAMUSCULAR; INTRAVENOUS at 09:12

## 2022-03-11 NOTE — H&P
Pre-Endoscopy History and Physical     Manjinder Esparza MRN# [id number]   YOB: 1965 Age: 56 year old     Date of Procedure: 3/11/2022  Primary care provider: Liat Staples (Inactive)  Type of Endoscopy: colonoscopy  Reason for Procedure: FH colon cancer in mother >59yo  Type of Anesthesia Anticipated: Moderate Sedation    HPI:    Manjinder is a 56 year old male who will be undergoing the above procedure.    Polyps on last colonoscopy were all hyperplastic on final path    A history and physical has been performed. The patient's medications and allergies have been reviewed. The risks and benefits of the procedure and the sedation options and risks were discussed with the patient.  All questions were answered and informed consent was obtained.      He denies a personal or family history of anesthesia complications or bleeding disorders.     Allergies   Allergen Reactions     Lisinopril Cough        Prior to Admission Medications   Prescriptions Last Dose Informant Patient Reported? Taking?   aspirin (ASA) 81 MG chewable tablet   Yes Yes   Sig: Take 81 mg by mouth daily   metoprolol succinate ER (TOPROL-XL) 50 MG 24 hr tablet   No Yes   Sig: TAKE 1 TABLET BY MOUTH DAILY. DO NOT CRUSH. MAY SPLIT IN HALF ALONG SCORE LINE      Facility-Administered Medications: None       Patient Active Problem List   Diagnosis     CARDIOVASCULAR SCREENING; LDL GOAL LESS THAN 160     Benign essential hypertension     Paroxysmal A-fib (H)     Pulmonary nodule, 2mm, RML     Benign lipomatous neoplasm of skin and subcutaneous tissue of trunk     CAIO (obstructive sleep apnea)     Dizziness     Numbness and tingling in left arm     Heavy alcohol use        Past Medical History:   Diagnosis Date     Atrial fibrillation (H)      Gastroesophageal reflux disease      Hypertension         Past Surgical History:   Procedure Laterality Date     ANESTHESIA CARDIOVERSION N/A 6/29/2018    Procedure: ANESTHESIA CARDIOVERSION;   Cardioversion;  Surgeon: GENERIC ANESTHESIA PROVIDER;  Location: [facility] OR     COLONOSCOPY       SURGICAL HISTORY OF -       animal bone graft in the left lower jaw       Social History     Tobacco Use     Smoking status: Never Smoker     Smokeless tobacco: Never Used   Substance Use Topics     Alcohol use: Yes     Alcohol/week: 6.0 - 9.0 standard drinks     Types: 6 - 9 Cans of beer per week     Comment: 10-14 drinks a week        Family History   Problem Relation Age of Onset     Diabetes Mother         type 2     Hypertension Mother      Cancer - colorectal Mother 65     Circulatory Mother         varicose veins     Cataracts Mother         cataracts     Atrial fibrillation Mother      Hypertension Father      Prostate Cancer Father      Arthritis Father         in his knees     Lipids Father      Lipids Brother      Prostate Cancer Brother      Eye Disorder Maternal Grandmother         cataracts     Cancer Maternal Grandmother      Cancer Maternal Grandfather         lung     Cerebrovascular Disease Paternal Grandmother      Allergies Other         self, developing allergies      Gastrointestinal Disease Other         self, may have ibs       REVIEW OF SYSTEMS:     5 point ROS negative except as noted above in HPI, including Gen., Resp., CV, GI &  system review.      PHYSICAL EXAM:   There were no vitals taken for this visit. Estimated body mass index is 25.77 kg/m  as calculated from the following:    Height as of 12/21/21: 1.829 m (6').    Weight as of 12/21/21: 86.2 kg (190 lb).   GENERAL APPEARANCE: healthy, alert and no distress  MENTAL STATUS: alert  AIRWAY EXAM: Mask on  RESP: lungs clear to auscultation - no rales, rhonchi or wheezes  CV: regular rates and rhythm      DIAGNOSTICS:    Not indicated      IMPRESSION   ASA Class 2 - Mild systemic disease        PLAN:       Plan for colonoscopy. We discussed the risks, benefits and alternatives and the patient wished to proceed.    The above has been forwarded  to the consulting provider.      Signed Electronically by: Josh Villalobos MD  March 11, 2022

## 2022-05-28 NOTE — PROGRESS NOTES
Manjinder Esparza is a 56 year old who is being evaluated via a billable video visit.      How would you like to obtain your AVS? MyChart  If the video visit is dropped, the invitation should be resent by: Text to cell phone: [phone]  Will anyone else be joining your video visit? No      Vitals - Patient Reported  Weight (Patient Reported): 87.1 kg (192 lb)  Pain Score: Mild Pain (2) (No SOB)    Vitals were taken and medications reconciled.    Khris Nunez, EMT  4:27 PM    HPI:     54 year old male with a history of hypertension and atrial fibrillation.  His atrial fibrillation appears to be infrequent and he is no longer anticoagulated given his age and absence of underlying structural heart disease.      He has  been complaining of some chest discomfort at rest which he ranks is a 2-3 out of 10 and which disappears or improves with working out.  Mr. Esparza is frequently doing push-ups (up to 100/day) and does note pain over the left pectoralis major muscle. He is certain it is muscular related and  points to a very localized region left of the sternum.  Why the pain disappears with physical exertion is unclear.      We recently had a Lexiscan which was normal.  He does have imaging which shows normal heart size and function with some evidence for calcification in the aorta for which he was very nervous.  We discussed this and I reassured him.    Mr. Esparza does have some complaints of fatigue during the day and believes that metoprolol is at fault.  We agreed that he will cut his metoprolol in half (25 mg daily) and if that does not improve then we may switch him to diltiazem in the near future.  I asked him to contact us after he has made the reduction in the metoprolol dose and try to double it for a couple weeks.    Recent laboratory data is summarized briefly below    ECG 7/27/21: WNL    Lexiscan undertaken on August 9, 2021  The nuclear stress test is negative for inducible myocardial ischemia or  infarction.     LVEDv 72ml. LVESv 20ml. LV EF 72%.     There is no prior study for comparison.    PAST MEDICAL HISTORY:  Past Medical History:   Diagnosis Date     Atrial fibrillation (H)      Gastroesophageal reflux disease      Hypertension        CURRENT MEDICATIONS:  Current Outpatient Medications   Medication Sig Dispense Refill     aspirin (ASA) 81 MG chewable tablet Take 81 mg by mouth daily       Glucosamine Sulfate 1000 MG TABS Take 1,500 mg by mouth       metoprolol succinate ER (TOPROL-XL) 50 MG 24 hr tablet TAKE 1 TABLET BY MOUTH DAILY. DO NOT CRUSH. MAY SPLIT IN HALF ALONG SCORE LINE 90 tablet 3     ranitidine (ZANTAC) 150 MG tablet Take 1 tablet (150 mg) by mouth 2 times daily (Patient not taking: Reported on 9/26/2019)         PAST SURGICAL HISTORY:  Past Surgical History:   Procedure Laterality Date     ANESTHESIA CARDIOVERSION N/A 6/29/2018    Procedure: ANESTHESIA CARDIOVERSION;  Cardioversion;  Surgeon: GENERIC ANESTHESIA PROVIDER;  Location: [facility] OR     SURGICAL HISTORY OF -       animal bone graft in the left lower jaw       ALLERGIES:     Allergies   Allergen Reactions     Lisinopril      cough       FAMILY HISTORY:  Family History   Problem Relation Age of Onset     Diabetes Mother         type 2     Hypertension Mother      Cancer - colorectal Mother      Circulatory Mother         varicose veins     Eye Disorder Mother         cataracts     Hypertension Father      Prostate Cancer Father      Arthritis Father         in his knees     Lipids Father      Eye Disorder Maternal Grandmother         cataracts     Cancer Maternal Grandmother      Cancer Maternal Grandfather         lung     Cerebrovascular Disease Paternal Grandmother      Allergies Other         self, developing allergies      Gastrointestinal Disease Other         self, may have ibs     Lipids Brother      Prostate Cancer Brother        SOCIAL HISTORY:  Social History     Tobacco Use     Smoking status: Never Smoker     Smokeless  tobacco: Never Used   Substance Use Topics     Alcohol use: Yes     Alcohol/week: 6.0 - 9.0 standard drinks     Types: 6 - 9 Cans of beer per week     Comment: 10-14 drinks a week      Drug use: No       ROS:   Constitutional: No fever, chills, or sweats. Weight stable.   ENT: No visual disturbance,.   Cardiovascular: As per HPI.   Respiratory: No cough, hemoptysis.    GI: No nausea, vomiting, hematemesis, melena, or hematochezia.   : No hematuria.   Integument: Negative.   Psychiatric: Negative.   Hematologic:  Easy bruising, no easy bleeding.  Neuro: Negative.   Endocrinology: No significant heat or cold intolerance   Musculoskeletal: Chest pain as described in HPI.    Exam:  There were no vitals taken for this visit.  GENERAL APPEARANCE: healthy, alert and no distress  HEENT: no icterus, no central cyanosis  NECK:JVP not elevated  RESPIRATORY:no rales, rhonchi or wheezes, no use of accessory muscles, no retractions, respirations are unlabored, normal respiratory rate  NEURO: alert and oriented to person/place/time, normal speech,and affect  SKIN: no ecchymoses, no rashes    Labs:  CBC RESULTS:   Lab Results   Component Value Date    WBC 6.9 10/01/2018    RBC 4.54 10/01/2018    HGB 14.2 10/01/2018    HCT 40.7 10/01/2018    MCV 90 10/01/2018    MCH 31.3 10/01/2018    MCHC 34.9 10/01/2018    RDW 12.7 10/01/2018     10/01/2018       BMP RESULTS:  Lab Results   Component Value Date     08/09/2021     09/09/2020    POTASSIUM 3.7 08/09/2021    POTASSIUM 4.1 09/09/2020    CHLORIDE 112 (H) 08/09/2021    CHLORIDE 108 09/09/2020    CO2 26 08/09/2021    CO2 23 09/09/2020    ANIONGAP 5 08/09/2021    ANIONGAP 8 09/09/2020     (H) 08/09/2021    GLC 94 09/09/2020    BUN 15 08/09/2021    BUN 21 09/09/2020    CR 1.10 08/09/2021    CR 1.17 09/09/2020    GFRESTIMATED 75 08/09/2021    GFRESTIMATED 70 09/09/2020    GFRESTBLACK 81 09/09/2020    JESUS 8.8 08/09/2021    JESUS 8.7 09/09/2020        INR  RESULTS:  Lab Results   Component Value Date    INR 1.18 (H) 07/16/2018    INR 1.16 (H) 06/29/2018       Procedures:  PULMONARY FUNCTION TESTS:   No flowsheet data found.      ECHOCARDIOGRAM:   See Lexiscan data above    Assessment and Plan:  1.  Infrequent atrial fibrillation-we did review the fact that he may require anticoagulation later in life based on [id number]  2.  Chest pain which is almost certainly musculoskeletal in nature-worse at rest and improved with physical activity.  Not cardiac in origin but why a musculoskeletal pain improves with activity is unclear  3.  Imaging showing aortic calcification present    Plan  1.  Reassurance regarding cardiac and A. fib status at present  2.  Follow-up in 1 years time but call if symptoms are not improved (see #3 below)  3.  Possible fatigue related to metoprolol dosing.  Should the patient not find benefit by reducing the dose in half we may wish to switch him to diltiazem    Total elapsed time today with chart review, face-to-face and documentation 45 minutes    Video on 5: 00 p.m.-off 5: 20 p.m.   platform [employer]  Patient at home; clinic [facility] heart        CC  SELF, REFERRED     Attending with

## 2022-06-27 DIAGNOSIS — I47.10 PAROXYSMAL SUPRAVENTRICULAR TACHYCARDIA (H): ICD-10-CM

## 2022-06-27 DIAGNOSIS — I10 HYPERTENSION, UNSPECIFIED TYPE: ICD-10-CM

## 2022-06-29 RX ORDER — METOPROLOL SUCCINATE 50 MG/1
50 TABLET, EXTENDED RELEASE ORAL DAILY
Qty: 90 TABLET | Refills: 0 | Status: SHIPPED | OUTPATIENT
Start: 2022-06-29 | End: 2022-10-20

## 2022-08-17 ENCOUNTER — OFFICE VISIT (OUTPATIENT)
Dept: URGENT CARE | Facility: URGENT CARE | Age: 57
End: 2022-08-17
Payer: COMMERCIAL

## 2022-08-17 VITALS
SYSTOLIC BLOOD PRESSURE: 134 MMHG | OXYGEN SATURATION: 98 % | DIASTOLIC BLOOD PRESSURE: 84 MMHG | HEART RATE: 78 BPM | RESPIRATION RATE: 16 BRPM | TEMPERATURE: 98 F

## 2022-08-17 DIAGNOSIS — R21 RASH AND NONSPECIFIC SKIN ERUPTION: Primary | ICD-10-CM

## 2022-08-17 PROCEDURE — 87593 ORTHOPOXVIRUS AMP PRB EACH: CPT | Performed by: PHYSICIAN ASSISTANT

## 2022-08-17 PROCEDURE — 99213 OFFICE O/P EST LOW 20 MIN: CPT | Performed by: PHYSICIAN ASSISTANT

## 2022-08-17 RX ORDER — SULFAMETHOXAZOLE/TRIMETHOPRIM 800-160 MG
1 TABLET ORAL 2 TIMES DAILY
Qty: 14 TABLET | Refills: 0 | Status: SHIPPED | OUTPATIENT
Start: 2022-08-17 | End: 2022-09-30

## 2022-08-17 NOTE — PROGRESS NOTES
Assessment & Plan     1. Rash and nonspecific skin eruption  Does not appear to be swollen lymphnodes  Highest on my differential is folliculitis, as he spent time in a hot tub prior to the eruption  Less likely monkeypox, but swab was obtained today  Does not appear to be insect bites or lymph nodes swelling  OK to use OTC HTC on the rash for several day for swelling  - Orthopoxvirus (includes monkeypox) by PCR; Future  - Orthopoxvirus (includes monkeypox) by PCR  - sulfamethoxazole-trimethoprim (BACTRIM DS) 800-160 MG tablet; Take 1 tablet by mouth 2 times daily  Dispense: 14 tablet; Refill: 0      Diagnosis and treatment plan was reviewed with patient and/or family.   We went over any labs or imaging. Discussed worsening symptoms or little to no relief despite treatment plan to follow-up with PCP or return to clinic.  Patient verbalizes understanding. All questions were addressed and answered.     Libra Junior PA-C  [facility]    CHIEF COMPLAINT:   Chief Complaint   Patient presents with     Mass     Swollen lymph nodes under armpits and on chest X 2 days ago. Painful to put arms down.      Andreia Dunbar is a 57 year old male who presents to clinic today for evaluation of rash. First noticed the rash several days ago, but has progressed. Rash is tender. Not itchy. Spent time this weekend in a hot tub. No fever or chills.       Past Medical History:   Diagnosis Date     Atrial fibrillation (H)      Gastroesophageal reflux disease      Hypertension      Past Surgical History:   Procedure Laterality Date     ANESTHESIA CARDIOVERSION N/A 6/29/2018    Procedure: ANESTHESIA CARDIOVERSION;  Cardioversion;  Surgeon: GENERIC ANESTHESIA PROVIDER;  Location: [facility] OR     COLONOSCOPY       COLONOSCOPY N/A 3/11/2022    Procedure: COLONOSCOPY;  Surgeon: Josh Villalobos MD;  Location:  GI     SURGICAL HISTORY OF -       animal bone graft in the left lower jaw     Social History      Tobacco Use     Smoking status: Never Smoker     Smokeless tobacco: Never Used   Substance Use Topics     Alcohol use: Yes     Alcohol/week: 6.0 - 9.0 standard drinks     Types: 6 - 9 Cans of beer per week     Comment: 10-14 drinks a week      Current Outpatient Medications   Medication     aspirin (ASA) 81 MG chewable tablet     losartan (COZAAR) 25 MG tablet     metoprolol succinate ER (TOPROL XL) 50 MG 24 hr tablet     No current facility-administered medications for this visit.     Allergies   Allergen Reactions     Lisinopril Cough       10 point ROS of systems were all negative except for pertinent positives noted in my HPI.      Exam:   /84   Pulse 78   Temp 98  F (36.7  C) (Tympanic)   Resp 16   SpO2 98%   Constitutional: healthy, alert and no distress  Head: Normocephalic, atraumatic.  Eyes: conjunctiva clear, no drainage  ENT: Throat clear. MMM.  Neck: neck is supple, no cervical lymphadenopathy or nuchal rigidity  Cardiovascular: RRR  Respiratory: CTA bilaterally, no rhonchi or rales  Skin: Erythematous macules noted on axilla B/L and several on chest   Neurologic: Speech clear, gait normal. Moves all extremities.

## 2022-08-19 LAB — ORTHOPOXVIRUS DNA SPEC QL NAA+PROBE: NOT DETECTED

## 2022-09-17 ENCOUNTER — NURSE TRIAGE (OUTPATIENT)
Dept: NURSING | Facility: CLINIC | Age: 57
End: 2022-09-17

## 2022-09-17 NOTE — TELEPHONE ENCOUNTER
"Nurse Triage SBAR        Is this a 2nd Level Triage? No    Situation/Background: Patient is calling to report that he was moving something in his garage today when his bicep \"popped\" as he was moving something in the garage. The bicep is \"curled up in a ball\" in patient's upper arm.     Assessment:   Patient states he can lift and move arm without a lot of pain at time of call  Patient does not think he can put pressure on the arm without pain  Bicep appears to be rolled up in a ball up toward shoulder  Denies pain in R elbow or shoulder  Denies swelling of R elbow or shoulder  May have swelling started in R upper arm at time of call    Recommendation: Per disposition, See PCP within 3 days. Go to  if unable to be seen in clinic. Home care advice provided. Advised patient to call back with any new or worsening symptoms. Patient verbalized understanding and agrees with plan.    Protocol Recommended Disposition: Routine office follow-up appointment     Jane Spann RN on 9/17/2022 at 3:41 PM  [facility]    Reason for Disposition    Biceps muscle tear suspected (new bulge in upper arm area of biceps muscle, felt a pop)    Additional Information    Negative: Serious injury with multiple fractures (broken bones)    Negative: [1] Major bleeding (e.g., actively dripping or spurting) AND [2] can't be stopped    Negative: Sounds like a life-threatening emergency to the triager    Negative: Wound looks infected    Negative: Arm pain from overuse (e.g., sports, lifting, physical work)    Negative: Arm pain not from an injury    Negative: Shoulder injury is main concern    Negative: Elbow injury is main concern    Negative: Wrist or hand injury is main concern    Negative: Finger injury is main concern    Negative: Bullet wound, stabbed by knife, or other serious penetrating wound    Negative: Looks like a broken bone (crooked or deformed)    Negative: Looks like a dislocated joint    Negative: Can't " move injured arm at all    Negative: [1] Bleeding AND [2] won't stop after 10 minutes of direct pressure (using correct technique)    Negative: Skin is split open or gaping (or length > 1/2 inch or 12 mm)    Negative: [1] Dirt in the wound AND [2] not removed with 15 minutes of scrubbing    Negative: Sounds like a serious injury to the triager    Negative: [1] SEVERE pain AND [2] not improved 2 hours after pain medicine/ice packs    Negative: [1] Large swelling or bruise around joint (wrist, elbow, shoulder) AND [2] can't move injured arm normally (bend or straighten completely)    Negative: [1] After day 2 AND [2] pain at site of injury becomes worse AND [3] unexplained fever occurs    Negative: Suspicious history for the injury    Negative: Can't move injured arm normally (bend or straighten completely)     Painful when straightening    Negative: Large swelling or bruise (> 2 inches or 5 cm)    Negative: [1] High-risk adult (e.g., age > 60 years, osteoporosis, chronic steroid use) AND [2] still hurts    Negative: [1] No prior tetanus shots (or is not fully vaccinated) AND [2] any wound (e.g., cut or scrape)    Negative: [1] HIV positive or severe immunodeficiency (severely weak immune system) AND [2] DIRTY cut or scrape    Negative: [1] Last tetanus shot > 5 years ago AND [2] DIRTY cut or scrape    Negative: [1] Last tetanus shot > 10 years ago AND [2] CLEAN cut or scrape (e.g., object AND skin were clean)    Protocols used: ARM INJURY-A-

## 2022-09-30 ENCOUNTER — OFFICE VISIT (OUTPATIENT)
Dept: FAMILY MEDICINE | Facility: CLINIC | Age: 57
End: 2022-09-30
Payer: COMMERCIAL

## 2022-09-30 VITALS
SYSTOLIC BLOOD PRESSURE: 145 MMHG | DIASTOLIC BLOOD PRESSURE: 100 MMHG | RESPIRATION RATE: 16 BRPM | WEIGHT: 197 LBS | BODY MASS INDEX: 26.72 KG/M2 | OXYGEN SATURATION: 100 % | TEMPERATURE: 97 F | HEART RATE: 65 BPM

## 2022-09-30 DIAGNOSIS — Z23 NEED FOR PROPHYLACTIC VACCINATION AND INOCULATION AGAINST INFLUENZA: ICD-10-CM

## 2022-09-30 DIAGNOSIS — Z01.818 PREOP GENERAL PHYSICAL EXAM: Primary | ICD-10-CM

## 2022-09-30 DIAGNOSIS — F10.90 HEAVY ALCOHOL USE: ICD-10-CM

## 2022-09-30 DIAGNOSIS — I10 BENIGN ESSENTIAL HYPERTENSION: ICD-10-CM

## 2022-09-30 DIAGNOSIS — I48.91 ATRIAL FIBRILLATION, UNSPECIFIED TYPE (H): ICD-10-CM

## 2022-09-30 DIAGNOSIS — G47.33 OSA (OBSTRUCTIVE SLEEP APNEA): ICD-10-CM

## 2022-09-30 DIAGNOSIS — S46.211D RUPTURE OF RIGHT BICEPS TENDON, SUBSEQUENT ENCOUNTER: ICD-10-CM

## 2022-09-30 PROCEDURE — 91312 COVID-19,PF,PFIZER BOOSTER BIVALENT: CPT | Performed by: NURSE PRACTITIONER

## 2022-09-30 PROCEDURE — 90471 IMMUNIZATION ADMIN: CPT | Performed by: NURSE PRACTITIONER

## 2022-09-30 PROCEDURE — 99214 OFFICE O/P EST MOD 30 MIN: CPT | Mod: 25 | Performed by: NURSE PRACTITIONER

## 2022-09-30 PROCEDURE — 90686 IIV4 VACC NO PRSV 0.5 ML IM: CPT | Performed by: NURSE PRACTITIONER

## 2022-09-30 PROCEDURE — 0124A COVID-19,PF,PFIZER BOOSTER BIVALENT: CPT | Performed by: NURSE PRACTITIONER

## 2022-09-30 NOTE — PATIENT INSTRUCTIONS
Preparing for Your Surgery  Getting started  A nurse will call you to review your health history and instructions. They will give you an arrival time based on your scheduled surgery time. Please be ready to share:    Your doctor's clinic name and phone number    Your medical, surgical and anesthesia history    A list of allergies and sensitivities    A list of medicines, including herbal treatments and over-the-counter drugs    Whether the patient has a legal guardian (ask how to send us the papers in advance)  Please tell us if you're pregnant--or if there's any chance you might be pregnant. Some surgeries may injure a fetus (unborn baby), so they require a pregnancy test. Surgeries that are safe for a fetus don't always need a test, and you can choose whether to have one.   If you have a child who's having surgery, please ask for a copy of Preparing for Your Child's Surgery.    Preparing for surgery    Within 10 to 30 days of surgery: Have a pre-op exam (sometimes called an H&P, or History and Physical). This can be done at a clinic or pre-operative center.  ? If you're having a , you may not need this exam. Talk to your care team.    At your pre-op exam, talk to your care team about all medicines you take. If you need to stop any medicines before surgery, ask when to start taking them again.  ? We do this for your safety. Many medicines can make you bleed too much during surgery. Some change how well surgery (anesthesia) drugs work.    Call your insurance company to let them know you're having surgery. (If you don't have insurance, call [phone].)    Call your clinic if there's any change in your health. This includes signs of a cold or flu (sore throat, runny nose, cough, rash, fever). It also includes a scrape or scratch near the surgery site.    If you have questions on the day of surgery, call your hospital or surgery center.  COVID testing  You may need to be tested for COVID-19 before having  surgery. If so, we will give you instructions (or click here).  Eating and drinking guidelines  For your safety: Unless your surgeon tells you otherwise, follow the guidelines below.    Eat and drink as usual until 8 hours before surgery. After that, no food or milk.    Drink clear liquids until 2 hours before surgery. These are liquids you can see through, like water, Gatorade and Propel Water. You may also have black coffee and tea (no cream or milk).    Nothing by mouth within 2 hours of surgery. This includes gum, candy and breath mints.    If you drink alcohol: Stop drinking it the night before surgery.    If your care team tells you to take medicine on the morning of surgery, it's okay to take it with a sip of water.  Preventing infection    Shower or bathe the night before and morning of your surgery. Follow the instructions your clinic gave you. (If no instructions, use regular soap.)    Don't shave or clip hair near your surgery site. We'll remove the hair if needed.    Don't smoke or vape the morning of surgery. You may chew nicotine gum up to 2 hours before surgery. A nicotine patch is okay.  ? Note: Some surgeries require you to completely quit smoking and nicotine. Check with your surgeon.    Your care team will make every effort to keep you safe from infection. We will:  ? Clean our hands often with soap and water (or an alcohol-based hand rub).  ? Clean the skin at your surgery site with a special soap that kills germs.  ? Give you a special gown to keep you warm. (Cold raises the risk of infection.)  ? Wear special hair covers, masks, gowns and gloves during surgery.  ? Give antibiotic medicine, if prescribed. Not all surgeries need antibiotics.  What to bring on the day of surgery    Photo ID and insurance card    Copy of your health care directive, if you have one    Glasses and hearing aides (bring cases)  ? You can't wear contacts during surgery    Inhaler and eye drops, if you use them (tell us  about these when you arrive)    CPAP machine or breathing device, if you use them    A few personal items, if spending the night    If you have . . .  ? A pacemaker, ICD (cardiac defibrillator) or other implant: Bring the ID card.  ? An implanted stimulator: Bring the remote control.  ? A legal guardian: Bring a copy of the certified (court-stamped) guardianship papers.  Please remove any jewelry, including body piercings. Leave jewelry and other valuables at home.  If you're going home the day of surgery    You must have a responsible adult drive you home. They should stay with you overnight as well.    If you don't have someone to stay with you, and you aren't safe to go home alone, we may keep you overnight. Insurance often won't pay for this.  After surgery  If it's hard to control your pain or you need more pain medicine, please call your surgeon's office.  Questions?   If you have any questions for your care team, list them here: _________________________________________________________________________________________________________________________________________________________________________ ____________________________________ ____________________________________ ____________________________________  For informational purposes only. Not to replace the advice of your health care provider. Copyright   2003, 2019 [facility]. All rights reserved. Clinically reviewed by Elsy Brito MD. [place] [id number] - REV 07/22.

## 2022-09-30 NOTE — PROGRESS NOTES
[facility] [address] [place] [address]  Phone: [phone]  Primary Provider: Angelica Maya  Pre-op Performing Provider: JUSTO KU      PREOPERATIVE EVALUATION:  Today's date: 9/30/2022    Manjinder Esparza is a 57 year old male who presents for a preoperative evaluation.    Surgical Information:  Surgery/Procedure: R  distal biceps tendon repair  Surgery Location: [facility]  Surgeon: Dr. Roy  Surgery Date: 10/05/2022  Time of Surgery: 12:45   Where patient plans to recover: At home with family  Fax number for surgical facility: [phone]    Type of Anesthesia Anticipated: to be determined    Assessment & Plan     The proposed surgical procedure is considered LOW risk.    Preop general physical exam  Reviewed medical/social/family history and health maintenance    Rupture of right biceps tendon, subsequent encounter      Atrial fibrillation, unspecified type (H)  Rate controlled on Metoprolol.    CAIO (obstructive sleep apnea)  On CPAP.    Benign essential hypertension  Elevated today but was normotensive at last visit.  Has a preventative visit on Monday.  We discussed if diastolic continues to be elevated, addition of hydrochlorothiazide would be reasonable.      Heavy alcohol use      Need for prophylactic vaccination and inoculation against influenza  Flu and COVID bivalent today.        RECOMMENDATION:  APPROVAL GIVEN to proceed with proposed procedure, without further diagnostic evaluation.            Subjective     HPI related to upcoming procedure: Distal bicep tendon repair.    Preop Questions 9/30/2022   1. Have you ever had a heart attack or stroke? No   2. Have you ever had surgery on your heart or blood vessels, such as a stent placement, a coronary artery bypass, or surgery on an artery in your head, neck, heart, or legs? No   3. Do you have chest pain with activity? No   4. Do you have a history of  heart failure? No   5. Do you currently have a  cold, bronchitis or symptoms of other infection? No   6. Do you have a cough, shortness of breath, or wheezing? No   7. Do you or anyone in your family have previous history of blood clots? No   8. Do you or does anyone in your family have a serious bleeding problem such as prolonged bleeding following surgeries or cuts? No   9. Have you ever had problems with anemia or been told to take iron pills? No   10. Have you had any abnormal blood loss such as black, tarry or bloody stools? No   11. Have you ever had a blood transfusion? No   12. Are you willing to have a blood transfusion if it is medically needed before, during, or after your surgery? Yes   13. Have you or any of your relatives ever had problems with anesthesia? No   14. Do you have sleep apnea, excessive snoring or daytime drowsiness? YES -    14a. Do you have a CPAP machine? Yes   15. Do you have any artifical heart valves or other implanted medical devices like a pacemaker, defibrillator, or continuous glucose monitor? No   16. Do you have artificial joints? No   17. Are you allergic to latex? No       Health Care Directive:  Patient does not have a Health Care Directive or Living Will: Discussed advance care planning with patient; however, patient declined at this time.    Preoperative Review of :   reviewed - controlled substances reflected in medication list.          Review of Systems  Constitutional, neuro, ENT, endocrine, pulmonary, cardiac, gastrointestinal, genitourinary, musculoskeletal, integument and psychiatric systems are negative, except as otherwise noted.    Patient Active Problem List    Diagnosis Date Noted     CAIO (obstructive sleep apnea) 09/16/2021     Priority: Medium     Dizziness 09/16/2021     Priority: Medium     Numbness and tingling in left arm 09/16/2021     Priority: Medium     Heavy alcohol use 09/16/2021     Priority: Medium     Pulmonary nodule, 2mm, RML 07/19/2018     Priority: Medium     2mm, RML       Benign  lipomatous neoplasm of skin and subcutaneous tissue of trunk 07/19/2018     Priority: Medium     Paroxysmal A-fib (H) 06/28/2018     Priority: Medium     Benign essential hypertension 11/07/2016     Priority: Medium     CARDIOVASCULAR SCREENING; LDL GOAL LESS THAN 160 05/09/2010     Priority: Medium     Per provider        Past Medical History:   Diagnosis Date     Atrial fibrillation (H)      Gastroesophageal reflux disease      Hypertension      Past Surgical History:   Procedure Laterality Date     ANESTHESIA CARDIOVERSION N/A 6/29/2018    Procedure: ANESTHESIA CARDIOVERSION;  Cardioversion;  Surgeon: GENERIC ANESTHESIA PROVIDER;  Location: [facility] OR     COLONOSCOPY       COLONOSCOPY N/A 3/11/2022    Procedure: COLONOSCOPY;  Surgeon: Josh Villalobos MD;  Location:  GI     SURGICAL HISTORY OF -       animal bone graft in the left lower jaw     Current Outpatient Medications   Medication Sig Dispense Refill     aspirin (ASA) 81 MG chewable tablet Take 81 mg by mouth daily       losartan (COZAAR) 25 MG tablet Take 12.5 mg by mouth daily       metoprolol succinate ER (TOPROL XL) 50 MG 24 hr tablet Take 1 tablet (50 mg) by mouth daily *DO NOT CRUSH. MAY SPLIT IN HALF ALONG SCORE LINE 90 tablet 0     sulfamethoxazole-trimethoprim (BACTRIM DS) 800-160 MG tablet Take 1 tablet by mouth 2 times daily 14 tablet 0       Allergies   Allergen Reactions     Lisinopril Cough        Social History     Tobacco Use     Smoking status: Never Smoker     Smokeless tobacco: Never Used   Substance Use Topics     Alcohol use: Yes     Alcohol/week: 6.0 - 9.0 standard drinks     Types: 6 - 9 Cans of beer per week     Comment: 10-14 drinks a week        History   Drug Use No         Objective     There were no vitals taken for this visit.    Physical Exam    GENERAL APPEARANCE: healthy, alert and no distress     EYES: EOMI,  PERRL     HENT: ear canals and TM's normal and nose and mouth without ulcers or lesions     NECK: no adenopathy,  no asymmetry, masses, or scars and thyroid normal to palpation     RESP: lungs clear to auscultation - no rales, rhonchi or wheezes     CV: regular rates and rhythm, normal S1 S2, no S3 or S4 and no murmur, click or rub     ABDOMEN:  soft, nontender, no HSM or masses and bowel sounds normal     MS: extremities normal- no gross deformities noted, no evidence of inflammation in joints, FROM in all extremities.     SKIN: no suspicious lesions or rashes     NEURO: Normal strength and tone, sensory exam grossly normal, mentation intact and speech normal     PSYCH: mentation appears normal. and affect normal/bright     LYMPHATICS: No cervical adenopathy    Recent Labs   Lab Test 09/16/21  1005 08/09/21  1551   HGB 14.6  --      --     143   POTASSIUM 4.5 3.7   CR 1.17 1.10        Diagnostics:  No labs were ordered during this visit.   No EKG required for low risk surgery (cataract, skin procedure, breast biopsy, etc).    Revised Cardiac Risk Index (RCRI):  The patient has the following serious cardiovascular risks for perioperative complications:   - No serious cardiac risks = 0 points     RCRI Interpretation: 0 points: Class I (very low risk - 0.4% complication rate)           Signed Electronically by: JOHNNY Stovall CNP  Copy of this evaluation report is provided to requesting physician.

## 2022-09-30 NOTE — NURSING NOTE
Prior to immunization administration, verified patients identity using patient s name and date of birth. Please see Immunization Activity for additional information.     Screening Questionnaire for Adult Immunization    Are you sick today?   No   Do you have allergies to medications, food, a vaccine component or latex?   No   Have you ever had a serious reaction after receiving a vaccination?   No   Do you have a long-term health problem with heart, lung, kidney, or metabolic disease (e.g., diabetes), asthma, a blood disorder, no spleen, complement component deficiency, a cochlear implant, or a spinal fluid leak?  Are you on long-term aspirin therapy?   No   Do you have cancer, leukemia, HIV/AIDS, or any other immune system problem?   No   Do you have a parent, brother, or sister with an immune system problem?   No   In the past 3 months, have you taken medications that affect  your immune system, such as prednisone, other steroids, or anticancer drugs; drugs for the treatment of rheumatoid arthritis, Crohn s disease, or psoriasis; or have you had radiation treatments?   No   Have you had a seizure, or a brain or other nervous system problem?   No   During the past year, have you received a transfusion of blood or blood    products, or been given immune (gamma) globulin or antiviral drug?   No   For women: Are you pregnant or is there a chance you could become       pregnant during the next month?   No   Have you received any vaccinations in the past 4 weeks?   No     Immunization questionnaire answers were all negative.        Per orders of Dr. villalba, injection of fluzone and pfizer bivalent  given by Suha Landrum. Patient instructed to remain in clinic for 15 minutes afterwards, and to report any adverse reaction to me immediately.       Suha Landrum on 9/30/2022 at 3:57 PM          Screening performed by Suha Landrum on 9/30/2022 at 3:56 PM.

## 2022-09-30 NOTE — Clinical Note
You see this kathy on Monday.  Was normotensive in UC, but elevated for me today.  May want to consider hydrochlorothiazide if DBP remains elevated.

## 2022-10-03 ENCOUNTER — OFFICE VISIT (OUTPATIENT)
Dept: FAMILY MEDICINE | Facility: CLINIC | Age: 57
End: 2022-10-03
Payer: COMMERCIAL

## 2022-10-03 VITALS
RESPIRATION RATE: 15 BRPM | WEIGHT: 202 LBS | OXYGEN SATURATION: 97 % | HEART RATE: 82 BPM | SYSTOLIC BLOOD PRESSURE: 130 MMHG | HEIGHT: 72 IN | BODY MASS INDEX: 27.36 KG/M2 | DIASTOLIC BLOOD PRESSURE: 88 MMHG | TEMPERATURE: 98 F

## 2022-10-03 DIAGNOSIS — R26.89 IMBALANCE: ICD-10-CM

## 2022-10-03 DIAGNOSIS — Z23 NEED FOR PNEUMOCOCCAL VACCINATION: ICD-10-CM

## 2022-10-03 DIAGNOSIS — I48.0 PAROXYSMAL A-FIB (H): ICD-10-CM

## 2022-10-03 DIAGNOSIS — G44.209 TENSION-TYPE HEADACHE, NOT INTRACTABLE, UNSPECIFIED CHRONICITY PATTERN: ICD-10-CM

## 2022-10-03 DIAGNOSIS — D22.9 MULTIPLE PIGMENTED NEVI: ICD-10-CM

## 2022-10-03 DIAGNOSIS — K40.90 LEFT INGUINAL HERNIA: ICD-10-CM

## 2022-10-03 DIAGNOSIS — R91.8 PULMONARY NODULES: ICD-10-CM

## 2022-10-03 DIAGNOSIS — M50.30 DDD (DEGENERATIVE DISC DISEASE), CERVICAL: ICD-10-CM

## 2022-10-03 DIAGNOSIS — H90.5 SENSORINEURAL HEARING LOSS (SNHL), UNSPECIFIED LATERALITY: ICD-10-CM

## 2022-10-03 DIAGNOSIS — N52.9 ERECTILE DYSFUNCTION, UNSPECIFIED ERECTILE DYSFUNCTION TYPE: ICD-10-CM

## 2022-10-03 DIAGNOSIS — R42 DIZZINESS: ICD-10-CM

## 2022-10-03 DIAGNOSIS — M26.609 TMJ (TEMPOROMANDIBULAR JOINT SYNDROME): ICD-10-CM

## 2022-10-03 DIAGNOSIS — Z00.00 HEALTH CARE MAINTENANCE: ICD-10-CM

## 2022-10-03 DIAGNOSIS — R20.2 NUMBNESS AND TINGLING IN LEFT ARM: ICD-10-CM

## 2022-10-03 DIAGNOSIS — Z12.5 SCREENING FOR PROSTATE CANCER: ICD-10-CM

## 2022-10-03 DIAGNOSIS — R19.7 DIARRHEA, UNSPECIFIED TYPE: ICD-10-CM

## 2022-10-03 DIAGNOSIS — D17.1 BENIGN LIPOMATOUS NEOPLASM OF SKIN AND SUBCUTANEOUS TISSUE OF TRUNK: ICD-10-CM

## 2022-10-03 DIAGNOSIS — Z13.1 SCREENING FOR DIABETES MELLITUS: ICD-10-CM

## 2022-10-03 DIAGNOSIS — H93.13 TINNITUS, BILATERAL: ICD-10-CM

## 2022-10-03 DIAGNOSIS — G47.33 OSA (OBSTRUCTIVE SLEEP APNEA): ICD-10-CM

## 2022-10-03 DIAGNOSIS — Z80.0 FAMILY HISTORY OF COLON CANCER: ICD-10-CM

## 2022-10-03 DIAGNOSIS — H53.9 VISUAL DISTURBANCE: ICD-10-CM

## 2022-10-03 DIAGNOSIS — R11.0 NAUSEA: ICD-10-CM

## 2022-10-03 DIAGNOSIS — Z12.11 COLON CANCER SCREENING: ICD-10-CM

## 2022-10-03 DIAGNOSIS — R12 HEART BURN: ICD-10-CM

## 2022-10-03 DIAGNOSIS — F10.90 HEAVY ALCOHOL USE: ICD-10-CM

## 2022-10-03 DIAGNOSIS — Z71.89 ADVANCED DIRECTIVES, COUNSELING/DISCUSSION: ICD-10-CM

## 2022-10-03 DIAGNOSIS — R20.0 NUMBNESS AND TINGLING IN LEFT ARM: ICD-10-CM

## 2022-10-03 DIAGNOSIS — I10 BENIGN ESSENTIAL HYPERTENSION: ICD-10-CM

## 2022-10-03 DIAGNOSIS — Z00.00 ROUTINE HISTORY AND PHYSICAL EXAMINATION OF ADULT: Primary | ICD-10-CM

## 2022-10-03 LAB
ALBUMIN SERPL-MCNC: 3.9 G/DL (ref 3.4–5)
ALP SERPL-CCNC: 59 U/L (ref 40–150)
ALT SERPL W P-5'-P-CCNC: 24 U/L (ref 0–70)
ANION GAP SERPL CALCULATED.3IONS-SCNC: 4 MMOL/L (ref 3–14)
AST SERPL W P-5'-P-CCNC: 22 U/L (ref 0–45)
BASOPHILS # BLD AUTO: 0.1 10E3/UL (ref 0–0.2)
BASOPHILS NFR BLD AUTO: 1 %
BILIRUB SERPL-MCNC: 0.7 MG/DL (ref 0.2–1.3)
BUN SERPL-MCNC: 21 MG/DL (ref 7–30)
CALCIUM SERPL-MCNC: 9.4 MG/DL (ref 8.5–10.1)
CHLORIDE BLD-SCNC: 109 MMOL/L (ref 94–109)
CHOLEST SERPL-MCNC: 154 MG/DL
CO2 SERPL-SCNC: 27 MMOL/L (ref 20–32)
CREAT SERPL-MCNC: 1.16 MG/DL (ref 0.66–1.25)
CREAT UR-MCNC: 164 MG/DL
EOSINOPHIL # BLD AUTO: 0.2 10E3/UL (ref 0–0.7)
EOSINOPHIL NFR BLD AUTO: 3 %
ERYTHROCYTE [DISTWIDTH] IN BLOOD BY AUTOMATED COUNT: 11.9 % (ref 10–15)
FASTING STATUS PATIENT QL REPORTED: YES
FERRITIN SERPL-MCNC: 141 NG/ML (ref 26–388)
GFR SERPL CREATININE-BSD FRML MDRD: 74 ML/MIN/1.73M2
GLUCOSE BLD-MCNC: 122 MG/DL (ref 70–99)
HBA1C MFR BLD: 5.4 % (ref 0–5.6)
HCT VFR BLD AUTO: 43.3 % (ref 40–53)
HDLC SERPL-MCNC: 43 MG/DL
HGB BLD-MCNC: 14.5 G/DL (ref 13.3–17.7)
IMM GRANULOCYTES # BLD: 0 10E3/UL
IMM GRANULOCYTES NFR BLD: 0 %
IRON SATN MFR SERPL: 41 % (ref 15–46)
IRON SERPL-MCNC: 115 UG/DL (ref 35–180)
LDLC SERPL CALC-MCNC: 82 MG/DL
LIPASE SERPL-CCNC: 42 U/L (ref 13–60)
LYMPHOCYTES # BLD AUTO: 1.3 10E3/UL (ref 0.8–5.3)
LYMPHOCYTES NFR BLD AUTO: 24 %
MCH RBC QN AUTO: 30.7 PG (ref 26.5–33)
MCHC RBC AUTO-ENTMCNC: 33.5 G/DL (ref 31.5–36.5)
MCV RBC AUTO: 92 FL (ref 78–100)
MICROALBUMIN UR-MCNC: 9 MG/L
MICROALBUMIN/CREAT UR: 5.49 MG/G CR (ref 0–17)
MONOCYTES # BLD AUTO: 0.9 10E3/UL (ref 0–1.3)
MONOCYTES NFR BLD AUTO: 16 %
NEUTROPHILS # BLD AUTO: 3.1 10E3/UL (ref 1.6–8.3)
NEUTROPHILS NFR BLD AUTO: 56 %
NONHDLC SERPL-MCNC: 111 MG/DL
PLATELET # BLD AUTO: 223 10E3/UL (ref 150–450)
POTASSIUM BLD-SCNC: 4.8 MMOL/L (ref 3.4–5.3)
PROT SERPL-MCNC: 7.3 G/DL (ref 6.8–8.8)
PSA SERPL-MCNC: 1.81 UG/L (ref 0–4)
RBC # BLD AUTO: 4.72 10E6/UL (ref 4.4–5.9)
SODIUM SERPL-SCNC: 140 MMOL/L (ref 133–144)
TIBC SERPL-MCNC: 283 UG/DL (ref 240–430)
TRIGL SERPL-MCNC: 147 MG/DL
TSH SERPL DL<=0.005 MIU/L-ACNC: 1.85 MU/L (ref 0.4–4)
VIT B12 SERPL-MCNC: 328 PG/ML (ref 232–1245)
WBC # BLD AUTO: 5.5 10E3/UL (ref 4–11)

## 2022-10-03 PROCEDURE — 82043 UR ALBUMIN QUANTITATIVE: CPT | Performed by: FAMILY MEDICINE

## 2022-10-03 PROCEDURE — G0103 PSA SCREENING: HCPCS | Performed by: FAMILY MEDICINE

## 2022-10-03 PROCEDURE — 99396 PREV VISIT EST AGE 40-64: CPT | Performed by: FAMILY MEDICINE

## 2022-10-03 PROCEDURE — 80050 GENERAL HEALTH PANEL: CPT | Performed by: FAMILY MEDICINE

## 2022-10-03 PROCEDURE — 83036 HEMOGLOBIN GLYCOSYLATED A1C: CPT | Performed by: FAMILY MEDICINE

## 2022-10-03 PROCEDURE — 82728 ASSAY OF FERRITIN: CPT | Performed by: FAMILY MEDICINE

## 2022-10-03 PROCEDURE — 82607 VITAMIN B-12: CPT | Performed by: FAMILY MEDICINE

## 2022-10-03 PROCEDURE — 99214 OFFICE O/P EST MOD 30 MIN: CPT | Mod: 25 | Performed by: FAMILY MEDICINE

## 2022-10-03 PROCEDURE — 83550 IRON BINDING TEST: CPT | Performed by: FAMILY MEDICINE

## 2022-10-03 PROCEDURE — 80061 LIPID PANEL: CPT | Performed by: FAMILY MEDICINE

## 2022-10-03 PROCEDURE — 83690 ASSAY OF LIPASE: CPT | Performed by: FAMILY MEDICINE

## 2022-10-03 PROCEDURE — 36415 COLL VENOUS BLD VENIPUNCTURE: CPT | Performed by: FAMILY MEDICINE

## 2022-10-03 PROCEDURE — 87338 HPYLORI STOOL AG IA: CPT | Performed by: FAMILY MEDICINE

## 2022-10-03 ASSESSMENT — ENCOUNTER SYMPTOMS
JOINT SWELLING: 0
COUGH: 0
SHORTNESS OF BREATH: 0
DIZZINESS: 0
MYALGIAS: 0
CONSTIPATION: 0
FREQUENCY: 0
HEMATOCHEZIA: 0
HEARTBURN: 1
NAUSEA: 1
PALPITATIONS: 1
FEVER: 0
SORE THROAT: 0
PARESTHESIAS: 0
CHILLS: 0
HEMATURIA: 0
EYE PAIN: 0
DIARRHEA: 1
HEADACHES: 1
ARTHRALGIAS: 1
NERVOUS/ANXIOUS: 0
WEAKNESS: 0
ABDOMINAL PAIN: 0
DYSURIA: 0

## 2022-10-03 NOTE — PROGRESS NOTES
SUBJECTIVE:   CC: Manjinder is an 57 year old who presents for preventative health visit.     Patient has been advised of split billing requirements and indicates understanding: Yes  Healthy Habits:     Getting at least 3 servings of Calcium per day:  Yes    Bi-annual eye exam:  Yes    Dental care twice a year:  Yes    Sleep apnea or symptoms of sleep apnea:  Daytime drowsiness, Excessive snoring and Sleep apnea    Diet:  Regular (no restrictions)    Frequency of exercise:  4-5 days/week    Duration of exercise:  30-45 minutes    Taking medications regularly:  Yes    Medication side effects:  Lightheadedness    PHQ-2 Total Score: 0    Additional concerns today:  Yes  Would like blood test for BP meds prescribe by his cardiologist   Establish care today     57-year-old gentleman BMI more than 27, paroxysmal A. fib on no anticoagulation [id number] score of 1, rate controlled on metoprolol 50 mg XL daily and on aspirin, cardiology Dr. Perez at the [facility] previously noted infrequent atrial fibrillation, felt chest pain was musculoskeletal in nature with a negative stress test 8/2021 some aortic calcification on imaging.  Fatigue possibly related to metoprolol.  Switched care to Dr. Navarro cardiologist at [facility] last seen October 2021 for atypical chest pain, A. Fib, and symptoms of intermittent fatigue and shortness of breath.  A 14-day Zio patch was done which showed normal sinus bradycardia and echocardiogram was done which showed normal EF and no valvular heart disease, apical chest pain resolved by stopping push-ups, mid and left jaw and neck pain at that time assessed not to be due to a tooth or cardiac in nature, history of obstructive sleep apnea on CPAP, hypertension on metoprolol and losartan, history of heavy alcohol use noted in the chart, pulmonary nodule 2 to 3 mm right middle lobe noted stable at 3 mm on last scan in 2019 when no further follow-up recommended, history of dizziness and imbalance in the  past, heartburn, nausea, loose stools, headaches, hx of TMJ, numbness and tingling left arm, sensorineural hearing loss, bilateral tinnitus, cervical DDD, erectile dysfunction, lipoma left back, multiple pigmented nevi, wears glasses, history of bone graft jaw in the past, colonoscopy x2 last in 2022, on vitamin D and glucosamine, under care of PCP Dr. Bonilla.  Seen by PCP 9/10/2021, for a physical, suspected chest pain at the time was costochondritis given negative stress test a month prior, given a new diagnosis of hypertension was to see his cardiologist regarding medications.  Referred to orthopedics for numbness in his left arm and labs done for symptom of dizziness.  Had normal CBC, CMP, HIV and hepatitis C testing, x-ray showed cervical degenerative disease and referred to physical therapy.  Seen by audiology 10/4/2021 for sensorineural hearing loss and bilateral tinnitus.  Seen by cardiologist at [facility] in oct for paroxsmal atrial fibrillation/palpitations: Noted to be normal sinus rhythm sinus bradycardia.  Felt his concerns of intermittent fatigue and shortness of breath could be due to underlying recurrent A. fib and a 14-day Zio patch and resting echocardiogram was ordered.  Reassured on nonexertional atypical chest pain that improved with exercise and not brought on with exertion.  Reviewed prior stress test that had been normal.  Blood pressure was goal and continued on Toprol-XL at the time.  Felt left jaw pain was not consistent with TMJ or angina and a prior dental exam-negative for abscess and had no symptoms with exertion with negative nuclear stress test.  Reassurance given.  14-day Holter came back showing sinus bradycardia with first-degree AV block and echo showed normal left ventricular systolic function estimated EF was 60 to 65% with no hemodynamic significant valvular heart disease and compared to study dated 6/28/2018 at [place] no major changes noted.   Seen by ENT 12/21/2021 for  sensorineural hearing loss bilateral tinnitus suspected right TMJ and some imbalance and recommended physical therapy.  Physical therapy not done for off for jaw.  Sometimes and seen cardiologist noted elevated blood pressure called and was started on losartan by them.  On 3/11/2022 colonoscopy done was normal and advise recheck in 5 years given family history of colon cancer.  Seen in urgent care 8/17/2022 for rash not to talk screen was negative.  On 9/17/2022 was moving something in his garage when his bicep popped and curled up in a ball in his upper arm.  He did not have significant amount of pain and was still functional.  Seen by orthopedics and scheduled for surgery then seen by Jessica Hartley 9/30/2022 for right biceps tendon rupture repair scheduled for 10/5/2022    Here for a physical, new to this provider.  No  concerns  HM reviewed  Thinks maybe has ACP at home  Flu, covid , Tdap, shngrex utd  Hold off on prennar 20 for now, as got 2 shots on the 30th  Family history of colon cancer and last colonoscopy 3/2022 was normal and advise recheck in 5 years which would be 2027    Afib, noticed about 5 years ago, cardioverted once, not on anticoagulation, [id number] score of 1, on aspirin 81 mg daily and rate controlled on metoprolol 50 mg XL daily.  Previously seeing Dr. Perez at the [facility] last seen by them in September 2021, then transferred care to Dr. Navarro at [facility] seen by them October 2021.  Continued on metoprolol, noted stress test for atypical chest pain in August was negative and symptoms resolved once stopped push-ups.  14-day Zio patch and echocardiogram repeated at [facility] for history of fatigue and shortness of breath intermittently turned out to be normal and no changes made in medications.  Has not followed up again with cardiology and due for an appointment with them.    CAIO on CPAP, under care of sleep, not seen in a while, even with cpap use does not sleep well due to anxiety and  stress at work.  Open to a referral to sleep.  Declines need for counseling currently.    Hypertension stable on metoprolol 50 mg XL initially prescribed for A. fib and losartan 12.5 mg reported prescribed by the cardiologist but desires to get further refills to PCP.  Just got losartan refilled and does not need a refill quite yet.    Alcohol use only on weekends drinks more than 2 drinks at a time.  Feels it is a form of relaxation over the weekend.    BMI 27, agreeable to lab work today.    Pulmonary nodule noted 2 mm right middle lobe and past CT scan last done in 2019 noted it was 3 mm but report also said it was stable and no further follow-up needed.  Lifelong non-smoker.  Risk is low.      Dizziness/ imbalance felt initially blood pressure related now that blood pressure has been better has not felt in a long time.  Because if he has a good night of sleep and other symptoms are minimal.    Heartburn/ nausea quite a bit in the morning especially.  Drinks alcohol on weekends.  For a while was taking apple cider vinegar which helped.  Stress of work.    Loose stools, colonosocpy 3/2022 normal, fh of colon cancer. If eats rich meals runs to bathroom. ? IBS, tried an elimination diet or Lactaid yet.    Episodic headaches, history of TMJ.  Prior bone grafting jaw, feels pain on left side of jaw related to his neck and upper back.  Feels like a chronic tooth ache that goes down into left upper back and neck.  Wonders if it is related to the lipoma left upper back.  Went to the dentist thinking it was a nerve issue but told it was not a dental issue.  Seen by cardiology and ruled out angina and stress test was negative.  Had numbness tingling in left arm last year x-ray C-spine did show degenerative disc disease was advised physical therapy but never scheduled.  Seen by ENT in December last year and suspected at TMJ and also referred to physical therapy but not scheduled.  As if it is related to how he carries his  phone.  Went over over phone using left arm scrolling frequently, when he works out jaw stops hurting which suggests more musculoskeletal cause.  Is actively looking to try and get a new job.    Sensorineural hearing loss and bilateral tinnitus which is getting worse has not returned to ENT audiology.  Trouble hearing.    Erectile dysfunction couple years more of being disinterested rather than inability to have an erection.  Urinary symptoms.    Lipoma left upper back not changed wondering if that is pressing on a nerve causing musculoskeletal symptoms.    Visual disturbance noted as needed for glasses.  A couple times last year he felt some darkness over his left visual field lasted 5 to 6 minutes.  Reported no oral migraines at the time.  Has occurred a couple times last few years.  Discussed with prior PCP but not with the eye doctor.  Recently seen the eye doctor and no concerns found.      Today's PHQ-2 Score:   PHQ-2 ( 1999 Pf[employer]) 10/3/2022   Q1: Little interest or pleasure in doing things 0   Q2: Feeling down, depressed or hopeless 0   PHQ-2 Score 0   PHQ-2 Total Score (12-17 Years)- Positive if 3 or more points; Administer PHQ-A if positive -   Q1: Little interest or pleasure in doing things Not at all   Q2: Feeling down, depressed or hopeless Not at all   PHQ-2 Score 0     Abuse: Current or Past(Physical, Sexual or Emotional)- No  Do you feel safe in your environment? Yes    Social History     Tobacco Use     Smoking status: Never Smoker     Smokeless tobacco: Never Used   Substance Use Topics     Alcohol use: Yes     Alcohol/week: 6.0 - 9.0 standard drinks     Types: 6 - 9 Cans of beer per week     Comment: 10-14 drinks a week      If you drink alcohol do you typically have >3 drinks per day or >7 drinks per week? Yes    Alcohol Use 10/3/2022   Prescreen: >3 drinks/day or >7 drinks/week? Yes   Prescreen: >3 drinks/day or >7 drinks/week? -   AUDIT SCORE  6     AUDIT - Alcohol Use Disorders Identification  Test - Reproduced from the World Health Organization Audit 2001 (Second Edition) 10/3/2022   1.  How often do you have a drink containing alcohol? 2 to 3 times a week   2.  How many drinks containing alcohol do you have on a typical day when you are drinking? 3 or 4   3.  How often do you have five or more drinks on one occasion? Monthly   4.  How often during the last year have you found that you were not able to stop drinking once you had started? Never   5.  How often during the last year have you failed to do what was normally expected of you because of drinking? Never   6.  How often during the last year have you needed a first drink in the morning to get yourself going after a heavy drinking session? Never   7.  How often during the last year have you had a feeling of guilt or remorse after drinking? Never   8.  How often during the last year have you been unable to remember what happened the night before because of your drinking? Never   9.  Have you or someone else been injured because of your drinking? No   10. Has a relative, friend, doctor or other health care worker been concerned about your drinking or suggested you cut down? No   TOTAL SCORE 6     Last PSA:   PSA   Date Value Ref Range Status   09/09/2020 1.25 0 - 4 ug/L Final     Comment:     Assay Method:  Chemiluminescence using Siemens Vista analyzer     Prostate Specific Antigen Screen   Date Value Ref Range Status   10/03/2022 1.81 0.00 - 4.00 ug/L Final     Reviewed orders with patient. Reviewed health maintenance and updated orders accordingly - Yes  Lab work is in process  Labs reviewed in EPIC  BP Readings from Last 3 Encounters:   10/03/22 130/88   09/30/22 (!) 145/100   08/17/22 134/84    Wt Readings from Last 3 Encounters:   10/03/22 91.6 kg (202 lb)   09/30/22 89.4 kg (197 lb)   03/11/22 89.8 kg (198 lb)                  Patient Active Problem List   Diagnosis     Benign essential hypertension     Paroxysmal A-fib (H)     Pulmonary  nodule, 2mm, RML     Benign lipomatous neoplasm of skin and subcutaneous tissue of trunk     CAIO (obstructive sleep apnea)     Dizziness     Numbness and tingling in left arm     Heavy alcohol use     Imbalance     Sensorineural hearing loss (SNHL), unspecified laterality     Tinnitus, bilateral     TMJ (temporomandibular joint syndrome)     DDD (degenerative disc disease), cervical     Erectile dysfunction, unspecified erectile dysfunction type     Family history of colon cancer     Past Surgical History:   Procedure Laterality Date     ANESTHESIA CARDIOVERSION N/A 6/29/2018    Procedure: ANESTHESIA CARDIOVERSION;  Cardioversion;  Surgeon: GENERIC ANESTHESIA PROVIDER;  Location: [facility] OR     COLONOSCOPY       COLONOSCOPY N/A 3/11/2022    Procedure: COLONOSCOPY;  Surgeon: Josh Villalobos MD;  Location:  GI     SURGICAL HISTORY OF -       animal bone graft in the left lower jaw       Social History     Tobacco Use     Smoking status: Never Smoker     Smokeless tobacco: Never Used   Substance Use Topics     Alcohol use: Yes     Alcohol/week: 6.0 - 9.0 standard drinks     Types: 6 - 9 Cans of beer per week     Comment: 10-14 drinks a week      Family History   Problem Relation Age of Onset     Diabetes Mother         type 2     Hypertension Mother      Cancer - colorectal Mother 65     Circulatory Mother         varicose veins     Cataracts Mother         cataracts     Atrial fibrillation Mother      Hypertension Father      Prostate Cancer Father      Arthritis Father         in his knees     Lipids Father      Lipids Brother      Prostate Cancer Brother      Eye Disorder Maternal Grandmother         cataracts     Cancer Maternal Grandmother      Cancer Maternal Grandfather         lung     Cerebrovascular Disease Paternal Grandmother      Allergies Other         self, developing allergies      Gastrointestinal Disease Other         self, may have ibs         Current Outpatient Medications   Medication Sig  Dispense Refill     aspirin (ASA) 81 MG chewable tablet Take 81 mg by mouth daily       cholecalciferol 25 MCG (1000 UT) TABS Take 25 mcg by mouth       losartan (COZAAR) 25 MG tablet Take 12.5 mg by mouth daily       metoprolol succinate ER (TOPROL XL) 50 MG 24 hr tablet Take 1 tablet (50 mg) by mouth daily *DO NOT CRUSH. MAY SPLIT IN HALF ALONG SCORE LINE 90 tablet 0     Allergies   Allergen Reactions     Lisinopril Cough     Recent Labs   Lab Test 10/03/22  0905 09/16/21  1005 08/09/21  1551 08/09/21  1551 09/09/20  1226 07/18/19  1638 07/16/18  1728 07/10/18  1130   A1C 5.4  --   --   --   --   --   --  5.4   LDL 82  --   --   --  94 80   < >  --    HDL 43  --   --   --  51 36*   < >  --    TRIG 147  --   --   --  147 145   < >  --    ALT 24 32  --  24 28 24   < >  --    CR 1.16 1.17   < > 1.10 1.17 1.09   < >  --    GFRESTIMATED 74 70   < > 75 70 77   < >  --    GFRESTBLACK  --   --   --   --  81 89   < >  --    POTASSIUM 4.8 4.5   < > 3.7 4.1 3.9   < >  --    TSH 1.85 1.56  --   --   --  2.22  --   --     < > = values in this interval not displayed.        Reviewed and updated as needed this visit by clinical staff   Tobacco  Allergies  Meds  Problems  Med Hx  Surg Hx  Fam Hx  Soc   Hx          Reviewed and updated as needed this visit by Provider   Tobacco  Allergies  Meds  Problems  Med Hx  Surg Hx  Fam Hx  Soc   Hx         Past Medical History:   Diagnosis Date     Atrial fibrillation (H)      Gastroesophageal reflux disease      Hypertension       Past Surgical History:   Procedure Laterality Date     ANESTHESIA CARDIOVERSION N/A 6/29/2018    Procedure: ANESTHESIA CARDIOVERSION;  Cardioversion;  Surgeon: GENERIC ANESTHESIA PROVIDER;  Location: [facility] OR     COLONOSCOPY       COLONOSCOPY N/A 3/11/2022    Procedure: COLONOSCOPY;  Surgeon: Josh Villalobos MD;  Location:  GI     SURGICAL HISTORY OF -       animal bone graft in the left lower jaw     OB History   No obstetric history on file.  "      Review of Systems   Constitutional: Negative for chills and fever.   HENT: Positive for hearing loss. Negative for congestion, ear pain and sore throat.    Eyes: Positive for visual disturbance. Negative for pain.   Respiratory: Negative for cough and shortness of breath.    Cardiovascular: Positive for palpitations. Negative for chest pain and peripheral edema.   Gastrointestinal: Positive for diarrhea, heartburn and nausea. Negative for abdominal pain, constipation and hematochezia.   Genitourinary: Positive for impotence. Negative for dysuria, frequency, genital sores, hematuria, penile discharge and urgency.   Musculoskeletal: Positive for arthralgias. Negative for joint swelling and myalgias.   Skin: Negative for rash.   Neurological: Positive for headaches. Negative for dizziness, weakness and paresthesias.   Psychiatric/Behavioral: Negative for mood changes. The patient is not nervous/anxious.      OBJECTIVE:   /88 (BP Location: Left arm, Patient Position: Chair, Cuff Size: Adult Regular)   Pulse 82   Temp 98  F (36.7  C) (Temporal)   Resp 15   Ht 1.816 m (5' 11.5\")   Wt 91.6 kg (202 lb)   SpO2 97%   BMI 27.78 kg/m      Physical Exam  GENERAL: healthy, alert, no distress and over weight  EYES: Eyes grossly normal to inspection, PERRL and conjunctivae and sclerae normal  HENT: ear canals and TM's normal, nose and mouth without ulcers or lesions, hard of hearing  NECK: no adenopathy, no asymmetry, masses, or scars and thyroid normal to palpation  RESP: lungs clear to auscultation - no rales, rhonchi or wheezes  CV: regular rate and rhythm, normal S1 S2, no S3 or S4, no murmur, click or rub, no peripheral edema and peripheral pulses strong  ABDOMEN: soft, non tender, no hepatosplenomegaly, no masses and bowel sounds normal   (male): normal male genitalia without lesions or urethral discharge, inguinal bulge noted reproducible non tender questionable fat collection versus hernia  MS: no gross " musculoskeletal defects noted, no edema  SKIN: no suspicious lesions or rashes, 5 cm rubbery discrete lump posterior axillary line below the level of inferior angle of scapula suggestive of a lipoma  NEURO: Normal strength and tone, mentation intact and speech normal  PSYCH: mentation appears normal, affect normal/bright    Diagnostic Test Results:  Labs reviewed in Epic  Results for orders placed or performed in visit on 10/03/22 (from the past 24 hour(s))   CBC with platelets and differential    Narrative    The following orders were created for panel order CBC with platelets and differential.  Procedure                               Abnormality         Status                     ---------                               -----------         ------                     CBC with platelets and d...[[id number]]                      Final result                 Please view results for these tests on the individual orders.   Comprehensive metabolic panel (BMP + Alb, Alk Phos, ALT, AST, Total. Bili, TP)   Result Value Ref Range    Sodium 140 133 - 144 mmol/L    Potassium 4.8 3.4 - 5.3 mmol/L    Chloride 109 94 - 109 mmol/L    Carbon Dioxide (CO2) 27 20 - 32 mmol/L    Anion Gap 4 3 - 14 mmol/L    Urea Nitrogen 21 7 - 30 mg/dL    Creatinine 1.16 0.66 - 1.25 mg/dL    Calcium 9.4 8.5 - 10.1 mg/dL    Glucose 122 (H) 70 - 99 mg/dL    Alkaline Phosphatase 59 40 - 150 U/L    AST 22 0 - 45 U/L    ALT 24 0 - 70 U/L    Protein Total 7.3 6.8 - 8.8 g/dL    Albumin 3.9 3.4 - 5.0 g/dL    Bilirubin Total 0.7 0.2 - 1.3 mg/dL    GFR Estimate 74 >60 mL/min/1.73m2   Lipid Profile (Chol, Trig, HDL, LDL calc)   Result Value Ref Range    Cholesterol 154 <200 mg/dL    Triglycerides 147 <150 mg/dL    Direct Measure HDL 43 >=40 mg/dL    LDL Cholesterol Calculated 82 <=100 mg/dL    Non HDL Cholesterol 111 <130 mg/dL    Patient Fasting > 8hrs? Yes     Narrative    Cholesterol  Desirable:  <200 mg/dL    Triglycerides  Normal:  Less than 150  mg/dL  Borderline High:  150-199 mg/dL  High:  200-499 mg/dL  Very High:  Greater than or equal to 500 mg/dL    Direct Measure HDL  Female:  Greater than or equal to 50 mg/dL   Male:  Greater than or equal to 40 mg/dL    LDL Cholesterol  Desirable:  <100mg/dL  Above Desirable:  100-129 mg/dL   Borderline High:  130-159 mg/dL   High:  160-189 mg/dL   Very High:  >= 190 mg/dL    Non HDL Cholesterol  Desirable:  130 mg/dL  Above Desirable:  130-159 mg/dL  Borderline High:  160-189 mg/dL  High:  190-219 mg/dL  Very High:  Greater than or equal to 220 mg/dL   TSH with free T4 reflex   Result Value Ref Range    TSH 1.85 0.40 - 4.00 mU/L   Hemoglobin A1c   Result Value Ref Range    Hemoglobin A1C 5.4 0.0 - 5.6 %   Albumin Random Urine Quantitative with Creat Ratio   Result Value Ref Range    Creatinine Urine mg/dL 164 mg/dL    Albumin Urine mg/L 9 mg/L    Albumin Urine mg/g Cr 5.49 0.00 - 17.00 mg/g Cr   PSA, screen   Result Value Ref Range    Prostate Specific Antigen Screen 1.81 0.00 - 4.00 ug/L   Vitamin B12   Result Value Ref Range    Vitamin B12 328 232 - 1,245 pg/mL   Ferritin   Result Value Ref Range    Ferritin 141 26 - 388 ng/mL   Iron and iron binding capacity   Result Value Ref Range    Iron 115 35 - 180 ug/dL    Iron Binding Capacity 283 240 - 430 ug/dL    Iron Sat Index 41 15 - 46 %   Lipase   Result Value Ref Range    Lipase 42 13 - 60 U/L   CBC with platelets and differential   Result Value Ref Range    WBC Count 5.5 4.0 - 11.0 10e3/uL    RBC Count 4.72 4.40 - 5.90 10e6/uL    Hemoglobin 14.5 13.3 - 17.7 g/dL    Hematocrit 43.3 40.0 - 53.0 %    MCV 92 78 - 100 fL    MCH 30.7 26.5 - 33.0 pg    MCHC 33.5 31.5 - 36.5 g/dL    RDW 11.9 10.0 - 15.0 %    Platelet Count 223 150 - 450 10e3/uL    % Neutrophils 56 %    % Lymphocytes 24 %    % Monocytes 16 %    % Eosinophils 3 %    % Basophils 1 %    % Immature Granulocytes 0 %    Absolute Neutrophils 3.1 1.6 - 8.3 10e3/uL    Absolute Lymphocytes 1.3 0.8 - 5.3 10e3/uL     Absolute Monocytes 0.9 0.0 - 1.3 10e3/uL    Absolute Eosinophils 0.2 0.0 - 0.7 10e3/uL    Absolute Basophils 0.1 0.0 - 0.2 10e3/uL    Absolute Immature Granulocytes 0.0 <=0.4 10e3/uL       ASSESSMENT/PLAN:       ICD-10-CM    1. Routine history and physical examination of adult  Z00.00 Hemoglobin A1c     PSA, screen     Hemoglobin A1c     PSA, screen   2. Paroxysmal A-fib (H)  I48.0 TSH with free T4 reflex     Adult Sleep Eval & Management  Referral     TSH with free T4 reflex    hx of palpitations   3. CAIO (obstructive sleep apnea)  G47.33 TSH with free T4 reflex     Adult Sleep Eval & Management  Referral     TSH with free T4 reflex   4. Benign essential hypertension  I10 Comprehensive metabolic panel (BMP + Alb, Alk Phos, ALT, AST, Total. Bili, TP)     Lipid Profile (Chol, Trig, HDL, LDL calc)     TSH with free T4 reflex     Albumin Random Urine Quantitative with Creat Ratio     PRIMARY CARE FOLLOW-UP SCHEDULING     Comprehensive metabolic panel (BMP + Alb, Alk Phos, ALT, AST, Total. Bili, TP)     Lipid Profile (Chol, Trig, HDL, LDL calc)     TSH with free T4 reflex     Albumin Random Urine Quantitative with Creat Ratio   5. Heavy alcohol use  F10.90 CBC with platelets and differential     Comprehensive metabolic panel (BMP + Alb, Alk Phos, ALT, AST, Total. Bili, TP)     Lipid Profile (Chol, Trig, HDL, LDL calc)     TSH with free T4 reflex     Vitamin B12     Ferritin     Iron and iron binding capacity     CBC with platelets and differential     Comprehensive metabolic panel (BMP + Alb, Alk Phos, ALT, AST, Total. Bili, TP)     Lipid Profile (Chol, Trig, HDL, LDL calc)     TSH with free T4 reflex     Vitamin B12     Ferritin     Iron and iron binding capacity   6. BMI 27.0-27.9,adult  Z68.27 Comprehensive metabolic panel (BMP + Alb, Alk Phos, ALT, AST, Total. Bili, TP)     Lipid Profile (Chol, Trig, HDL, LDL calc)     TSH with free T4 reflex     Comprehensive metabolic panel (BMP + Alb, Alk Phos,  ALT, AST, Total. Bili, TP)     Lipid Profile (Chol, Trig, HDL, LDL calc)     TSH with free T4 reflex   7. Pulmonary nodule, 2mm, RML  R91.8     Noted 3 mm on last CT scan in 2019 when noted stable and no further follow-up needed   8. Dizziness  R42 CBC with platelets and differential     Comprehensive metabolic panel (BMP + Alb, Alk Phos, ALT, AST, Total. Bili, TP)     TSH with free T4 reflex     Vitamin B12     Ferritin     Iron and iron binding capacity     CBC with platelets and differential     Comprehensive metabolic panel (BMP + Alb, Alk Phos, ALT, AST, Total. Bili, TP)     TSH with free T4 reflex     Vitamin B12     Ferritin     Iron and iron binding capacity   9. Imbalance  R26.89 CBC with platelets and differential     Comprehensive metabolic panel (BMP + Alb, Alk Phos, ALT, AST, Total. Bili, TP)     TSH with free T4 reflex     Vitamin B12     Ferritin     Iron and iron binding capacity     Physical Therapy Referral     CBC with platelets and differential     Comprehensive metabolic panel (BMP + Alb, Alk Phos, ALT, AST, Total. Bili, TP)     TSH with free T4 reflex     Vitamin B12     Ferritin     Iron and iron binding capacity   10. Heart burn  R12 Comprehensive metabolic panel (BMP + Alb, Alk Phos, ALT, AST, Total. Bili, TP)     Adult GI  Referral - Consult Only     Lipase     Helicobacter pylori Antigen Stool     Comprehensive metabolic panel (BMP + Alb, Alk Phos, ALT, AST, Total. Bili, TP)     Lipase     Helicobacter pylori Antigen Stool   11. Nausea  R11.0 Comprehensive metabolic panel (BMP + Alb, Alk Phos, ALT, AST, Total. Bili, TP)     Adult GI  Referral - Consult Only     Lipase     Helicobacter pylori Antigen Stool     Comprehensive metabolic panel (BMP + Alb, Alk Phos, ALT, AST, Total. Bili, TP)     Lipase     Helicobacter pylori Antigen Stool   12. Diarrhea, unspecified type  R19.7    13. Tension-type headache, not intractable, unspecified chronicity pattern  G44.209 CBC with  platelets and differential     Adult Sleep Eval & Management  Referral     Adult ENT  Referral     CBC with platelets and differential   14. TMJ (temporomandibular joint syndrome)  M26.609 Physical Therapy Referral     Adult ENT  Referral   15. Numbness and tingling in left arm  R20.0 CBC with platelets and differential    R20.2 Comprehensive metabolic panel (BMP + Alb, Alk Phos, ALT, AST, Total. Bili, TP)     TSH with free T4 reflex     Vitamin B12     Ferritin     Iron and iron binding capacity     Physical Therapy Referral     CBC with platelets and differential     Comprehensive metabolic panel (BMP + Alb, Alk Phos, ALT, AST, Total. Bili, TP)     TSH with free T4 reflex     Vitamin B12     Ferritin     Iron and iron binding capacity   16. Sensorineural hearing loss (SNHL), unspecified laterality  H90.5 Adult ENT  Referral     Adult Audiology  Referral   17. Tinnitus, bilateral  H93.13 Adult ENT  Referral     Adult Audiology  Referral   18. DDD (degenerative disc disease), cervical  M50.30 Physical Therapy Referral   19. Erectile dysfunction, unspecified erectile dysfunction type  N52.9 Adult Urology  Referral   20. Left inguinal hernia  K40.90    21. Benign lipomatous neoplasm of skin and subcutaneous tissue of trunk  D17.1     left below scapula lavel posterior axillary line 5 cm    22. Multiple pigmented nevi  D22.9    23. Visual disturbance  H53.9     darkness in front of eye at times   24. Health care maintenance  Z00.00 REVIEW OF HEALTH MAINTENANCE PROTOCOL ORDERS   25. Advanced directives, counseling/discussion  Z71.89    26. Family history of colon cancer  Z80.0    27. Colon cancer screening  Z12.11    28. Need for pneumococcal vaccination  Z23     will get another day    29. Screening for diabetes mellitus  Z13.1 Hemoglobin A1c     Hemoglobin A1c   30. Screening for prostate cancer  Z12.5 PSA, screen     PSA, screen     Seen for  preventive health and additional concerns today     History of paroxysmal A. fib cardioverted 5 years ago x1, [id number] score 1 currently anticoagulation not needed continue aspirin 81 mg daily, rate controlled on metoprolol 50 mg extended release daily under care of cardiologist Dr. Navarro at [facility], felt symptoms of fatigue and shortness of breath at that time related to A. Fib. Zio patch done showed normal sinus rhythm and a resting echocardiogram was normal in 2021 with normal ejection fraction and no significant valvular heart disease  History of atypical chest pain stress test negative 8/2021 & symptoms resolved once stopped push-ups. Follow-up with cardiology yearly.    Obstructive sleep apnea on a CPAP not seen sleep in a whil,  referral to sleep given to be assessed as may be contributing to some current symptoms.  We also discussed sleep therapy and stress & anxiety and lack of sleep may be contributing to physical symptoms.    Hypertension currently stable on metoprolol extended release 50 mg given for A. fib by cardiology as well as losartan 12.5 mg started since last seen by cardiology.  Currently has enough refills on losartan. May have pharmacy send us a request to refill losartan  if and when needed. Recommend follow-up in 6 months for blood pressure check.  Continue with low-salt diet, weight loss and drinking alcohol in moderation.    Alcohol use may be exceeding current clinical guidelines.  Recommend 0-2 drinks in a 24-hour timeframe no more than 5-7 times a week, no more than 7 total drinks a week.  Alcohol can be a source of extra calories, contribute to A. fib, hypertension, weight gain, palpitations, sleep and anxiety issues, heartburn, nausea & erectile dysfunction. Consider counseling if difficulty decreasing alcohol consumption or if using alcohol as self treatment for anxiety and stress.  There are also medications available to help anxiety.    Pulmonary nodule 2 mm right  middle lobe noted to be unchanged on serial imaging & at  3 mm at last CT scan done in 2019 requiring no further follow-up.  Reports is a lifetime non-smoker and thus low risk.    Dizziness and imbalance improved with better blood pressure control and does better if sleeps well through the night.  Sleep hygiene discussed briefly, recommended a cool, dark room that is quiet, avoid screen time right before bedtime.  Will refer to physical therapy as well to evaluate and treat prior noted imbalance.  Currently hold  off on neurology consultation.    Heartburn and nausea could be related to lifestyle, diet and alcohol intake.  Recommend frequent small meals, avoid eating 3 hours before bedtime, limit spicy foods, trigger foods, caffeine intake, alcohol etc.  We will check lab work to see what is going on as well as stool test to check for H. pylori.  If not better with symptomatic care, try Pepcid famotidine 20 mg over-the-counter daily for few weeks.  If not improved see GI to further evaluate and treat & may benefit from an endoscopy.  Referral in place.    History of loose stools, colonoscopy in March 2022 was normal, family history of colon cancer.  Rich meals tend to make him run to the bathroom.  Recommend a low-fat diet and avoid dairy and use Lactaid if necessary and see if that makes a difference.  Possible irritable bowel syndrome, brain gut connection/stress anxiety etc. Increasing fiber in diet and staying active may be helpful.  Follow-up with GI if not better    Episodic tension headaches mostly on the left side possibly related to sleep apnea, TMJ, left upper back and neck issues.  Will refer to sleep, ENT and physical therapy to assess for possible sleep apnea, TMJ and musculoskeletal causes.    History of cervical degenerative disc disease noted on prior x-ray and intermittent improved left arm tingling numbness.  Could have arthritis in neck contributing to symptoms on the left upper back.  Will refer  to physical therapy and if not better consider MRI and referral to the spine doctor.  Also see what ENT says about TMJ may be contributing to symptoms.  Stress relief with yoga, meditation & a new job might be helpful    For worsening sensorineural hearing loss and tinnitus bilaterally referred back to ENT and audiology.    Erectile dysfunction could be multifactorial related to aging, stress, anxiety, alcohol use, medication metoprolol.  Will check lab work to rule out diabetes and thyroid issues.  Follow-up with his  cardiologist regarding the metoprolol.  Referred to see the urologist to further evaluate and treat    On exam today noted a left inguinal bulge suggestive of possible small hernia.  Difficult to completely confirm without an ultrasound.  Currently asymptomatic and opted to defer imaging.  Monitor for increased pain, swelling, tenderness & warmth.  Avoid heavy lifting.  If it gets hard, firm, painful or red associated with any nausea, vomiting, blood in stools etc then go to the emergency room.  If it is symptomatic with intermittent pain then can refer to the general surgeon to have it repaired.      5 cm left below shoulder blade lipoma in the posterior axillary line.  Unlikely this is contributing to left neck /upper back/ jaw or head symptoms.  May discuss with his dermatologist.    Multiple pigmented moles, continue yearly skin check with a dermatologist.    History of visual disturbance requiring glasses, also noted darkness in front of eye at times without any absolute vision loss.  No history of migraines.  Recommend to discuss with the eye doctor and can refer to neurology if recurs.    Healthcare maintenance reviewed.  Consider working on healthcare directives. Honoring choices given to review.  Colon cancer screening due 2027 given family history of colon cancer due every 5 years,  Recommend self testicular check regularly   Labs today and will make further recommendations once  "reviewed  Flu shot, COVID bivalent vaccine booster, Shngrex vaccine and Tdap up-to-date.  Due for pneumonia vaccine given history and will plan to get another day    To have right biceps tendon repair 10/5 and had preop done on 30 September.  Return in  6 months for blood pressure check and sooner for any new concerns     Patient has been advised of split billing requirements and indicates understanding: Yes    COUNSELING:   Reviewed preventive health counseling, as reflected in patient instructions       Regular exercise       Healthy diet/nutrition       Vision screening       Hearing screening       Immunizations       Aspirin prophylaxis        Alcohol Use        Colorectal cancer screening       Prostate cancer screening       The 10-year ASCVD risk score (Yessy GALAVIZ Jr., et al., 2013) is: 6.3%    Values used to calculate the score:      Age: 57 years      Sex: Male      Is Non- : No      Diabetic: No      Tobacco smoker: No      Systolic Blood Pressure: 130 mmHg      Is BP treated: Yes      HDL Cholesterol: 43 mg/dL      Total Cholesterol: 154 mg/dL       Advance Care Planning    Estimated body mass index is 27.78 kg/m  as calculated from the following:    Height as of this encounter: 1.816 m (5' 11.5\").    Weight as of this encounter: 91.6 kg (202 lb).     Weight management plan: Discussed healthy diet and exercise guidelines    He reports that he has never smoked. He has never used smokeless tobacco.      Counseling Resources:  ATP IV Guidelines  Pooled Cohorts Equation Calculator  FRAX Risk Assessment  ICSI Preventive Guidelines  Dietary Guidelines for Americans, 2010  USDA's MyPlate  ASA Prophylaxis  Lung CA Screening    Angelica Maya MD  [facility]    "

## 2022-10-03 NOTE — RESULT ENCOUNTER NOTE
Hello Mr. Esparza,  Your results came back showing   -PSA (prostate specific antigen) test is normal.  This indicates a low likelihood of prostate cancer.  ADVISE: rechecking this in 1 year.  The 10-year ASCVD risk score (Yessy GALAVIZ Jr., et al., 2013) is: 6.3%    Values used to calculate the score:      Age: 57 years      Sex: Male      Is Non- : No      Diabetic: No      Tobacco smoker: No      Systolic Blood Pressure: 130 mmHg      Is BP treated: Yes      HDL Cholesterol: 43 mg/dL      Total Cholesterol: 154 mg/dL  -Cholesterol levels (LDL,HDL, Triglycerides) are normal.  ADVISE: rechecking in 1 year.   -Liver and gallbladder tests (ALT,AST, Alk phos,bilirubin) are normal.  -Kidney function (GFR) is normal.  -Sodium is normal.  -Potassium is normal.  -Calcium is normal.  -Glucose is slight elevated and may be a sign of early diabetes (prediabetes). ADVISE:: eating a low carbohydrate diet, exercising, trying to lose weight (if necessary) and rechecking your glucose level in 12 months.  -TSH (thyroid stimulating hormone) level is normal which indicates normal thyroid function.  -Ferritin (iron) level is normal.  . If you have any further concerns please do not hesitate to contact us by message, phone or making an appointment.  Have a good day   Dr Francesco BRENNAN

## 2022-10-03 NOTE — RESULT ENCOUNTER NOTE
Cuong Mr. Esparza,  Some of your results came back and are within acceptable limits. -Normal red blood cell (hgb) levels, normal white blood cell count and normal platelet levels.  -A1C (diabetic test) is normal and indicates that your blood sugar has been in a normal range the last 3 months.. If you have any further concerns please do not hesitate to contact us by message, phone or making an appointment.  Have a good day   Dr Francesco BRENNAN

## 2022-10-03 NOTE — RESULT ENCOUNTER NOTE
Hello Mr. Esparza,  Your results came back so far with a normal iron level   If you have any further concerns please do not hesitate to contact us by message, phone or making an appointment.  Have a good day   Dr Francesco BRENNAN

## 2022-10-03 NOTE — PATIENT INSTRUCTIONS
Seen for preventive health and additional concerns today     History of paroxysmal A. fib cardioverted 5 years ago x1, [id number] score 1 currently anticoagulation not needed continue aspirin 81 mg daily, rate controlled on metoprolol 50 mg extended release daily under care of cardiologist Dr. Navarro at [facility], felt symptoms of fatigue and shortness of breath at that time related to A. fib.  Zio patch done showed normal sinus rhythm and S2 resting echocardiogram was normal in 2021 with normal ejection fraction and no significant valvular heart disease  History of atypical chest pain stress test negative symptoms resolved once stopped push-ups.  Follow-up with cardiology yearly.    Obstructive sleep apnea on a CPAP not seen sleep in a while referral to sleep given to be assessed as may be contributing to some symptoms.  We also discussed sleep therapy and stress anxiety and lack of sleep may contribute to physical symptoms.    Hypertension currently stable on metoprolol extended release 50 mg given for A. fib by cardiology as well as losartan 12.5 mg started since last seen by cardiology.  Currently has enough refills on losartan. may have pharmacy send us a request refill if and when needed.  Recommend follow-up in 6 months for blood pressure check.  Continue with low-salt diet    Pulmonary nodule 2 mm right middle lobe noted to be unchanged 3 mm at last CT scan done in 2019 requiring no further follow-up.    Alcohol use may be exceeding current clinical guidelines.  Recommend 0-2 drinks in a 24-hour timeframe no more than 5-7 times a week no more than 7 total drinks a week.  Alcohol can be a source of extra calories, contribute to A. fib, hypertension, weight gain, palpitations, sleep and anxiety issues, heartburn, nausea & erectile dysfunction. Consider counseling if difficulty decreasing alcohol consumption or if using alcohol as self treatment for anxiety and stress.  There are also medications  available to help anxiety.    Dizziness and imbalance improved with better blood pressure control and does better if sleeps well through the night.  Sleep hygiene discussed briefly, recommended a cool, dark room that is quiet, avoid screen time right before bedtime.  Will refer to physical therapy as well to evaluate and treat prior noted imbalance.  Currently hold  off on neurology consultation.    Heartburn and nausea could be related to lifestyle diet and alcohol intake.  Recommend frequent small meals, avoid eating 3 hours before bedtime, limit spicy foods trigger foods, caffeine intake, alcohol etc.  We will check lab work to see what is going on as well as stool test to check for H. pylori.  If not better with symptomatic care try Pepcid famotidine 20 mg over-the-counter daily for few weeks.  If not improved see GI to further evaluate and treat may benefit from an endoscopy.  Referral in place.    History of loose stools, colonoscopy in March 2022 was normal, family history of colon cancer.  Rich meals tend to make you run to the bathroom.  Recommend a low-fat diet and avoid dairy and use Lactaid if necessary and see if that makes a difference.  Possible irritable bowel syndrome, brain gut connection/stress anxiety etc. increasing fiber in diet and staying active may be helpful.  Follow-up with GI if not better    Episodic tension headaches mostly on the left side possibly related to sleep apnea, TMJ, left upper back and neck issues.  Will refer to sleep ENT and physical therapy to assess for sleep apnea, TMJ and musculoskeletal causes.    History of cervical degenerative disc disease noted on prior x-ray and intermittent improve left arm tingling numbness.  Could have arthritis in neck contributing to symptoms on the left upper back.  Will refer to physical therapy and if not better consider MRI and referral to the spine doctor.  Also see what ENT says about TMJ may be contributing to symptoms.  Stress relief  with yoga, meditation & a new job might be helpful    For worsening sensorineural hearing loss and tinnitus bilaterally referred back to ENT and audiology.    Erectile dysfunction could be multifactorial related to aging, stress, anxiety, alcohol use, medication metoprolol.  Will check lab work to rule out diabetes and thyroid issues.  Follow-up with your cardiologist regarding the metoprolol.  Refer to see the urologist to further evaluate and treat    On exam today noted a left inguinal bulge suggestive of possible small hernia.  Difficult to completely confirm without an ultrasound.  Currently asymptomatic and opted to defer imaging.  Monitor for increased pain swelling tenderness warmth.  Avoid heavy lifting.  If it gets hard firm painful red associated with any nausea vomiting blood in your stools then go to the emergency room.  If it is symptomatic with intermittent pain then can refer to the general surgeon to have it repaired.      5 cm left below shoulder blade lipoma in the posterior axillary line.  Unlikely this is contributing to left neck upper back jaw or head symptoms.  May discuss with your dermatologist.    Multiple pigmented moles, continue yearly skin check with a dermatologist.    History of visual disturbance requiring glasses, also noted darkness in front of eye at times without any absolute vision loss.  No history of migraines.  Recommend to discuss with the eye doctor and can refer to neurology if recurs.    Healthcare maintenance reviewed.  Consider working on healthcare directives. Honoring choices given to review.  Recommend pneumonia vaccine plans to get another day.  Colon cancer screening due 2027 given family history of colon cancer due every 5 years,  Recommend self testicular check regularly   Labs today and will make further recommendations once reviewed  Flu shot, COVID bivalent vaccine booster, Shingrix vaccine and Tdap up-to-date.  Due for pneumonia vaccine given history and  will plan to get another day    To have right biceps tendon repair 10/5 and had preop done on 30 September.  Return in  6 months for blood pressure check and sooner for any new concerns    Preventive Health Recommendations  Male Ages 50 - 64    Yearly exam:             See your health care provider every year in order to  o   Review health changes.   o   Discuss preventive care.    o   Review your medicines if your doctor has prescribed any.   Have a cholesterol test every 5 years, or more frequently if you are at risk for high cholesterol/heart disease.   Have a diabetes test (fasting glucose) every three years. If you are at risk for diabetes, you should have this test more often.   Have a colonoscopy at age 50, or have a yearly FIT test (stool test). These exams will check for colon cancer.    Talk with your health care provider about whether or not a prostate cancer screening test (PSA) is right for you.  You should be tested each year for STDs (sexually transmitted diseases), if you re at risk.     Shots: Get a flu shot each year. Get a tetanus shot every 10 years.     Nutrition:  Eat at least 5 servings of fruits and vegetables daily.   Eat whole-grain bread, whole-wheat pasta and brown rice instead of white grains and rice.   Get adequate Calcium and Vitamin D.     Lifestyle  Exercise for at least 150 minutes a week (30 minutes a day, 5 days a week). This will help you control your weight and prevent disease.   Limit alcohol to one drink per day.   No smoking.   Wear sunscreen to prevent skin cancer.   See your dentist every six months for an exam and cleaning.   See your eye doctor every 1 to 2 years.

## 2022-10-03 NOTE — RESULT ENCOUNTER NOTE
Hello Mr. Esparza,  Your results came back and are within acceptable limits. -Microalbumin (urine protein) test is normal.  ADVISE: rechecking this annually.  - normal lipase ( pancreatic enzyme) & vitamin b 12.  If you have any further concerns please do not hesitate to contact us by message, phone or making an appointment.  Have a good day   Dr Francesco BRENNAN

## 2022-10-06 LAB — H PYLORI AG STL QL IA: NEGATIVE

## 2022-10-06 NOTE — RESULT ENCOUNTER NOTE
Hello Mr. Esparza,  Your results came back negative for h pylori   If you have any further concerns please do not hesitate to contact us by message, phone or making an appointment.  Have a good day   Dr Farncesco BRENNAN

## 2022-10-16 DIAGNOSIS — I10 HYPERTENSION, UNSPECIFIED TYPE: ICD-10-CM

## 2022-10-16 DIAGNOSIS — I47.10 PAROXYSMAL SUPRAVENTRICULAR TACHYCARDIA (H): ICD-10-CM

## 2022-10-20 RX ORDER — METOPROLOL SUCCINATE 50 MG/1
50 TABLET, EXTENDED RELEASE ORAL DAILY
Qty: 90 TABLET | Refills: 0 | Status: SHIPPED | OUTPATIENT
Start: 2022-10-20 | End: 2023-11-06

## 2022-10-20 NOTE — TELEPHONE ENCOUNTER
METOPROLOL ER SUCCINATE 50MG TABS      Last Written Prescription Date:  6-29-22  Last Fill Quantity: 90,   # refills: 0  Last Office Visit : 8-12-21 ( RTC 1 Y)  Future Office visit:  none    Routing refill request to provider for review/approval because:  ? Establish care outside provider or  ? 2nd opinion        See outside card note 10-11-21

## 2023-01-12 ENCOUNTER — TRANSFERRED RECORDS (OUTPATIENT)
Dept: HEALTH INFORMATION MANAGEMENT | Facility: CLINIC | Age: 58
End: 2023-01-12

## 2023-04-27 ENCOUNTER — OFFICE VISIT (OUTPATIENT)
Dept: FAMILY MEDICINE | Facility: CLINIC | Age: 58
End: 2023-04-27
Payer: COMMERCIAL

## 2023-04-27 VITALS
RESPIRATION RATE: 16 BRPM | HEIGHT: 72 IN | HEART RATE: 53 BPM | BODY MASS INDEX: 26.28 KG/M2 | SYSTOLIC BLOOD PRESSURE: 119 MMHG | DIASTOLIC BLOOD PRESSURE: 78 MMHG | WEIGHT: 194 LBS | TEMPERATURE: 97.4 F | OXYGEN SATURATION: 99 %

## 2023-04-27 DIAGNOSIS — Z53.9 ERRONEOUS ENCOUNTER--DISREGARD: ICD-10-CM

## 2023-04-27 DIAGNOSIS — Z00.00 ROUTINE GENERAL MEDICAL EXAMINATION AT A HEALTH CARE FACILITY: Primary | ICD-10-CM

## 2023-04-27 NOTE — PROGRESS NOTES
SUBJECTIVE:     Visit canceled today; scheduling note:  He wanted to establish more regular long term care. He has been seen at this clinic most recently with Dr. Maya, 10/22, so preventive not due. Since he's not due for a preventive visit and I wouldn't be able to add him on to my panel, he elected to cancel visit today.  Sincerely,  Dr. Liat Bonilla MD  4/27/2023        See his note following, I can see him for wellness exam but I can't establish care with me as a primary as my panel is full -[facility]  Scheduling note from patient:   I would like to establish a long term more permanent PCP, Dr. Bonilla recommended by Dr. Honey Hearn. purpose of the visit is routine general health and wellness, discuss a chronic back/shoulder/jaw pain that has been suggested to be a MS condition by cardiologist. I would like to begin comprehensive thoughtful review to get to the bottom of this issue.      Today's PHQ-2 Score:       4/27/2023     2:58 PM   PHQ-2 ( 1999 [employer])   Q1: Little interest or pleasure in doing things 0   Q2: Feeling down, depressed or hopeless 0   PHQ-2 Score 0   Q1: Little interest or pleasure in doing things Not at all   Q2: Feeling down, depressed or hopeless Not at all   PHQ-2 Score 0           Social History     Tobacco Use     Smoking status: Never     Passive exposure: Never     Smokeless tobacco: Never   Vaping Use     Vaping status: Never Used   Substance Use Topics     Alcohol use: Yes     Alcohol/week: 6.0 - 9.0 standard drinks of alcohol     Types: 6 - 9 Cans of beer per week     Comment: 10-14 drinks a week              10/3/2022     8:02 AM   Alcohol Use   Prescreen: >3 drinks/day or >7 drinks/week? Yes   AUDIT SCORE  6       Last PSA:   PSA   Date Value Ref Range Status   09/09/2020 1.25 0 - 4 ug/L Final     Comment:     Assay Method:  Chemiluminescence using Siemens Vista analyzer     Prostate Specific Antigen Screen   Date Value Ref Range Status   10/03/2022 1.81 0.00 - 4.00 ug/L Final                Liat Bonilla MD  [facility]  This encounter was opened in error. Please disregard.  Answers for HPI/ROS submitted by the patient on 4/27/2023  What is the reason for your visit today? : Check up and follow up on symptoms  How many servings of fruits and vegetables do you eat daily?: 0-1  On average, how many sweetened beverages do you drink each day (Examples: soda, juice, sweet tea, etc.  Do NOT count diet or artificially sweetened beverages)?: 0  How many minutes a day do you exercise enough to make your heart beat faster?: 30 to 60  How many days a week do you exercise enough to make your heart beat faster?: 5  How many days per week do you miss taking your medication?: 0

## 2023-04-27 NOTE — PROGRESS NOTES
{PROVIDER CHARTING PREFERENCE:900755}    Andreia Dunbar is a 57 year old, presenting for the following health issues:  Physical and General (Questions/)        4/27/2023     3:07 PM   Additional Questions   Roomed by whit bean         4/27/2023     3:07 PM   Patient Reported Additional Medications   Patient reports taking the following new medications none     HPI     {SUPERLIST (Optional):385970}  {additonal problems for provider to add (Optional):161905}      Review of Systems   {ROS COMP (Optional):616757}      Objective    There were no vitals taken for this visit.  There is no height or weight on file to calculate BMI.  Physical Exam   {Exam List (Optional):645732}    {Diagnostic Test Results (Optional):161443}    {AMBULATORY ATTESTATION (Optional):011984}

## 2023-11-05 RX ORDER — SILDENAFIL 25 MG/1
TABLET, FILM COATED ORAL
COMMUNITY
Start: 2023-01-11

## 2023-11-06 ENCOUNTER — OFFICE VISIT (OUTPATIENT)
Dept: FAMILY MEDICINE | Facility: CLINIC | Age: 58
End: 2023-11-06
Payer: COMMERCIAL

## 2023-11-06 VITALS
DIASTOLIC BLOOD PRESSURE: 82 MMHG | WEIGHT: 195.8 LBS | BODY MASS INDEX: 27.41 KG/M2 | TEMPERATURE: 97.6 F | OXYGEN SATURATION: 100 % | HEIGHT: 71 IN | HEART RATE: 61 BPM | RESPIRATION RATE: 18 BRPM | SYSTOLIC BLOOD PRESSURE: 132 MMHG

## 2023-11-06 DIAGNOSIS — Z13.220 SCREENING FOR LIPID DISORDERS: ICD-10-CM

## 2023-11-06 DIAGNOSIS — Z12.5 SCREENING FOR PROSTATE CANCER: ICD-10-CM

## 2023-11-06 DIAGNOSIS — I10 BENIGN ESSENTIAL HYPERTENSION: ICD-10-CM

## 2023-11-06 DIAGNOSIS — R91.8 PULMONARY NODULES: ICD-10-CM

## 2023-11-06 DIAGNOSIS — Z83.79 FAMILY HISTORY OF GALLSTONES: ICD-10-CM

## 2023-11-06 DIAGNOSIS — Z00.00 ROUTINE GENERAL MEDICAL EXAMINATION AT A HEALTH CARE FACILITY: Primary | ICD-10-CM

## 2023-11-06 DIAGNOSIS — N50.89 TESTICLE LUMP: ICD-10-CM

## 2023-11-06 DIAGNOSIS — N50.812 PAIN IN LEFT TESTICLE: ICD-10-CM

## 2023-11-06 DIAGNOSIS — R11.0 NAUSEA: ICD-10-CM

## 2023-11-06 DIAGNOSIS — I48.0 PAROXYSMAL A-FIB (H): ICD-10-CM

## 2023-11-06 DIAGNOSIS — H61.22 EXCESSIVE CERUMEN IN EAR CANAL, LEFT: ICD-10-CM

## 2023-11-06 DIAGNOSIS — M89.8X1 SHOULDER BLADE PAIN: ICD-10-CM

## 2023-11-06 DIAGNOSIS — R68.84 JAW PAIN: ICD-10-CM

## 2023-11-06 DIAGNOSIS — I47.10 PAROXYSMAL SUPRAVENTRICULAR TACHYCARDIA (H): ICD-10-CM

## 2023-11-06 DIAGNOSIS — Z13.1 SCREENING FOR DIABETES MELLITUS: ICD-10-CM

## 2023-11-06 PROBLEM — F10.90 HEAVY ALCOHOL USE: Status: RESOLVED | Noted: 2021-09-16 | Resolved: 2023-11-06

## 2023-11-06 LAB
ALBUMIN SERPL BCG-MCNC: 4.4 G/DL (ref 3.5–5.2)
ALP SERPL-CCNC: 54 U/L (ref 40–129)
ALT SERPL W P-5'-P-CCNC: 16 U/L (ref 0–70)
ANION GAP SERPL CALCULATED.3IONS-SCNC: 11 MMOL/L (ref 7–15)
AST SERPL W P-5'-P-CCNC: 28 U/L (ref 0–45)
BILIRUB SERPL-MCNC: 0.9 MG/DL
BUN SERPL-MCNC: 17.2 MG/DL (ref 6–20)
CALCIUM SERPL-MCNC: 9.5 MG/DL (ref 8.6–10)
CHLORIDE SERPL-SCNC: 104 MMOL/L (ref 98–107)
CHOLEST SERPL-MCNC: 164 MG/DL
CREAT SERPL-MCNC: 1.16 MG/DL (ref 0.67–1.17)
CREAT UR-MCNC: 108 MG/DL
DEPRECATED HCO3 PLAS-SCNC: 24 MMOL/L (ref 22–29)
EGFRCR SERPLBLD CKD-EPI 2021: 73 ML/MIN/1.73M2
ERYTHROCYTE [DISTWIDTH] IN BLOOD BY AUTOMATED COUNT: 11.9 % (ref 10–15)
GLUCOSE SERPL-MCNC: 101 MG/DL (ref 70–99)
HBA1C MFR BLD: 5.3 % (ref 0–5.6)
HCT VFR BLD AUTO: 42.6 % (ref 40–53)
HDLC SERPL-MCNC: 42 MG/DL
HGB BLD-MCNC: 14.3 G/DL (ref 13.3–17.7)
LDLC SERPL CALC-MCNC: 102 MG/DL
MCH RBC QN AUTO: 31 PG (ref 26.5–33)
MCHC RBC AUTO-ENTMCNC: 33.6 G/DL (ref 31.5–36.5)
MCV RBC AUTO: 92 FL (ref 78–100)
MICROALBUMIN UR-MCNC: <12 MG/L
MICROALBUMIN/CREAT UR: NORMAL MG/G{CREAT}
NONHDLC SERPL-MCNC: 122 MG/DL
PLATELET # BLD AUTO: 211 10E3/UL (ref 150–450)
POTASSIUM SERPL-SCNC: 4.4 MMOL/L (ref 3.4–5.3)
PROT SERPL-MCNC: 7.1 G/DL (ref 6.4–8.3)
PSA SERPL DL<=0.01 NG/ML-MCNC: 0.97 NG/ML (ref 0–3.5)
RBC # BLD AUTO: 4.61 10E6/UL (ref 4.4–5.9)
SODIUM SERPL-SCNC: 139 MMOL/L (ref 135–145)
TRIGL SERPL-MCNC: 99 MG/DL
WBC # BLD AUTO: 6.2 10E3/UL (ref 4–11)

## 2023-11-06 PROCEDURE — G0103 PSA SCREENING: HCPCS | Performed by: STUDENT IN AN ORGANIZED HEALTH CARE EDUCATION/TRAINING PROGRAM

## 2023-11-06 PROCEDURE — 99214 OFFICE O/P EST MOD 30 MIN: CPT | Mod: 25 | Performed by: STUDENT IN AN ORGANIZED HEALTH CARE EDUCATION/TRAINING PROGRAM

## 2023-11-06 PROCEDURE — 69209 REMOVE IMPACTED EAR WAX UNI: CPT | Mod: LT | Performed by: STUDENT IN AN ORGANIZED HEALTH CARE EDUCATION/TRAINING PROGRAM

## 2023-11-06 PROCEDURE — 99396 PREV VISIT EST AGE 40-64: CPT | Mod: 25 | Performed by: STUDENT IN AN ORGANIZED HEALTH CARE EDUCATION/TRAINING PROGRAM

## 2023-11-06 PROCEDURE — 83036 HEMOGLOBIN GLYCOSYLATED A1C: CPT | Performed by: STUDENT IN AN ORGANIZED HEALTH CARE EDUCATION/TRAINING PROGRAM

## 2023-11-06 PROCEDURE — 82043 UR ALBUMIN QUANTITATIVE: CPT | Performed by: STUDENT IN AN ORGANIZED HEALTH CARE EDUCATION/TRAINING PROGRAM

## 2023-11-06 PROCEDURE — 36415 COLL VENOUS BLD VENIPUNCTURE: CPT | Performed by: STUDENT IN AN ORGANIZED HEALTH CARE EDUCATION/TRAINING PROGRAM

## 2023-11-06 PROCEDURE — 82570 ASSAY OF URINE CREATININE: CPT | Performed by: STUDENT IN AN ORGANIZED HEALTH CARE EDUCATION/TRAINING PROGRAM

## 2023-11-06 PROCEDURE — 80061 LIPID PANEL: CPT | Performed by: STUDENT IN AN ORGANIZED HEALTH CARE EDUCATION/TRAINING PROGRAM

## 2023-11-06 PROCEDURE — 85027 COMPLETE CBC AUTOMATED: CPT | Performed by: STUDENT IN AN ORGANIZED HEALTH CARE EDUCATION/TRAINING PROGRAM

## 2023-11-06 PROCEDURE — 80053 COMPREHEN METABOLIC PANEL: CPT | Performed by: STUDENT IN AN ORGANIZED HEALTH CARE EDUCATION/TRAINING PROGRAM

## 2023-11-06 RX ORDER — METOPROLOL SUCCINATE 50 MG/1
50 TABLET, EXTENDED RELEASE ORAL DAILY
Qty: 90 TABLET | Refills: 3 | Status: SHIPPED | OUTPATIENT
Start: 2023-11-06

## 2023-11-06 RX ORDER — LOSARTAN POTASSIUM 25 MG/1
25 TABLET ORAL DAILY
Qty: 90 TABLET | Refills: 3 | Status: SHIPPED | OUTPATIENT
Start: 2023-11-06

## 2023-11-06 ASSESSMENT — ENCOUNTER SYMPTOMS
DIARRHEA: 1
COUGH: 1
HEADACHES: 1
NERVOUS/ANXIOUS: 1
ARTHRALGIAS: 1
SORE THROAT: 0
PARESTHESIAS: 0
NAUSEA: 1
EYE PAIN: 0
CONSTIPATION: 0
BACK PAIN: 1
ABDOMINAL PAIN: 1
DYSURIA: 0
FEVER: 0
DIZZINESS: 1
SHORTNESS OF BREATH: 0
WEAKNESS: 0
PALPITATIONS: 1
JOINT SWELLING: 0
MYALGIAS: 0
FREQUENCY: 0
HEMATURIA: 0
CHILLS: 0
HEMATOCHEZIA: 0
HEARTBURN: 1

## 2023-11-06 ASSESSMENT — PAIN SCALES - GENERAL: PAINLEVEL: MILD PAIN (3)

## 2023-11-06 NOTE — PATIENT INSTRUCTIONS
Schedule gallbladder US and testicle US  Labs today    Dr. Auguste    Preventive Health Recommendations  Male Ages 50 - 64    Yearly exam:             See your health care provider every year in order to  o   Review health changes.   o   Discuss preventive care.    o   Review your medicines if your doctor has prescribed any.   Have a cholesterol test every 5 years, or more frequently if you are at risk for high cholesterol/heart disease.   Have a diabetes test (fasting glucose) every three years. If you are at risk for diabetes, you should have this test more often.   Have a colonoscopy at age 45, or have a yearly FIT test (stool test). These exams will check for colon cancer.    Talk with your health care provider about whether or not a prostate cancer screening test (PSA) is right for you.  You should be tested each year for STDs (sexually transmitted diseases), if you re at risk.     Shots: Get a flu shot each year. Get a tetanus shot every 10 years.     Nutrition:  Eat at least 5 servings of fruits and vegetables daily.   Eat whole-grain bread, whole-wheat pasta and brown rice instead of white grains and rice.   Get adequate Calcium and Vitamin D.     Lifestyle  Exercise for at least 150 minutes a week (30 minutes a day, 5 days a week). This will help you control your weight and prevent disease.   Limit alcohol to one drink per day.   No smoking.   Wear sunscreen to prevent skin cancer.   See your dentist every six months for an exam and cleaning.   See your eye doctor every 1 to 2 years.

## 2023-11-06 NOTE — PROGRESS NOTES
SUBJECTIVE:   CC: Manjinder is an 58 year old who presents for preventative health visit.     Patient presents with:  Physical  Pain: Pelvic pain 3/10, started 1 month ago, intermittently, lump felt on testicle  Back Pain: Lower  Musculoskeletal Problem: Pain in jaw, back, shoulders, has seen cardiology        11/6/2023     3:27 PM   Additional Questions   Roomed by Shantel CARMICHAEL RN   Accompanied by None     Healthy Habits:     Getting at least 3 servings of Calcium per day:  Yes    Bi-annual eye exam:  Yes    Dental care twice a year:  Yes    Sleep apnea or symptoms of sleep apnea:  Daytime drowsiness, Excessive snoring and Sleep apnea    Diet:  Regular (no restrictions)    Frequency of exercise:  4-5 days/week    Duration of exercise:  30-45 minutes    Taking medications regularly:  Yes    Medication side effects:  Not applicable    Additional concerns today:  Yes    Work- geriatric practice  Diet-overall well, cutting out sugar, more K over salt  Exercise-aerobics, hard to run due to knee pain, yoga    Prostate cancer screening-DUE  Colon cancer screening-due 2027    HTN  -losartan 25mg daily    Paroxysmal afib  -aspirin  -metoprolol 50mg daily  -no recent arrhythmias    Pulm nodule  -Unchanged 3 mm right middle lobe pulmonary nodule, not  requiring routine follow-up.    CAIO    Alcohol use  -improved/less use    ED  -sildenafil (has not needed)    ____    MSK pain  -pain between shoulder blades x 2-3 years  -intermittent  -radiates to chest, pain in jaw  -has seen cardiology-workup negative (though MSK)  -tender with palpation  -used to have numbness in L arm, resolved  -L shoulder pain with sleeping    Pelvis pain  Dull ache  Testicle lump, squishy/fluid, pain in groin radiates upwards  L testicle tenderness  Thinks has had this before  Doing some lifting/weights  Noticed 1 month ago    Had flu and covid on 11/1 vaccine    Social History     Tobacco Use    Smoking status: Never     Passive exposure: Never     Smokeless tobacco: Never   Substance Use Topics    Alcohol use: Yes     Alcohol/week: 6.0 - 9.0 standard drinks of alcohol     Types: 6 - 9 Cans of beer per week     Comment: 10-14 drinks a week            11/6/2023     7:54 AM   Alcohol Use   Prescreen: >3 drinks/day or >7 drinks/week? Yes   AUDIT SCORE  6         11/6/2023     7:54 AM   AUDIT - Alcohol Use Disorders Identification Test - Reproduced from the World Health Organization Audit 2001 (Second Edition)   1.  How often do you have a drink containing alcohol? 2 to 3 times a week   2.  How many drinks containing alcohol do you have on a typical day when you are drinking? 3 or 4   3.  How often do you have five or more drinks on one occasion? Monthly   4.  How often during the last year have you found that you were not able to stop drinking once you had started? Never   5.  How often during the last year have you failed to do what was normally expected of you because of drinking? Never   6.  How often during the last year have you needed a first drink in the morning to get yourself going after a heavy drinking session? Never   7.  How often during the last year have you had a feeling of guilt or remorse after drinking? Never   8.  How often during the last year have you been unable to remember what happened the night before because of your drinking? Never   9.  Have you or someone else been injured because of your drinking? No   10. Has a relative, friend, doctor or other health care worker been concerned about your drinking or suggested you cut down? No   TOTAL SCORE 6       Last PSA:   PSA   Date Value Ref Range Status   09/09/2020 1.25 0 - 4 ug/L Final     Comment:     Assay Method:  Chemiluminescence using Siemens Vista analyzer     Prostate Specific Antigen Screen   Date Value Ref Range Status   10/03/2022 1.81 0.00 - 4.00 ug/L Final       Reviewed orders with patient. Reviewed health maintenance and updated orders accordingly - Yes      Reviewed and  "updated as needed this visit by clinical staff   Tobacco  Allergies  Meds              Reviewed and updated as needed this visit by Provider     Meds             Past Medical History:   Diagnosis Date    Atrial fibrillation (H)     Gastroesophageal reflux disease     Hypertension       Past Surgical History:   Procedure Laterality Date    ANESTHESIA CARDIOVERSION N/A 6/29/2018    Procedure: ANESTHESIA CARDIOVERSION;  Cardioversion;  Surgeon: GENERIC ANESTHESIA PROVIDER;  Location: [facility] OR    COLONOSCOPY      COLONOSCOPY N/A 3/11/2022    Procedure: COLONOSCOPY;  Surgeon: Josh Villalobos MD;  Location:  GI    SURGICAL HISTORY OF -       animal bone graft in the left lower jaw       Review of Systems   Constitutional:  Negative for chills and fever.   HENT:  Positive for hearing loss. Negative for congestion, ear pain and sore throat.    Eyes:  Negative for pain and visual disturbance.   Respiratory:  Positive for cough. Negative for shortness of breath.    Cardiovascular:  Positive for chest pain and palpitations. Negative for peripheral edema.   Gastrointestinal:  Positive for abdominal pain, diarrhea, heartburn and nausea. Negative for constipation and hematochezia.   Genitourinary:  Negative for dysuria, frequency, genital sores, hematuria, impotence, penile discharge and urgency.   Musculoskeletal:  Positive for arthralgias and back pain. Negative for joint swelling and myalgias.   Skin:  Negative for rash.   Neurological:  Positive for dizziness and headaches. Negative for weakness and paresthesias.   Psychiatric/Behavioral:  Negative for mood changes. The patient is nervous/anxious.        OBJECTIVE:   /82 (BP Location: Right arm, Patient Position: Sitting, Cuff Size: Adult Regular)   Pulse 61   Temp 97.6  F (36.4  C) (Temporal)   Resp 18   Ht 1.815 m (5' 11.46\")   Wt 88.8 kg (195 lb 12.8 oz)   SpO2 100%   BMI 26.96 kg/m      Physical Exam      ASSESSMENT/PLAN:   Manjinder was seen today " for physical, pain, back pain and musculoskeletal problem.    Routine general medical examination at a health care facility  -Vitals: WNL  -Labs: lipid, A1c, PSA, cmp, cbc, microalbumin  -Immunizations: UTD (had flu and covid 11/1 at [facility])  -Colon cancer screening: due 2027  -Prostate cancer screening: PSA ordered  -Exercise: weights, walking, aerobics, yoga  -Diet: well balanced    Screening for lipid disorders  -     Lipid panel; Future    Screening for diabetes mellitus  -     Hemoglobin A1c; Future    Screening for prostate cancer  -     PSA, screen; Future    Benign essential hypertension  BP at goal <140/90. Continue losartan 25mg and metoprolol 50mg daily. Labs ordered below.  -     Comprehensive metabolic panel; Future  -     CBC with platelets; Future  -     Albumin Random Urine Quantitative with Creat Ratio; Future  -     losartan (COZAAR) 25 MG tablet; Take 1 tablet (25 mg) by mouth daily  -     metoprolol succinate ER (TOPROL XL) 50 MG 24 hr tablet; Take 1 tablet (50 mg) by mouth daily    Paroxysmal supraventricular tachycardia  Paroxysmal A-fib (H)  Stable on metoprpolol and baby aspirin.    Pain in left testicle  Testicle lump  Noticed intermittent pain in L testicle. Felt a soft/squishy lump yesterday, now resolved. Testicle exam normal-no varicocele or hernia felt. Will get US to evaluate further.  -     US Testicular & Scrotum w Doppler Ltd; Future    Family history of gallstones  Nausea  Shoulder blade pain  Jaw pain  Pain in between shoulder blade, radiates to chest/jaw. Tender to touch. Has had thorough workup by cardiology-thought to be non-cardiac in nature. Does get nausea frequently-has a family history of gallstones. Will get RUQ US and check CMP today to evaluate for gallstone pathology. Could be having referred gallbladder pain. If normal, likely MSK-recommend avoiding chest/upper back exercises that flare the pain.  -     US Abdomen Limited; Future    Excessive cerumen in ear canal,  "left  S/p ear wash.    Pulmonary nodule, 2mm, RML  No further monitoring needed.      Other orders  -     REVIEW OF HEALTH MAINTENANCE PROTOCOL ORDERS  -     PRIMARY CARE FOLLOW-UP SCHEDULING; Future      COUNSELING:   Reviewed preventive health counseling, as reflected in patient instructions      BMI:   Estimated body mass index is 26.96 kg/m  as calculated from the following:    Height as of this encounter: 1.815 m (5' 11.46\").    Weight as of this encounter: 88.8 kg (195 lb 12.8 oz).   Weight management plan: Discussed healthy diet and exercise guidelines      He reports that he has never smoked. He has never been exposed to tobacco smoke. He has never used smokeless tobacco.        Yoni Auguste, DO  [facility]    "

## 2023-11-06 NOTE — NURSING NOTE
RN ear assessment completed prior to ear irrigation. Otoscopic exam reveals cerumen present and irrigation advised. Post Ear Irrigation exam completed and cerumen plug removed, tympanic membrane intact, and no bleeding noted.  Pain assessment completed.     Patient tolerated procedure:  yes    Shantel Salas RN  [facility]

## 2023-11-08 ENCOUNTER — MYC MEDICAL ADVICE (OUTPATIENT)
Dept: FAMILY MEDICINE | Facility: CLINIC | Age: 58
End: 2023-11-08
Payer: COMMERCIAL

## 2023-11-09 NOTE — TELEPHONE ENCOUNTER
"Dr. Auguste - pt inquiring about next due colonoscopy d/t family hx. Shows due in 2027 per health maintenance. Also, pt would like to try conservative dietary and lifestyle changes for cholesterol management for now.    \"Good morning Molina Pelaez -     Thanks so much for your quick review of results.  As to your question about cholesterol medicine I would rather continue working on my diet to attack the LDL levels then take another pill at this time.  Agreed lets monitor check again in six months or so.     Also, I have not yet heard from the ultra sound scheduling how long should I wait before reaching out?       Finally, we didn't talk much about my family history as mother had Colon cancer and wondering when my next suggested colonoscopy should be given I may be at higher risk.  Any thoughts there would be appreciated.     Look forward to working together.\"    Writer replied to patient via [employer].    MATT GoldN, RN  [facility]    "

## 2023-11-13 ENCOUNTER — HOSPITAL ENCOUNTER (OUTPATIENT)
Dept: ULTRASOUND IMAGING | Facility: CLINIC | Age: 58
Discharge: HOME | End: 2023-11-13
Attending: STUDENT IN AN ORGANIZED HEALTH CARE EDUCATION/TRAINING PROGRAM
Payer: COMMERCIAL

## 2023-11-13 DIAGNOSIS — N50.812 PAIN IN LEFT TESTICLE: ICD-10-CM

## 2023-11-13 DIAGNOSIS — N50.89 TESTICLE LUMP: ICD-10-CM

## 2023-11-13 DIAGNOSIS — R11.0 NAUSEA: ICD-10-CM

## 2023-11-13 PROBLEM — I86.1 VARICOCELE: Status: ACTIVE | Noted: 2023-11-13

## 2023-11-13 PROBLEM — N50.3 CYST OF EPIDIDYMIS: Status: ACTIVE | Noted: 2023-11-13

## 2023-11-13 PROCEDURE — 93976 VASCULAR STUDY: CPT

## 2023-11-13 PROCEDURE — 76705 ECHO EXAM OF ABDOMEN: CPT | Mod: XU

## 2024-06-10 NOTE — ADDENDUM NOTE
Addended by: MIGUEL ÁNGEL SWIFT on: 1/28/2021 02:18 PM     Modules accepted: Level of Service    
Ayaan Galindo MD

## 2024-06-23 ENCOUNTER — OFFICE VISIT (OUTPATIENT)
Dept: URGENT CARE | Facility: URGENT CARE | Age: 59
End: 2024-06-23
Payer: COMMERCIAL

## 2024-06-23 VITALS
HEART RATE: 67 BPM | SYSTOLIC BLOOD PRESSURE: 138 MMHG | RESPIRATION RATE: 16 BRPM | OXYGEN SATURATION: 98 % | TEMPERATURE: 97.5 F | DIASTOLIC BLOOD PRESSURE: 90 MMHG | BODY MASS INDEX: 25.47 KG/M2 | WEIGHT: 185 LBS

## 2024-06-23 DIAGNOSIS — H57.89 REDNESS OF EYE, LEFT: Primary | ICD-10-CM

## 2024-06-23 DIAGNOSIS — H11.422 CHEMOSIS OF LEFT CONJUNCTIVA: ICD-10-CM

## 2024-06-23 PROCEDURE — 99213 OFFICE O/P EST LOW 20 MIN: CPT | Performed by: FAMILY MEDICINE

## 2024-06-23 RX ORDER — OFLOXACIN 3 MG/ML
1-2 SOLUTION/ DROPS OPHTHALMIC 4 TIMES DAILY
Qty: 5 ML | Refills: 1 | Status: SHIPPED | OUTPATIENT
Start: 2024-06-23

## 2024-06-23 NOTE — PATIENT INSTRUCTIONS
Take cetirizine once daily as an antihistamine  - [employer] sells a pack called 'Allertec' with a green top    For eye itching/allergies of the eye when you have a flare up try zaditor (over the counter eye drops)     For this healing period with the eye irritation and minor swelling of the left side use artifical tears/lubricating eye drops 2-3 times a day  -Also Avoid using contacts for the next week        If you have increasing eye discharge ( yellow /green in color) then start the ofloxacin antibiotic eye drops

## 2024-06-23 NOTE — PROGRESS NOTES
Assessment & Plan     Redness of eye, left  - ofloxacin (OCUFLOX) 0.3 % ophthalmic solution  Dispense: 5 mL; Refill: 1    Chemosis of left conjunctiva     Initial impression is that of allergies which instigated him to rub his eyes the lateral left portion of his eye shows a minor swelling of the surface consistent with chemosis with possible early subconjunctival hemorrhage we discussed this is benign in nature and should resolve over time if we can avoid rubbing or pressure.    Has had a little bit of mattering of the eye this morning if this were to persist or worsen he has a written prescription for ofloxacin which he can use.  He is advised to avoid using his eye contacts for the next week.    For his baseline seasonal allergies I recommend he start taking cetirizine.    If eye itching returns can use over-the-counter Zaditor.    May be helpful to use artificial tears for the next several days or a week for minor eye irritation    Bryan Serrato MD   [facility]    Andreia Dunbar is a 59 year old male who presents to clinic today for the following health issues:  Chief Complaint   Patient presents with    Urgent Care     Allergic reaction, started yesterday. [place].      HPI  Patient reports that he does have seasonal allergies from time to time he will use Benadryl for flareups yesterday he was working in the weeds at his home when he felt eye itching and irritation he did rub at his eye and now wakes up this morning to what appears to be irritation and swelling on the lateral portion of his left eye.  There is a small amount of mattering this morning he is choosing to wear his eyeglasses instead of his contact lenses.     NO exposures to pink eye. No cough/runny nose leading up to the event from cold symptoms    No hx of eye allergies    No foreign bodies of eye. Itching has mostly improved.     Patient Active Problem List    Diagnosis Date  Noted    Varicocele 11/13/2023     Priority: Medium    Cyst of epididymis 11/13/2023     Priority: Medium    Family history of gallstones 11/06/2023     Priority: Medium    Imbalance 10/03/2022     Priority: Medium    Sensorineural hearing loss (SNHL), unspecified laterality 10/03/2022     Priority: Medium    Tinnitus, bilateral 10/03/2022     Priority: Medium    TMJ (temporomandibular joint syndrome) 10/03/2022     Priority: Medium    DDD (degenerative disc disease), cervical 10/03/2022     Priority: Medium    Erectile dysfunction, unspecified erectile dysfunction type 10/03/2022     Priority: Medium    Family history of colon cancer 10/03/2022     Priority: Medium    CAIO (obstructive sleep apnea) 09/16/2021     Priority: Medium    Dizziness 09/16/2021     Priority: Medium    Numbness and tingling in left arm 09/16/2021     Priority: Medium    Pulmonary nodule, 2mm, RML 07/19/2018     Priority: Medium     2mm, RML      Benign lipomatous neoplasm of skin and subcutaneous tissue of trunk 07/19/2018     Priority: Medium    Paroxysmal A-fib (H) 06/28/2018     Priority: Medium    Benign essential hypertension 11/07/2016     Priority: Medium       Current Outpatient Medications   Medication Sig Dispense Refill    aspirin (ASA) 81 MG chewable tablet Take 81 mg by mouth daily      cholecalciferol 25 MCG (1000 UT) TABS Take 25 mcg by mouth      losartan (COZAAR) 25 MG tablet Take 1 tablet (25 mg) by mouth daily 90 tablet 3    metoprolol succinate ER (TOPROL XL) 50 MG 24 hr tablet Take 1 tablet (50 mg) by mouth daily 90 tablet 3    ofloxacin (OCUFLOX) 0.3 % ophthalmic solution Place 1-2 drops Into the left eye 4 times daily 5 mL 1    sildenafil (VIAGRA) 25 MG tablet TAKE 2 TABLET BY MOUTH AS NEEDED. TAKE 1 HOURS PRIOR TO SEXUAL ACTIVITY (Patient not taking: Reported on 11/6/2023)       No current facility-administered medications for this visit.           Objective    BP (!) 138/90   Pulse 67   Temp 97.5  F (36.4  C)  (Temporal)   Resp 16   Wt 83.9 kg (185 lb)   SpO2 98%   BMI 25.47 kg/m    Physical Exam       Eyes: swelling of surface of eye L lateral, minor eye redness of right eye. No current discharge, PERRL, no blood in pupil.   No results found for any visits on 06/23/24.                  The use of Dragon/dilitronicsation services may have been used to construct the content in this note; any grammatical or spelling errors are non-intentional. Please contact the author of this note directly if you are in need of any clarification.

## 2024-11-04 NOTE — PATIENT INSTRUCTIONS
Patient Education       Thank you for coming to see me today! Here are a couple of pieces of information about my schedule and communication practices.     I am not in the clinic on Tuesdays. Non-urgent calls and messages received on Tuesdays will be addressed as soon as I am able when I am back in the office on the next business day. Urgent calls will be addressed by a covering clinician.       If lab work was done today as part of your evaluation you will generally be contacted via [employer], mail, or phone with the results within 7-10 days.  If there is an alarming/concerning result we will contact you by phone. Lab results come back at varying times, I generally wait until all labs are resulted before making comments on results. Please note, labs are automatically released to [employer] once available, but it may take a couple of days for my interpretation note to appear.      I try very hard to respond to medical messages with 2 business days of receiving them. Occasionally it takes me longer if I am trying to figure out the best way to respond and need to seek guidance, do some research or dig deeper into your medical history to come up with a helpful response.      If you need refills please contact your pharmacist. They will send a refill request to me to review. Please allow 3-5 business days for us to respond to all refill requests.      Please call or send a medical message with any questions or concerns. Thank you for trusting me to be part of your healthcare team!        Dr. Yoni Auguste    Preventive Care Advice   This is general advice given by our system to help you stay healthy. However, your care team may have specific advice just for you. Please talk to your care team about your preventive care needs.  Nutrition  Eat 5 or more servings of fruits and vegetables each day.  Try wheat bread, brown rice and whole grain pasta (instead of white bread, rice, and pasta).  Get enough calcium and vitamin D. Check  the label on foods and aim for 100% of the RDA (recommended daily allowance).  Lifestyle  Exercise at least 150 minutes each week  (30 minutes a day, 5 days a week).  Do muscle strengthening activities 2 days a week. These help control your weight and prevent disease.  No smoking.  Wear sunscreen to prevent skin cancer.  Have a dental exam and cleaning every 6 months.  Yearly exams  See your health care team every year to talk about:  Any changes in your health.  Any medicines your care team has prescribed.  Preventive care, family planning, and ways to prevent chronic diseases.  Shots (vaccines)   HPV shots (up to age 26), if you've never had them before.  Hepatitis B shots (up to age 59), if you've never had them before.  COVID-19 shot: Get this shot when it's due.  Flu shot: Get a flu shot every year.  Tetanus shot: Get a tetanus shot every 10 years.  Pneumococcal, hepatitis A, and RSV shots: Ask your care team if you need these based on your risk.  Shingles shot (for age 50 and up)  General health tests  Diabetes screening:  Starting at age 35, Get screened for diabetes at least every 3 years.  If you are younger than age 35, ask your care team if you should be screened for diabetes.  Cholesterol test: At age 39, start having a cholesterol test every 5 years, or more often if advised.  Bone density scan (DEXA): At age 50, ask your care team if you should have this scan for osteoporosis (brittle bones).  Hepatitis C: Get tested at least once in your life.  STIs (sexually transmitted infections)  Before age 24: Ask your care team if you should be screened for STIs.  After age 24: Get screened for STIs if you're at risk. You are at risk for STIs (including HIV) if:  You are sexually active with more than one person.  You don't use condoms every time.  You or a partner was diagnosed with a sexually transmitted infection.  If you are at risk for HIV, ask about PrEP medicine to prevent HIV.  Get tested for HIV at  least once in your life, whether you are at risk for HIV or not.  Cancer screening tests  Cervical cancer screening: If you have a cervix, begin getting regular cervical cancer screening tests starting at age 21.  Breast cancer scan (mammogram): If you've ever had breasts, begin having regular mammograms starting at age 40. This is a scan to check for breast cancer.  Colon cancer screening: It is important to start screening for colon cancer at age 45.  Have a colonoscopy test every 10 years (or more often if you're at risk) Or, ask your provider about stool tests like a FIT test every year or Cologuard test every 3 years.  To learn more about your testing options, visit:   .  For help making a decision, visit:   https://bit.ly/dq15923.  Prostate cancer screening test: If you have a prostate, ask your care team if a prostate cancer screening test (PSA) at age 55 is right for you.  Lung cancer screening: If you are a current or former smoker ages 50 to 80, ask your care team if ongoing lung cancer screenings are right for you.  For informational purposes only. Not to replace the advice of your health care provider. Copyright   2023 [facility] [employer]. All rights reserved. Clinically reviewed by the [facility]. [employer] [id number] - REV 01/24.  Learning About Stress  What is stress?     Stress is your body's response to a hard situation. Your body can have a physical, emotional, or mental response. Stress is a fact of life for most people, and it affects everyone differently. What causes stress for you may not be stressful for someone else.  A lot of things can cause stress. You may feel stress when you go on a job interview, take a test, or run a race. This kind of short-term stress is normal and even useful. It can help you if you need to work hard or react quickly. For example, stress can help you finish an important job on time.  Long-term stress is caused by ongoing stressful  situations or events. Examples of long-term stress include long-term health problems, ongoing problems at work, or conflicts in your family. Long-term stress can harm your health.  How does stress affect your health?  When you are stressed, your body responds as though you are in danger. It makes hormones that speed up your heart, make you breathe faster, and give you a burst of energy. This is called the fight-or-flight stress response. If the stress is over quickly, your body goes back to normal and no harm is done.  But if stress happens too often or lasts too long, it can have bad effects. Long-term stress can make you more likely to get sick, and it can make symptoms of some diseases worse. If you tense up when you are stressed, you may develop neck, shoulder, or low back pain. Stress is linked to high blood pressure and heart disease.  Stress also harms your emotional health. It can make you skelton, tense, or depressed. Your relationships may suffer, and you may not do well at work or school.  What can you do to manage stress?  You can try these things to help manage stress:   Do something active. Exercise or activity can help reduce stress. Walking is a great way to get started. Even everyday activities such as housecleaning or yard work can help.  Try yoga or darren chi. These techniques combine exercise and meditation. You may need some training at first to learn them.  Do something you enjoy. For example, listen to music or go to a movie. Practice your hobby or do volunteer work.  Meditate. This can help you relax, because you are not worrying about what happened before or what may happen in the future.  Do guided imagery. Imagine yourself in any setting that helps you feel calm. You can use online videos, books, or a teacher to guide you.  Do breathing exercises. For example:  From a standing position, bend forward from the waist with your knees slightly bent. Let your arms dangle close to the floor.  Breathe  "in slowly and deeply as you return to a standing position. Roll up slowly and lift your head last.  Hold your breath for just a few seconds in the standing position.  Breathe out slowly and bend forward from the waist.  Let your feelings out. Talk, laugh, cry, and express anger when you need to. Talking with supportive friends or family, a counselor, or a matilde leader about your feelings is a healthy way to relieve stress. Avoid discussing your feelings with people who make you feel worse.  Write. It may help to write about things that are bothering you. This helps you find out how much stress you feel and what is causing it. When you know this, you can find better ways to cope.  What can you do to prevent stress?  You might try some of these things to help prevent stress:  Manage your time. This helps you find time to do the things you want and need to do.  Get enough sleep. Your body recovers from the stresses of the day while you are sleeping.  Get support. Your family, friends, and community can make a difference in how you experience stress.  Limit your news feed. Avoid or limit time on social media or news that may make you feel stressed.  Do something active. Exercise or activity can help reduce stress. Walking is a great way to get started.  Where can you learn more?  Go to https://www.zahnarztzentrum.ch.net/patiented  Enter N032 in the search box to learn more about \"Learning About Stress.\"  Current as of: October 24, 2023  Content Version: 14.2 2024 [employer].   Care instructions adapted under license by your healthcare professional. If you have questions about a medical condition or this instruction, always ask your healthcare professional. [employer] disclaims any warranty or liability for your use of this information.    9 Ways to Cut Back on Drinking  Maybe you've found yourself drinking more alcohol than you'd prefer. If you want to cut back, here are some ideas to try.    Think " "before you drink.  Do you really want a drink, or is it just a habit? If you're used to having a drink at a certain time, try doing something else then.     Look for substitutes.  Find some no-alcohol drinks that you enjoy, like flavored seltzer water, tea with honey, or tonic with a slice of lime. Or try alcohol-free beer or \"virgin\" cocktails (without the alcohol).     Drink more water.  Use water to quench your thirst. Drink a glass of water before you have any alcohol. Have another glass along with every drink or between drinks.     Shrink your drink.  For example, have a bottle of beer instead of a pint. Use a smaller glass for wine. Choose drinks with lower alcohol content (ABV%). Or use less liquor and more mixer in cocktails.     Slow down.  It's easy to drink quickly and without thinking about it. Pay attention, and make each drink last longer.     Do the math.  Total up how much you spend on alcohol each month. How much is that a year? If you cut back, what could you do with the money you save?     Take a break.  Choose a day or two each week when you won't drink at all. Notice how you feel on those days, physically and emotionally. How did you sleep? Do you feel better? Over time, add more break days.     Count calories.  Would you like to lose some weight? For some people that's a good motivator for cutting back. Figure out how many calories are in each drink. How many does that add up to in a day? In a week? In a month?     Practice saying no.  Be ready when someone offers you a drink. Try: \"Thanks, I've had enough.\" Or \"Thanks, but I'm cutting back.\" Or \"No, thanks. I feel better when I drink less.\"   Current as of: November 15, 2023  Content Version: 14.2 2024 [employer] [facility] [employer].   Care instructions adapted under license by your healthcare professional. If you have questions about a medical condition or this instruction, always ask your healthcare professional. Healthwise[employer] Incorporated " disclaims any warranty or liability for your use of this information.

## 2024-11-06 ENCOUNTER — OFFICE VISIT (OUTPATIENT)
Dept: FAMILY MEDICINE | Facility: CLINIC | Age: 59
End: 2024-11-06
Attending: STUDENT IN AN ORGANIZED HEALTH CARE EDUCATION/TRAINING PROGRAM
Payer: COMMERCIAL

## 2024-11-06 VITALS
HEART RATE: 66 BPM | OXYGEN SATURATION: 97 % | TEMPERATURE: 97.9 F | HEIGHT: 71 IN | WEIGHT: 188.9 LBS | BODY MASS INDEX: 26.44 KG/M2 | DIASTOLIC BLOOD PRESSURE: 74 MMHG | SYSTOLIC BLOOD PRESSURE: 123 MMHG | RESPIRATION RATE: 18 BRPM

## 2024-11-06 DIAGNOSIS — Z13.1 SCREENING FOR DIABETES MELLITUS: ICD-10-CM

## 2024-11-06 DIAGNOSIS — Z78.9 HEPATITIS B VACCINATION STATUS UNKNOWN: ICD-10-CM

## 2024-11-06 DIAGNOSIS — I10 BENIGN ESSENTIAL HYPERTENSION: ICD-10-CM

## 2024-11-06 DIAGNOSIS — I48.0 PAROXYSMAL ATRIAL FIBRILLATION (H): ICD-10-CM

## 2024-11-06 DIAGNOSIS — Z12.5 SCREENING FOR PROSTATE CANCER: ICD-10-CM

## 2024-11-06 DIAGNOSIS — Z00.00 ROUTINE GENERAL MEDICAL EXAMINATION AT A HEALTH CARE FACILITY: Primary | ICD-10-CM

## 2024-11-06 DIAGNOSIS — Z80.42 FAMILY HISTORY OF PROSTATE CANCER: ICD-10-CM

## 2024-11-06 DIAGNOSIS — R91.8 PULMONARY NODULES: ICD-10-CM

## 2024-11-06 DIAGNOSIS — I47.10 PAROXYSMAL SUPRAVENTRICULAR TACHYCARDIA (H): ICD-10-CM

## 2024-11-06 DIAGNOSIS — Z13.220 SCREENING FOR LIPID DISORDERS: ICD-10-CM

## 2024-11-06 LAB
ANION GAP SERPL CALCULATED.3IONS-SCNC: 10 MMOL/L (ref 7–15)
BUN SERPL-MCNC: 20.9 MG/DL (ref 6–20)
CALCIUM SERPL-MCNC: 9.4 MG/DL (ref 8.8–10.4)
CHLORIDE SERPL-SCNC: 106 MMOL/L (ref 98–107)
CHOLEST SERPL-MCNC: 155 MG/DL
CREAT SERPL-MCNC: 1.25 MG/DL (ref 0.67–1.17)
CREAT UR-MCNC: 230 MG/DL
EGFRCR SERPLBLD CKD-EPI 2021: 67 ML/MIN/1.73M2
EST. AVERAGE GLUCOSE BLD GHB EST-MCNC: 105 MG/DL
FASTING STATUS PATIENT QL REPORTED: YES
FASTING STATUS PATIENT QL REPORTED: YES
GLUCOSE SERPL-MCNC: 114 MG/DL (ref 70–99)
HBA1C MFR BLD: 5.3 % (ref 0–5.6)
HBV SURFACE AB SERPL IA-ACNC: <3.5 M[IU]/ML
HBV SURFACE AB SERPL IA-ACNC: NONREACTIVE M[IU]/ML
HCO3 SERPL-SCNC: 25 MMOL/L (ref 22–29)
HDLC SERPL-MCNC: 39 MG/DL
LDLC SERPL CALC-MCNC: 96 MG/DL
MICROALBUMIN UR-MCNC: <12 MG/L
MICROALBUMIN/CREAT UR: NORMAL MG/G{CREAT}
NONHDLC SERPL-MCNC: 116 MG/DL
POTASSIUM SERPL-SCNC: 4.5 MMOL/L (ref 3.4–5.3)
PSA SERPL DL<=0.01 NG/ML-MCNC: 1.16 NG/ML (ref 0–3.5)
SODIUM SERPL-SCNC: 141 MMOL/L (ref 135–145)
TRIGL SERPL-MCNC: 100 MG/DL

## 2024-11-06 PROCEDURE — 80048 BASIC METABOLIC PNL TOTAL CA: CPT | Performed by: STUDENT IN AN ORGANIZED HEALTH CARE EDUCATION/TRAINING PROGRAM

## 2024-11-06 PROCEDURE — G0103 PSA SCREENING: HCPCS | Performed by: STUDENT IN AN ORGANIZED HEALTH CARE EDUCATION/TRAINING PROGRAM

## 2024-11-06 PROCEDURE — 82570 ASSAY OF URINE CREATININE: CPT | Performed by: STUDENT IN AN ORGANIZED HEALTH CARE EDUCATION/TRAINING PROGRAM

## 2024-11-06 PROCEDURE — 83036 HEMOGLOBIN GLYCOSYLATED A1C: CPT | Performed by: STUDENT IN AN ORGANIZED HEALTH CARE EDUCATION/TRAINING PROGRAM

## 2024-11-06 PROCEDURE — 99396 PREV VISIT EST AGE 40-64: CPT | Mod: 25 | Performed by: STUDENT IN AN ORGANIZED HEALTH CARE EDUCATION/TRAINING PROGRAM

## 2024-11-06 PROCEDURE — 80061 LIPID PANEL: CPT | Performed by: STUDENT IN AN ORGANIZED HEALTH CARE EDUCATION/TRAINING PROGRAM

## 2024-11-06 PROCEDURE — 86706 HEP B SURFACE ANTIBODY: CPT | Performed by: STUDENT IN AN ORGANIZED HEALTH CARE EDUCATION/TRAINING PROGRAM

## 2024-11-06 PROCEDURE — 99214 OFFICE O/P EST MOD 30 MIN: CPT | Mod: 25 | Performed by: STUDENT IN AN ORGANIZED HEALTH CARE EDUCATION/TRAINING PROGRAM

## 2024-11-06 PROCEDURE — 36415 COLL VENOUS BLD VENIPUNCTURE: CPT | Performed by: STUDENT IN AN ORGANIZED HEALTH CARE EDUCATION/TRAINING PROGRAM

## 2024-11-06 PROCEDURE — 82043 UR ALBUMIN QUANTITATIVE: CPT | Performed by: STUDENT IN AN ORGANIZED HEALTH CARE EDUCATION/TRAINING PROGRAM

## 2024-11-06 RX ORDER — METOPROLOL SUCCINATE 50 MG/1
50 TABLET, EXTENDED RELEASE ORAL DAILY
Qty: 90 TABLET | Refills: 4 | Status: SHIPPED | OUTPATIENT
Start: 2024-11-06

## 2024-11-06 RX ORDER — LOSARTAN POTASSIUM 25 MG/1
25 TABLET ORAL DAILY
Qty: 90 TABLET | Refills: 4 | Status: SHIPPED | OUTPATIENT
Start: 2024-11-06

## 2024-11-06 SDOH — HEALTH STABILITY: PHYSICAL HEALTH: ON AVERAGE, HOW MANY DAYS PER WEEK DO YOU ENGAGE IN MODERATE TO STRENUOUS EXERCISE (LIKE A BRISK WALK)?: 4 DAYS

## 2024-11-06 ASSESSMENT — PAIN SCALES - GENERAL: PAINLEVEL_OUTOF10: NO PAIN (0)

## 2024-11-06 ASSESSMENT — SOCIAL DETERMINANTS OF HEALTH (SDOH): HOW OFTEN DO YOU GET TOGETHER WITH FRIENDS OR RELATIVES?: ONCE A WEEK

## 2024-11-06 NOTE — PROGRESS NOTES
"Preventive Care Visit  [facility]  Yoni Auguste DO, Family Medicine  Nov 6, 2024    Assessment & Plan     Routine general medical examination at a health care facility  Family history of prostate cancer  -Vitals: WNL  -Labs: bmp, microalbumin, psa, A1c, lipids, hep b surface antibody  -Immunizations: will await hep b surface antibody results  -Colon cancer screening: due 2027  -Prostate cancer screening: due, psa ordered  -Exercise: weights  -Diet: good    Benign essential hypertension  BP at goal <140/90. Continue losartan 25mg and metoprolol ER 50mg daily. Labs below.  - Albumin Random Urine Quantitative with Creat Ratio  - Basic metabolic panel  - losartan (COZAAR) 25 MG tablet  Dispense: 90 tablet; Refill: 4  - metoprolol succinate ER (TOPROL XL) 50 MG 24 hr tablet  Dispense: 90 tablet; Refill: 4    Screening for lipid disorders  - Lipid panel    Screening for diabetes mellitus  - Hemoglobin A1c    Screening for prostate cancer  - PSA, screen    Pulmonary nodule, 2mm, RML  No further follow up needed.    Hepatitis B vaccination status unknown  - Hepatitis B Surface Antibody    Paroxysmal atrial fibrillation (H)  Paroxysmal supraventricular tachycardia (H)  Heart RRR today. Goes into Afib with exercise/weight lifting. Resolves quickly with rest. Continue metoprolol ER 50mg and aspirin 81mg. Follow up with cardiology as needed.      BMI  Estimated body mass index is 26.5 kg/m  as calculated from the following:    Height as of this encounter: 1.798 m (5' 10.8\").    Weight as of this encounter: 85.7 kg (188 lb 14.4 oz).   Weight management plan: Discussed healthy diet and exercise guidelines    Counseling  Appropriate preventive services were addressed with this patient via screening, questionnaire, or discussion as appropriate for fall prevention, nutrition, physical activity, Tobacco-use cessation, social engagement, weight loss and cognition.  Checklist reviewing preventive " services available has been given to the patient.  Reviewed patient's diet, addressing concerns and/or questions.   The patient reports drinking more than 3 alcoholic drinks per day and/or more than 7 drhnks per week. The patient was counseled and given information about possible harmful effects of excessive alcohol intake.      Andreia Dunbar is a 59 year old, presenting for the following:  Physical        11/6/2024     7:12 AM   Additional Questions   Roomed by Korin bajwa   Accompanied by self          HPI    Work- of physician mary  Prostate cancer screening-due  Colon cancer screening-due 2027    HTN  -losartan 25mg     Paroxysmal afib  -metoprolol ER 50mg  -aspirin 81mg  -worse with exercise    Pulm nodules  -Unchanged 3 mm right middle lobe pulmonary nodule, not  requiring routine follow-up.    Jaw pain  -worse with exercise  -talked to dentist and cardiologist      Health Care Directive  Patient does not have a Health Care Directive: Discussed advance care planning with patient; however, patient declined at this time.      11/6/2024   General Health   How would you rate your overall physical health? Good   Feel stress (tense, anxious, or unable to sleep) Rather much      (!) STRESS CONCERN      11/6/2024   Nutrition   Three or more servings of calcium each day? Yes   Diet: Low salt    Carbohydrate counting    Gluten-free/reduced   How many servings of fruit and vegetables per day? (!) 2-3   How many sweetened beverages each day? 0-1       Multiple values from one day are sorted in reverse-chronological order         11/6/2024   Exercise   Days per week of moderate/strenous exercise 4 days            11/6/2024   Social Factors   Frequency of gathering with friends or relatives Once a week   Worry food won't last until get money to buy more No   Food not last or not have enough money for food? No   Do you have housing? (Housing is defined as stable permanent housing and does not include staying ouside in  a car, in a tent, in an abandoned building, in an overnight shelter, or couch-surfing.) Yes   Are you worried about losing your housing? No   Lack of transportation? No   Unable to get utilities (heat,electricity)? No            11/6/2024   Fall Risk   Fallen 2 or more times in the past year? No    Trouble with walking or balance? No        Patient-reported          11/6/2024   Dental   Dentist two times every year? Yes            11/6/2024   TB Screening   Were you born outside of the [place]? No            Today's PHQ-2 Score:       11/6/2024     7:01 AM   PHQ-2 ( 1999 [employer])   Q1: Little interest or pleasure in doing things 0    Q2: Feeling down, depressed or hopeless 0    PHQ-2 Score 0    Q1: Little interest or pleasure in doing things Not at all   Q2: Feeling down, depressed or hopeless Not at all   PHQ-2 Score 0       Patient-reported           11/6/2024   Substance Use   Alcohol more than 3/day or more than 7/wk Yes   How often do you have a drink containing alcohol 2 to 3 times a week   How many alcohol drinks on typical day 3 or 4   How often do you have 5+ drinks at one occasion Monthly   Audit 2/3 Score 3   How often not able to stop drinking once started Never   How often failed to do what normally expected Never   How often needed first drink in am after a heavy drinking session Never   How often feeling of guilt or remorse after drinking Never   How often unable to remember what happened the night before Never   Have you or someone else been injured because of your drinking No   Has anyone been concerned or suggested you cut down on drinking No   TOTAL SCORE - AUDIT 6   Do you use any other substances recreationally? (!) CANNABIS PRODUCTS        Social History     Tobacco Use    Smoking status: Never     Passive exposure: Never    Smokeless tobacco: Never   Vaping Use    Vaping status: Never Used   Substance Use Topics    Alcohol use: Yes     Alcohol/week: 6.0 - 9.0 standard drinks of alcohol     Types: 6  "- 9 Cans of beer per week     Comment: 10-14 drinks a week     Drug use: No     Comment: gummie here and there           11/6/2024   STI Screening   New sexual partner(s) since last STI/HIV test? No      Last PSA:   PSA   Date Value Ref Range Status   09/09/2020 1.25 0 - 4 ug/L Final     Comment:     Assay Method:  Chemiluminescence using Siemens Vista analyzer     Prostate Specific Antigen Screen   Date Value Ref Range Status   11/06/2023 0.97 0.00 - 3.50 ng/mL Final   10/03/2022 1.81 0.00 - 4.00 ug/L Final     ASCVD Risk   The 10-year ASCVD risk score (Adelina CORADO, et al., 2019) is: 7.5%    Values used to calculate the score:      Age: 59 years      Sex: Male      Is Non- : No      Diabetic: No      Tobacco smoker: No      Systolic Blood Pressure: 123 mmHg      Is BP treated: Yes      HDL Cholesterol: 42 mg/dL      Total Cholesterol: 164 mg/dL      Reviewed and updated as needed this visit by Provider   Tobacco    Problems  Med Hx  Surg Hx  Fam Hx  Soc Hx Sexual Activity                 Objective    Exam  /74 (BP Location: Left arm, Patient Position: Sitting, Cuff Size: Adult Regular)   Pulse 66   Temp 97.9  F (36.6  C) (Temporal)   Resp 18   Ht 1.798 m (5' 10.8\")   Wt 85.7 kg (188 lb 14.4 oz)   SpO2 97%   BMI 26.50 kg/m     Estimated body mass index is 26.5 kg/m  as calculated from the following:    Height as of this encounter: 1.798 m (5' 10.8\").    Weight as of this encounter: 85.7 kg (188 lb 14.4 oz).    Physical Exam  Constitutional:       General: He is not in acute distress.     Appearance: He is well-developed.   HENT:      Head: Normocephalic and atraumatic.      Right Ear: Tympanic membrane, ear canal and external ear normal.      Left Ear: Tympanic membrane, ear canal and external ear normal.      Nose: Nose normal.      Mouth/Throat:      Mouth: Mucous membranes are moist.      Pharynx: Oropharynx is clear. No oropharyngeal exudate.   Eyes:      " Conjunctiva/sclera: Conjunctivae normal.      Pupils: Pupils are equal, round, and reactive to light.   Neck:      Thyroid: No thyroid mass, thyromegaly or thyroid tenderness.   Cardiovascular:      Rate and Rhythm: Normal rate and regular rhythm.      Heart sounds: Normal heart sounds. No murmur heard.  Pulmonary:      Effort: Pulmonary effort is normal.      Breath sounds: No wheezing or rales.   Abdominal:      General: Abdomen is flat.   Musculoskeletal:      Cervical back: Normal range of motion and neck supple. No rigidity or tenderness.   Lymphadenopathy:      Cervical: No cervical adenopathy.   Skin:     General: Skin is warm and dry.      Findings: No rash.   Neurological:      General: No focal deficit present.      Mental Status: He is alert and oriented to person, place, and time. Mental status is at baseline.   Psychiatric:         Mood and Affect: Mood normal.         Behavior: Behavior normal.         Thought Content: Thought content normal.           Signed Electronically by: Yoni Auguste DO

## 2024-11-07 PROBLEM — N18.2 CKD (CHRONIC KIDNEY DISEASE) STAGE 2, GFR 60-89 ML/MIN: Status: ACTIVE | Noted: 2024-11-07

## 2025-02-05 ENCOUNTER — MYC REFILL (OUTPATIENT)
Dept: FAMILY MEDICINE | Facility: CLINIC | Age: 60
End: 2025-02-05
Payer: COMMERCIAL

## 2025-02-05 DIAGNOSIS — I10 BENIGN ESSENTIAL HYPERTENSION: ICD-10-CM

## 2025-02-05 RX ORDER — METOPROLOL SUCCINATE 50 MG/1
50 TABLET, EXTENDED RELEASE ORAL DAILY
Qty: 90 TABLET | Refills: 0 | Status: SHIPPED | OUTPATIENT
Start: 2025-02-05

## 2025-02-19 ENCOUNTER — OFFICE VISIT (OUTPATIENT)
Dept: URGENT CARE | Facility: URGENT CARE | Age: 60
End: 2025-02-19
Payer: COMMERCIAL

## 2025-02-19 ENCOUNTER — ANCILLARY PROCEDURE (OUTPATIENT)
Dept: GENERAL RADIOLOGY | Facility: CLINIC | Age: 60
End: 2025-02-19
Attending: NURSE PRACTITIONER
Payer: COMMERCIAL

## 2025-02-19 ENCOUNTER — NURSE TRIAGE (OUTPATIENT)
Dept: FAMILY MEDICINE | Facility: CLINIC | Age: 60
End: 2025-02-19
Payer: COMMERCIAL

## 2025-02-19 VITALS
HEIGHT: 72 IN | WEIGHT: 200 LBS | HEART RATE: 70 BPM | TEMPERATURE: 97.6 F | DIASTOLIC BLOOD PRESSURE: 82 MMHG | RESPIRATION RATE: 16 BRPM | OXYGEN SATURATION: 100 % | SYSTOLIC BLOOD PRESSURE: 130 MMHG | BODY MASS INDEX: 27.09 KG/M2
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
## 2025-02-19 DIAGNOSIS — R07.81 RIB PAIN ON LEFT SIDE: Primary | ICD-10-CM

## 2025-02-19 PROCEDURE — 71101 X-RAY EXAM UNILAT RIBS/CHEST: CPT | Mod: TC | Performed by: STUDENT IN AN ORGANIZED HEALTH CARE EDUCATION/TRAINING PROGRAM

## 2025-02-19 PROCEDURE — 3075F SYST BP GE 130 - 139MM HG: CPT | Performed by: NURSE PRACTITIONER

## 2025-02-19 PROCEDURE — 99214 OFFICE O/P EST MOD 30 MIN: CPT | Performed by: NURSE PRACTITIONER

## 2025-02-19 PROCEDURE — 3079F DIAST BP 80-89 MM HG: CPT | Performed by: NURSE PRACTITIONER

## 2025-02-19 NOTE — PROGRESS NOTES
Chief Complaint   Patient presents with    Rib Injury     Fell playing broom ball @3 weeks ago, c/o left sided chest/rib pain         ICD-10-CM    1. Rib pain on left side  R07.81 XR Ribs & Chest Left G/E 3 Views      2. Fall on ice  W00.9XXA XR Ribs & Chest Left G/E 3 Views          Red flag warning signs and when to go to the emergency room discussed.  Reviewed potential adverse reactions to medications.    Xray - Reviewed and interpreted by me.  Chest x-ray and left rib detail appear normal, no evidence of acute fracture, no pneumothorax, infiltrate or effusion.    Subjective     Manjinder Esparza is an 59 year old male who presents to clinic today for pain in the left chest and ribs since 3 weeks ago.  He fell while playing broom ball and has had pain since that time.  He thought pain had been steadily improving but then today he started having sharp pains in the left anterior ribs.  Pain occurs with inspiration.    He denies any fever, chills, cough.      Objective    /82 (BP Location: Right arm, Patient Position: Chair, Cuff Size: Adult Regular)   Pulse 70   Temp 97.6  F (36.4  C) (Temporal)   Resp 16   Ht 1.829 m (6')   Wt 90.7 kg (200 lb)   SpO2 100%   BMI 27.12 kg/m    Nurses notes and VS have been reviewed.    Physical Exam       GENERAL APPEARANCE: healthy appearing, alert     NECK: no adenopathy or asymmetry, thyroid normal to palpation     RESP: lungs clear to auscultation - no rales, rhonchi or wheezes     CV: regular rates and rhythm, no murmurs, rubs, or gallop     MS: extremities normal- no gross deformities noted; normal muscle tone.  No palpable tenderness over the left anterior ribs.     SKIN: no suspicious lesions or rashes     NEURO: Normal strength and tone, mentation intact and speech normal    Lashanda Quiroga, APRN, CNP  [facility] Provider    The use of Dragon/TouchMail dictation services may have been used to construct the content in this note; any grammatical or  spelling errors are non-intentional. Please contact the author of this note directly if you are in need of any clarification.

## 2025-02-19 NOTE — TELEPHONE ENCOUNTER
Call received from patient:  Three weeks ago thinks broke rib  Feels like getting poked from inside  Duration: 2 days  Difficulty getting comfortable at night  Fell playing [employer]  Left side, right below pectoral muscle, also painful in back  No difficiulty breathing/taign deep breath  No bruising  No lacerations  Intermittent   No specific triggers of poking  Moderate and worsened today  Raised left arm helps pain    Writer offered appt with Dr. Moody this afternoon at 1420. Patient declined due to work schedule.  Patient plans to go to Urgent Care.      Writer informed patient, the [facility] is open today from 10:00 AM-8:00 PM.  No appointment necessary.  Please arrive by 6:00 PM as they often reach capacity by that time of day.    Patient verbalized understanding and in agreement with plan.    MATT MeierN, RN-[facility]    Reason for Disposition   Patient wants to be seen    Additional Information   Negative: Major injury from dangerous force or speed (e.g., MVA, fall > 10 feet or 3 meters)   Negative: Bullet wound, knife wound or other penetrating object   Negative: Puncture wound that sounds life-threatening to the triager   Negative: Severe difficulty breathing (e.g., struggling for each breath, speaks in single words)   Negative: Major bleeding (actively dripping or spurting) that can't be stopped   Negative: Open wound of the chest with sound of moving air (sucking wound) or visible air bubbles   Negative: Shock suspected (e.g., cold/pale/clammy skin, too weak to stand, low BP, rapid pulse)   Negative: Coughing or spitting up blood   Negative: Bluish (or gray) lips or face   Negative: Unconscious or was unconscious   Negative: Sounds like a life-threatening emergency to the triager   Negative: Chest pain not from an injury   Negative: Wound looks infected (e.g., spreading redness, pus)   Negative: SEVERE chest pain    Negative: Difficulty breathing and not severe   Negative: Skin is split open or gaping   Negative: Bleeding won't stop after 10 minutes of direct pressure (using correct technique)   Negative: Sounds like a serious injury to the triager   Negative: Can't take a deep breath but no respiratory distress (e.g., hurts to take a deep breath)   Negative: Shallow puncture wound   Negative: Collarbone is painful and difficulty raising arm   Negative: Patient is confused or is an unreliable provider of information (e.g., dementia, severe intellectual disability, alcohol intoxication)   Negative: No prior tetanus shots (or is not fully vaccinated) and any wound (e.g., cut or scrape)   Negative: HIV positive or severe immunodeficiency (severely weak immune system) and DIRTY cut or scrape    Protocols used: Chest Injury-A-[place]

## 2025-04-15 NOTE — TELEPHONE ENCOUNTER
Spoke with Pt.  Provided results and recommendations with Metoprolol.  Pt verbalized understanding, agreed to current plan of care and denied any further questions.    Dianelys Lakhani LPN     No

## (undated) DEVICE — KIT ENDO TURNOVER/PROCEDURE W/CLEAN A SCOPE LINERS 103888

## (undated) RX ORDER — FENTANYL CITRATE 0.05 MG/ML
INJECTION, SOLUTION INTRAMUSCULAR; INTRAVENOUS
Status: DISPENSED
Start: 2022-03-11

## (undated) RX ORDER — METOPROLOL TARTRATE 1 MG/ML
INJECTION, SOLUTION INTRAVENOUS
Status: DISPENSED
Start: 2018-07-17

## (undated) RX ORDER — REGADENOSON 0.08 MG/ML
INJECTION, SOLUTION INTRAVENOUS
Status: DISPENSED
Start: 2021-08-09

## (undated) RX ORDER — NITROGLYCERIN 0.4 MG/1
TABLET SUBLINGUAL
Status: DISPENSED
Start: 2018-07-17

## (undated) RX ORDER — FENTANYL CITRATE 50 UG/ML
INJECTION, SOLUTION INTRAMUSCULAR; INTRAVENOUS
Status: DISPENSED
Start: 2018-06-29